# Patient Record
Sex: FEMALE | Race: WHITE | NOT HISPANIC OR LATINO | Employment: PART TIME | URBAN - NONMETROPOLITAN AREA
[De-identification: names, ages, dates, MRNs, and addresses within clinical notes are randomized per-mention and may not be internally consistent; named-entity substitution may affect disease eponyms.]

---

## 2017-03-09 ENCOUNTER — OFFICE VISIT (OUTPATIENT)
Dept: INTERNAL MEDICINE | Facility: CLINIC | Age: 62
End: 2017-03-09

## 2017-03-09 VITALS
DIASTOLIC BLOOD PRESSURE: 66 MMHG | TEMPERATURE: 97.8 F | HEART RATE: 62 BPM | HEIGHT: 61 IN | OXYGEN SATURATION: 98 % | WEIGHT: 142.5 LBS | BODY MASS INDEX: 26.91 KG/M2 | SYSTOLIC BLOOD PRESSURE: 140 MMHG

## 2017-03-09 DIAGNOSIS — E03.8 OTHER SPECIFIED HYPOTHYROIDISM: ICD-10-CM

## 2017-03-09 DIAGNOSIS — K21.9 GASTROESOPHAGEAL REFLUX DISEASE WITHOUT ESOPHAGITIS: Primary | ICD-10-CM

## 2017-03-09 DIAGNOSIS — R03.0 ELEVATED BLOOD-PRESSURE READING WITHOUT DIAGNOSIS OF HYPERTENSION: ICD-10-CM

## 2017-03-09 LAB
ALBUMIN SERPL-MCNC: 4.4 G/DL (ref 3.2–4.8)
ALBUMIN/GLOB SERPL: 1.3 G/DL (ref 1.5–2.5)
ALP SERPL-CCNC: 100 U/L (ref 25–100)
ALT SERPL W P-5'-P-CCNC: 17 U/L (ref 7–40)
ANION GAP SERPL CALCULATED.3IONS-SCNC: 4 MMOL/L (ref 3–11)
AST SERPL-CCNC: 22 U/L (ref 0–33)
BASOPHILS # BLD AUTO: 0.03 10*3/MM3 (ref 0–0.2)
BASOPHILS NFR BLD AUTO: 0.4 % (ref 0–1)
BILIRUB SERPL-MCNC: 0.2 MG/DL (ref 0.3–1.2)
BUN BLD-MCNC: 17 MG/DL (ref 9–23)
BUN/CREAT SERPL: 24.3 (ref 7–25)
CALCIUM SPEC-SCNC: 10.3 MG/DL (ref 8.7–10.4)
CHLORIDE SERPL-SCNC: 104 MMOL/L (ref 99–109)
CO2 SERPL-SCNC: 33 MMOL/L (ref 20–31)
CREAT BLD-MCNC: 0.7 MG/DL (ref 0.6–1.3)
DEPRECATED RDW RBC AUTO: 44.8 FL (ref 37–54)
EOSINOPHIL # BLD AUTO: 0.33 10*3/MM3 (ref 0.1–0.3)
EOSINOPHIL NFR BLD AUTO: 4 % (ref 0–3)
ERYTHROCYTE [DISTWIDTH] IN BLOOD BY AUTOMATED COUNT: 12.9 % (ref 11.3–14.5)
GFR SERPL CREATININE-BSD FRML MDRD: 85 ML/MIN/1.73
GLOBULIN UR ELPH-MCNC: 3.3 GM/DL
GLUCOSE BLD-MCNC: 88 MG/DL (ref 70–100)
HCT VFR BLD AUTO: 39.5 % (ref 34.5–44)
HGB BLD-MCNC: 12.8 G/DL (ref 11.5–15.5)
IMM GRANULOCYTES # BLD: 0.02 10*3/MM3 (ref 0–0.03)
IMM GRANULOCYTES NFR BLD: 0.2 % (ref 0–0.6)
LYMPHOCYTES # BLD AUTO: 2.58 10*3/MM3 (ref 0.6–4.8)
LYMPHOCYTES NFR BLD AUTO: 31.3 % (ref 24–44)
MCH RBC QN AUTO: 30.6 PG (ref 27–31)
MCHC RBC AUTO-ENTMCNC: 32.4 G/DL (ref 32–36)
MCV RBC AUTO: 94.5 FL (ref 80–99)
MONOCYTES # BLD AUTO: 0.64 10*3/MM3 (ref 0–1)
MONOCYTES NFR BLD AUTO: 7.8 % (ref 0–12)
NEUTROPHILS # BLD AUTO: 4.65 10*3/MM3 (ref 1.5–8.3)
NEUTROPHILS NFR BLD AUTO: 56.3 % (ref 41–71)
PLATELET # BLD AUTO: 305 10*3/MM3 (ref 150–450)
PMV BLD AUTO: 10.6 FL (ref 6–12)
POTASSIUM BLD-SCNC: 4.8 MMOL/L (ref 3.5–5.5)
PROT SERPL-MCNC: 7.7 G/DL (ref 5.7–8.2)
RBC # BLD AUTO: 4.18 10*6/MM3 (ref 3.89–5.14)
SODIUM BLD-SCNC: 141 MMOL/L (ref 132–146)
T4 FREE SERPL-MCNC: 1.17 NG/DL (ref 0.89–1.76)
TSH SERPL DL<=0.05 MIU/L-ACNC: 4.08 MIU/ML (ref 0.35–5.35)
WBC NRBC COR # BLD: 8.25 10*3/MM3 (ref 3.5–10.8)

## 2017-03-09 PROCEDURE — 99213 OFFICE O/P EST LOW 20 MIN: CPT | Performed by: INTERNAL MEDICINE

## 2017-03-09 PROCEDURE — 36415 COLL VENOUS BLD VENIPUNCTURE: CPT | Performed by: INTERNAL MEDICINE

## 2017-03-09 PROCEDURE — 85025 COMPLETE CBC W/AUTO DIFF WBC: CPT | Performed by: INTERNAL MEDICINE

## 2017-03-09 PROCEDURE — 80053 COMPREHEN METABOLIC PANEL: CPT | Performed by: INTERNAL MEDICINE

## 2017-03-09 PROCEDURE — 84443 ASSAY THYROID STIM HORMONE: CPT | Performed by: INTERNAL MEDICINE

## 2017-03-09 PROCEDURE — 84439 ASSAY OF FREE THYROXINE: CPT | Performed by: INTERNAL MEDICINE

## 2017-03-09 RX ORDER — OMEPRAZOLE 40 MG/1
40 CAPSULE, DELAYED RELEASE ORAL DAILY
Qty: 30 CAPSULE | Refills: 5 | Status: SHIPPED | OUTPATIENT
Start: 2017-03-09 | End: 2017-10-21 | Stop reason: SDUPTHER

## 2017-03-09 NOTE — PROGRESS NOTES
"Chief Complaint   Patient presents with   • Abstract     Pt states she's been having alot of stomach issues. States she feels like she has a hard time swallowing. Pt would also like to get labs done.      Subjective   Carola Resendiz is a 61 y.o. female.     HPI Comments: PMH of hypothyroid, COPD, GERD, H.pylori.  Here with complaints of difficulty swallowing and severe GERD the past few weeks.   She was seen by Dr Gayle last yr and had an EGD and she states she was told she had GERD.  She is taking ranitidine for GERD but feels it does not help.  She does not eat spicy food and tries not to eat after 6 PM.  Occasionally has difficulty swallowing.  No choking episodes.   Her H pylori was negative on scope last yr.   We never received records from her EGD done last year.  It is also time for her annual labs.       The following portions of the patient's history were reviewed and updated as appropriate: allergies, current medications, past family history, past medical history, past social history, past surgical history and problem list.    Review of Systems   HENT: Positive for trouble swallowing.    Gastrointestinal: Positive for abdominal pain and nausea. Negative for blood in stool, constipation, diarrhea and vomiting.        Severe GERD symptoms   Endocrine: Negative for cold intolerance and heat intolerance.   All other systems reviewed and are negative.      Objective   Visit Vitals   • /66   • Pulse 62   • Temp 97.8 °F (36.6 °C)   • Ht 61\" (154.9 cm)   • Wt 142 lb 8 oz (64.6 kg)   • SpO2 98%   • BMI 26.93 kg/m2     Body mass index is 26.93 kg/(m^2).  Physical Exam   Constitutional: She is oriented to person, place, and time. She appears well-developed and well-nourished.   HENT:   Head: Normocephalic and atraumatic.   Mouth/Throat: Oropharynx is clear and moist.   Eyes: Conjunctivae and EOM are normal. Pupils are equal, round, and reactive to light.   Neck: Normal range of motion. Neck supple. No " thyromegaly present.   Cardiovascular: Normal rate, regular rhythm, normal heart sounds and intact distal pulses.    No murmur heard.  Pulmonary/Chest: Effort normal and breath sounds normal. No respiratory distress.   Abdominal: Soft. Bowel sounds are normal.   Mild midepigastric pain to deep palpation   Musculoskeletal: She exhibits no edema.   Lymphadenopathy:     She has no cervical adenopathy.   Neurological: She is alert and oriented to person, place, and time. No cranial nerve deficit.   Psychiatric: She has a normal mood and affect. Judgment normal.   Nursing note and vitals reviewed.      Assessment/Plan   Carola Resendiz is here today and the following problems have been addressed:      Carola was seen today for abstract.    Diagnoses and all orders for this visit:    Gastroesophageal reflux disease without esophagitis  -     CBC & Differential; Future  -     Comprehensive Metabolic Panel; Future    Other specified hypothyroidism  -     TSH; Future  -     T4, Free; Future    Elevated blood-pressure reading without diagnosis of hypertension  -     CBC & Differential; Future  -     Comprehensive Metabolic Panel; Future    Other orders  -     omeprazole (priLOSEC) 40 MG capsule; Take 1 capsule by mouth Daily.    Labs as noted  Stop zantac and start omeprazole- take on empty stomach 30 minutes before food  No other med changes  Will get records from GI  No spicy foods, limit caffeine intake  No NSAIDS    RTC 4 weeks, we will discuss mammogram at that time  Please note that portions of this note were completed with a voice recognition program.  Efforts were made to edit dictation, but occasionally words are mistranscribed.

## 2017-03-27 RX ORDER — LEVOTHYROXINE SODIUM 0.07 MG/1
TABLET ORAL
Qty: 30 TABLET | Refills: 11 | Status: SHIPPED | OUTPATIENT
Start: 2017-03-27 | End: 2018-04-05 | Stop reason: SDUPTHER

## 2017-04-06 ENCOUNTER — OFFICE VISIT (OUTPATIENT)
Dept: INTERNAL MEDICINE | Facility: CLINIC | Age: 62
End: 2017-04-06

## 2017-04-06 VITALS
TEMPERATURE: 98.3 F | OXYGEN SATURATION: 94 % | HEART RATE: 64 BPM | WEIGHT: 144.13 LBS | BODY MASS INDEX: 27.21 KG/M2 | SYSTOLIC BLOOD PRESSURE: 136 MMHG | HEIGHT: 61 IN | DIASTOLIC BLOOD PRESSURE: 68 MMHG

## 2017-04-06 DIAGNOSIS — K21.9 GASTROESOPHAGEAL REFLUX DISEASE WITHOUT ESOPHAGITIS: ICD-10-CM

## 2017-04-06 DIAGNOSIS — Z12.31 SCREENING MAMMOGRAM, ENCOUNTER FOR: ICD-10-CM

## 2017-04-06 DIAGNOSIS — Z11.59 NEED FOR HEPATITIS C SCREENING TEST: Primary | ICD-10-CM

## 2017-04-06 PROCEDURE — 99213 OFFICE O/P EST LOW 20 MIN: CPT | Performed by: INTERNAL MEDICINE

## 2017-04-06 NOTE — PROGRESS NOTES
"Chief Complaint   Patient presents with   • Follow-up     4 weeks for GERD, DM, Hypothyroidism, and to discuss mammogram. No new complaints.      Subjective   Carola Resendiz is a 61 y.o. female.     HPI Comments: Here for followup of GERD sxs.   EGD in Sept last yr revealed gastritis and duodenitis and pt was placed on dexilant.   Last visit here we stopped zantac and placed her on omeprazole due to worsening GERD sxs.   She is feeling better with the omeprazole.  She does not eat spicy food or late at night.  She does tend to eat a significant amount of processed food though.  It is time to schedule mammogram.         The following portions of the patient's history were reviewed and updated as appropriate: allergies, current medications, past family history, past medical history, past social history, past surgical history and problem list.    Review of Systems   Gastrointestinal: Negative for abdominal pain, blood in stool and nausea.        Occasional GERD symptoms       Objective   /68  Pulse 64  Temp 98.3 °F (36.8 °C)  Ht 61\" (154.9 cm)  Wt 144 lb 2 oz (65.4 kg)  SpO2 94%  BMI 27.23 kg/m2  Body mass index is 27.23 kg/(m^2).  Physical Exam   Constitutional: She is oriented to person, place, and time. She appears well-developed and well-nourished.   HENT:   Head: Normocephalic and atraumatic.   Abdominal: Soft. Bowel sounds are normal.   Neurological: She is alert and oriented to person, place, and time.   Psychiatric: She has a normal mood and affect. Judgment normal.   Nursing note and vitals reviewed.      Assessment/Plan   Carola Resendiz is here today and the following problems have been addressed:      Carola was seen today for follow-up.    Diagnoses and all orders for this visit:    Need for hepatitis C screening test  -     Hepatitis C antibody; Future    Screening mammogram, encounter for  -     Mammo Screening Digital Tomosynthesis Bilateral With CAD; Future    Gastroesophageal reflux " disease without esophagitis    Avoid eating spicy foods  Avoid caffeinated beverages  Avoid carbonated beverages  Do not eat or drink within 2-3 hours of going to bed in the evening  Elevate head of bed on 4-6 inch blocks  Eat smaller portions of food throughout the day  Continue omeprazole on empty stomach 1/2 hour before food in morning  Mammogram ordered  Lab as noted    RTC 6 mo    Please note that portions of this note were completed with a voice recognition program.  Efforts were made to edit dictation, but occasionally words are mistranscribed.

## 2017-04-07 LAB — HCV AB S/CO SERPL IA: 0.1 S/CO RATIO (ref 0–0.9)

## 2017-04-26 ENCOUNTER — HOSPITAL ENCOUNTER (OUTPATIENT)
Dept: MAMMOGRAPHY | Facility: HOSPITAL | Age: 62
Discharge: HOME | End: 2017-04-26
Admitting: INTERNAL MEDICINE

## 2017-04-26 DIAGNOSIS — Z12.31 SCREENING MAMMOGRAM, ENCOUNTER FOR: ICD-10-CM

## 2017-04-26 PROCEDURE — G0202 SCR MAMMO BI INCL CAD: HCPCS

## 2017-04-26 PROCEDURE — 77063 BREAST TOMOSYNTHESIS BI: CPT

## 2017-05-09 ENCOUNTER — OFFICE VISIT (OUTPATIENT)
Dept: INTERNAL MEDICINE | Facility: CLINIC | Age: 62
End: 2017-05-09

## 2017-05-09 VITALS
DIASTOLIC BLOOD PRESSURE: 70 MMHG | HEIGHT: 61 IN | BODY MASS INDEX: 27.26 KG/M2 | OXYGEN SATURATION: 98 % | HEART RATE: 68 BPM | WEIGHT: 144.38 LBS | TEMPERATURE: 98.1 F | SYSTOLIC BLOOD PRESSURE: 132 MMHG

## 2017-05-09 DIAGNOSIS — Z91.09 ENVIRONMENTAL ALLERGIES: ICD-10-CM

## 2017-05-09 DIAGNOSIS — R68.89 FLU-LIKE SYMPTOMS: Primary | ICD-10-CM

## 2017-05-09 LAB
EXPIRATION DATE: NORMAL
FLUAV AG NPH QL: NORMAL
FLUBV AG NPH QL: NORMAL
INTERNAL CONTROL: NORMAL
Lab: NORMAL

## 2017-05-09 PROCEDURE — 87804 INFLUENZA ASSAY W/OPTIC: CPT | Performed by: INTERNAL MEDICINE

## 2017-05-09 PROCEDURE — 99213 OFFICE O/P EST LOW 20 MIN: CPT | Performed by: INTERNAL MEDICINE

## 2017-05-09 RX ORDER — METHYLPREDNISOLONE 4 MG/1
TABLET ORAL
Qty: 21 TABLET | Refills: 0 | Status: SHIPPED | OUTPATIENT
Start: 2017-05-09 | End: 2017-06-01

## 2017-06-01 ENCOUNTER — OFFICE VISIT (OUTPATIENT)
Dept: INTERNAL MEDICINE | Facility: CLINIC | Age: 62
End: 2017-06-01

## 2017-06-01 VITALS
HEIGHT: 61 IN | TEMPERATURE: 98.2 F | WEIGHT: 146 LBS | OXYGEN SATURATION: 98 % | SYSTOLIC BLOOD PRESSURE: 128 MMHG | DIASTOLIC BLOOD PRESSURE: 80 MMHG | BODY MASS INDEX: 27.56 KG/M2 | HEART RATE: 82 BPM

## 2017-06-01 DIAGNOSIS — B37.2 YEAST DERMATITIS: Primary | ICD-10-CM

## 2017-06-01 PROCEDURE — 99213 OFFICE O/P EST LOW 20 MIN: CPT | Performed by: PHYSICIAN ASSISTANT

## 2017-06-01 RX ORDER — NYSTATIN AND TRIAMCINOLONE ACETONIDE 100000; 1 [USP'U]/G; MG/G
OINTMENT TOPICAL 2 TIMES DAILY
Qty: 30 G | Refills: 0 | Status: SHIPPED | OUTPATIENT
Start: 2017-06-01 | End: 2017-06-08

## 2017-06-01 NOTE — PROGRESS NOTES
Carola Resendiz is a 61 y.o. female.     Subjective   History of Present Illness   Here today with concern of sudden onset of rash ib bilateral axilla 4 days ago which has been spreading since then. Reports that the rash burns and itches. Has been using Hydrocortisone cream which helps the itching but rash continues to spread. Has also been coughing and had rhinorrhea with sore throat for the last 2 weeks which was treated with a Medrol dose pack by Dr. Vermeesch.  Symptoms improved but are still bothersome.       The following portions of the patient's history were reviewed and updated as appropriate: allergies, current medications, past family history, past medical history, past social history, past surgical history and problem list.    Review of Systems    Constitutional: Negative for appetite change, chills, fatigue, fever and unexpected weight change.   HENT:  rhinorrhea, sore throat, Negative for congestion, ear pain, hearing loss, nosebleeds, postnasal drip, tinnitus and trouble swallowing.    Eyes: Negative for photophobia, discharge and visual disturbance.   Respiratory: cough. Negative for chest tightness, shortness of breath and wheezing.    Cardiovascular: Negative for chest pain, palpitations and leg swelling.   Gastrointestinal: Negative for abdominal distention, abdominal pain, blood in stool, constipation, diarrhea, nausea and vomiting.   Endocrine: Negative for cold intolerance, heat intolerance, polydipsia, polyphagia and polyuria.   Musculoskeletal: Negative for arthralgias, back pain, joint swelling, myalgias, neck pain and neck stiffness.   Skin: rash, itching, burning. Negative for color change, pallor and wound.   Allergic/Immunologic: Negative for environmental allergies, food allergies and immunocompromised state.   Neurological: Negative for dizziness, tremors, seizures, weakness, numbness and headaches.   Hematological: Negative for adenopathy. Does not bruise/bleed easily.  "  Psychiatric/Behavioral: Negative for sleep disturbances, agitation, behavioral problems, confusion, hallucinations, self-injury and suicidal ideas. The patient is not nervous/anxious.      Objective    Physical Exam  Constitutional: Oriented to person, place, and time. Appears well-developed and well-nourished.   HENT: mild OP erythema  Head: Normocephalic and atraumatic.   Eyes: EOM are normal. Pupils are equal, round, and reactive to light.   Neck: Normal range of motion. Neck supple.   Cardiovascular: Normal rate, regular rhythm and normal heart sounds.    Pulmonary/Chest: Effort normal and breath sounds normal. No respiratory distress.  Has no wheezes or rales. Exhibits no chest wall tenderness.   Abdominal: Soft. Bowel sounds are normal. Exhibits no distension and no mass. There is no tenderness.   Musculoskeletal: Normal range of motion. Exhibits no tenderness.   Neurological: Alert and oriented to person, place, and time.   Skin: erythematous maculopapular rash without vesicular appearance. Few satellite lesions.  Skin is warm and dry.   Psychiatric: Has a normal mood and affect. Behavior is normal. Judgment and thought content normal.       /80  Pulse 82  Temp 98.2 °F (36.8 °C)  Ht 61\" (154.9 cm)  Wt 146 lb (66.2 kg)  SpO2 98%  BMI 27.59 kg/m2    Nursing note and vitals reviewed.        Assessment/Plan   Carola was seen today for rash.    Diagnoses and all orders for this visit:    Yeast dermatitis  -     nystatin-triamcinolone (MYCOLOG) 194047-7.1 UNIT/GM-% ointment; Apply  topically 2 (Two) Times a Day for 7 days.    Call if rash not improving within 3 days.            "

## 2017-10-06 ENCOUNTER — OFFICE VISIT (OUTPATIENT)
Dept: INTERNAL MEDICINE | Facility: CLINIC | Age: 62
End: 2017-10-06

## 2017-10-06 VITALS
SYSTOLIC BLOOD PRESSURE: 130 MMHG | WEIGHT: 144.25 LBS | HEIGHT: 61 IN | TEMPERATURE: 98.4 F | OXYGEN SATURATION: 97 % | BODY MASS INDEX: 27.23 KG/M2 | HEART RATE: 69 BPM | DIASTOLIC BLOOD PRESSURE: 72 MMHG

## 2017-10-06 DIAGNOSIS — J43.9 PULMONARY EMPHYSEMA, UNSPECIFIED EMPHYSEMA TYPE (HCC): ICD-10-CM

## 2017-10-06 DIAGNOSIS — K21.9 GASTROESOPHAGEAL REFLUX DISEASE WITHOUT ESOPHAGITIS: ICD-10-CM

## 2017-10-06 DIAGNOSIS — E03.8 OTHER SPECIFIED HYPOTHYROIDISM: Primary | ICD-10-CM

## 2017-10-06 PROCEDURE — 99213 OFFICE O/P EST LOW 20 MIN: CPT | Performed by: INTERNAL MEDICINE

## 2017-10-06 RX ORDER — AMOXICILLIN AND CLAVULANATE POTASSIUM 875; 125 MG/1; MG/1
TABLET, FILM COATED ORAL
Refills: 0 | COMMUNITY
Start: 2017-09-26 | End: 2018-03-22

## 2017-10-06 NOTE — PROGRESS NOTES
"Chief Complaint   Patient presents with   • Follow-up     3 months for DM, GERD, and Hypothyroidism. No new complaints.      Subjective   Carola Resendiz is a 61 y.o. female.     HPI Comments: Here for follow up of hypothyroid, COPD, GERD.  Her GERD is usually well controlled.  She tries to avoid spicy foods.  Has a fullness at top of stomach.   She takes her thyroid med on empty stomach.  She is cold all the time.  She had a sinus infection last week, went to [facility] and was given augmentin, she is feeling better now.   She saw Dr Bates and he changed her tudorza to spiriva and it does not work as well.  She is not using spiriva.   Her mammogram is UTD.   She declines flu shot.         The following portions of the patient's history were reviewed and updated as appropriate: allergies, current medications, past family history, past medical history, past social history, past surgical history and problem list.    Review of Systems   Respiratory: Positive for shortness of breath. Negative for wheezing.    Cardiovascular: Negative for chest pain, palpitations and leg swelling.   Gastrointestinal: Positive for abdominal distention. Negative for abdominal pain, constipation, diarrhea, nausea and vomiting.   Endocrine: Negative for cold intolerance and heat intolerance.   All other systems reviewed and are negative.      Objective   /72  Pulse 69  Temp 98.4 °F (36.9 °C)  Ht 61\" (154.9 cm)  Wt 144 lb 4 oz (65.4 kg)  SpO2 97%  BMI 27.26 kg/m2  Body mass index is 27.26 kg/(m^2).  Physical Exam   Constitutional: She is oriented to person, place, and time. She appears well-developed and well-nourished.   HENT:   Head: Normocephalic and atraumatic.   Mouth/Throat: Oropharynx is clear and moist.   Eyes: Conjunctivae and EOM are normal. Pupils are equal, round, and reactive to light.   Neck: Normal range of motion. Neck supple. No thyromegaly present.   Cardiovascular: Normal rate, regular rhythm, normal heart " sounds and intact distal pulses.    No murmur heard.  Pulmonary/Chest: Effort normal. No respiratory distress.   Diminished breath sounds throughout all lung fields   Abdominal: Soft. Bowel sounds are normal.   Musculoskeletal: She exhibits no edema.   Lymphadenopathy:     She has no cervical adenopathy.   Neurological: She is alert and oriented to person, place, and time. No cranial nerve deficit.   Psychiatric: She has a normal mood and affect. Judgment normal.   Nursing note and vitals reviewed.      Assessment/Plan   Carola Resendiz is here today and the following problems have been addressed:      Carola was seen today for follow-up.    Diagnoses and all orders for this visit:    Other specified hypothyroidism    Gastroesophageal reflux disease without esophagitis    Pulmonary emphysema, unspecified emphysema type    continue all current meds  She declines flu vaccine  All other HCM is UTD  She will discuss change in inhalers with Dr Paulo SIMPSON 5 mo, labs then    Please note that portions of this note were completed with a voice recognition program.  Efforts were made to edit dictation, but occasionally words are mistranscribed.

## 2017-10-23 RX ORDER — OMEPRAZOLE 40 MG/1
CAPSULE, DELAYED RELEASE ORAL
Qty: 30 CAPSULE | Refills: 5 | Status: SHIPPED | OUTPATIENT
Start: 2017-10-23 | End: 2018-04-30 | Stop reason: SDUPTHER

## 2017-12-23 ENCOUNTER — HOSPITAL ENCOUNTER (EMERGENCY)
Facility: HOSPITAL | Age: 62
Discharge: HOME | End: 2017-12-23
Attending: EMERGENCY MEDICINE | Admitting: EMERGENCY MEDICINE

## 2017-12-23 ENCOUNTER — APPOINTMENT (OUTPATIENT)
Dept: GENERAL RADIOLOGY | Facility: HOSPITAL | Age: 62
End: 2017-12-23

## 2017-12-23 VITALS
BODY MASS INDEX: 26.43 KG/M2 | TEMPERATURE: 98.1 F | HEART RATE: 78 BPM | WEIGHT: 140 LBS | DIASTOLIC BLOOD PRESSURE: 78 MMHG | RESPIRATION RATE: 18 BRPM | OXYGEN SATURATION: 94 % | SYSTOLIC BLOOD PRESSURE: 143 MMHG | HEIGHT: 61 IN

## 2017-12-23 DIAGNOSIS — J10.1 INFLUENZA A: Primary | ICD-10-CM

## 2017-12-23 LAB
FLUAV AG NPH QL: POSITIVE
FLUBV AG NPH QL IA: NEGATIVE

## 2017-12-23 PROCEDURE — 99284 EMERGENCY DEPT VISIT MOD MDM: CPT

## 2017-12-23 PROCEDURE — 71020 HC CHEST PA AND LATERAL: CPT

## 2017-12-23 PROCEDURE — 94640 AIRWAY INHALATION TREATMENT: CPT

## 2017-12-23 PROCEDURE — 94799 UNLISTED PULMONARY SVC/PX: CPT

## 2017-12-23 PROCEDURE — 87804 INFLUENZA ASSAY W/OPTIC: CPT | Performed by: EMERGENCY MEDICINE

## 2017-12-23 PROCEDURE — 63710000001 PREDNISONE PER 5 MG: Performed by: PHYSICIAN ASSISTANT

## 2017-12-23 RX ORDER — PREDNISONE 20 MG/1
20 TABLET ORAL 3 TIMES DAILY
Qty: 15 TABLET | Refills: 0 | Status: SHIPPED | OUTPATIENT
Start: 2017-12-23 | End: 2018-03-22

## 2017-12-23 RX ORDER — IPRATROPIUM BROMIDE AND ALBUTEROL SULFATE 2.5; .5 MG/3ML; MG/3ML
3 SOLUTION RESPIRATORY (INHALATION) ONCE
Status: COMPLETED | OUTPATIENT
Start: 2017-12-23 | End: 2017-12-23

## 2017-12-23 RX ADMIN — PREDNISONE 60 MG: 50 TABLET ORAL at 18:48

## 2017-12-23 RX ADMIN — IPRATROPIUM BROMIDE AND ALBUTEROL SULFATE 3 ML: .5; 3 SOLUTION RESPIRATORY (INHALATION) at 17:31

## 2017-12-26 ENCOUNTER — TELEPHONE (OUTPATIENT)
Dept: INTERNAL MEDICINE | Facility: CLINIC | Age: 62
End: 2017-12-26

## 2017-12-26 NOTE — TELEPHONE ENCOUNTER
I recommend plain Mucinex expectorant.  I also recommend she use her albuterol inhaler every 3-4 hours.

## 2017-12-26 NOTE — TELEPHONE ENCOUNTER
PATIENT WENT TO THE [facility] AND THEY TESTED HER FOR THE FLU WHICH WAS NEGATIVE. THEY GAVE HER SOME AMOXACILLIN BUT AFTER TAKING IT DIDN'T FEEL ANY BETTER. SHE WENT TO THE ER Saturday AND TESTED POSITIVE FOR THE FLU. THEY GAVE HER STEROIDS. SHE SAYS SHE FEELS A LITTLE BETTER BUT SHE CAN'T GET ANYTHING ELSE OUT AND IT'S HANGING IN HER CHEST. SHE SAID SHE IS TAKING THE COUGH SYRUP, STEROIDS, AND INHALER. SHE IS WANTING TO KNOW WHAT SHE SHOULD DO AND REQUESTS CALL BACK.

## 2018-03-22 ENCOUNTER — OFFICE VISIT (OUTPATIENT)
Dept: INTERNAL MEDICINE | Facility: CLINIC | Age: 63
End: 2018-03-22

## 2018-03-22 ENCOUNTER — TELEPHONE (OUTPATIENT)
Dept: INTERNAL MEDICINE | Facility: CLINIC | Age: 63
End: 2018-03-22

## 2018-03-22 VITALS
OXYGEN SATURATION: 98 % | TEMPERATURE: 98 F | SYSTOLIC BLOOD PRESSURE: 140 MMHG | WEIGHT: 146.5 LBS | BODY MASS INDEX: 27.66 KG/M2 | DIASTOLIC BLOOD PRESSURE: 85 MMHG | HEIGHT: 61 IN | HEART RATE: 83 BPM

## 2018-03-22 DIAGNOSIS — J43.9 PULMONARY EMPHYSEMA, UNSPECIFIED EMPHYSEMA TYPE (HCC): Primary | ICD-10-CM

## 2018-03-22 DIAGNOSIS — K21.9 GASTROESOPHAGEAL REFLUX DISEASE WITHOUT ESOPHAGITIS: ICD-10-CM

## 2018-03-22 DIAGNOSIS — E03.8 OTHER SPECIFIED HYPOTHYROIDISM: ICD-10-CM

## 2018-03-22 DIAGNOSIS — R03.0 ELEVATED BLOOD-PRESSURE READING WITHOUT DIAGNOSIS OF HYPERTENSION: ICD-10-CM

## 2018-03-22 DIAGNOSIS — R10.32 LLQ PAIN: ICD-10-CM

## 2018-03-22 LAB
BILIRUB BLD-MCNC: NEGATIVE MG/DL
CLARITY, POC: CLEAR
COLOR UR: YELLOW
GLUCOSE UR STRIP-MCNC: NEGATIVE MG/DL
KETONES UR QL: NEGATIVE
LEUKOCYTE EST, POC: ABNORMAL
NITRITE UR-MCNC: NEGATIVE MG/ML
PH UR: 5 [PH] (ref 5–8)
POC CREATININE URINE: 200
POC MICROALBUMIN URINE: 30
PROT UR STRIP-MCNC: NEGATIVE MG/DL
RBC # UR STRIP: NEGATIVE /UL
SP GR UR: 1.02 (ref 1–1.03)
UROBILINOGEN UR QL: ABNORMAL

## 2018-03-22 PROCEDURE — 82044 UR ALBUMIN SEMIQUANTITATIVE: CPT | Performed by: INTERNAL MEDICINE

## 2018-03-22 PROCEDURE — 81003 URINALYSIS AUTO W/O SCOPE: CPT | Performed by: INTERNAL MEDICINE

## 2018-03-22 PROCEDURE — 99214 OFFICE O/P EST MOD 30 MIN: CPT | Performed by: INTERNAL MEDICINE

## 2018-03-22 RX ORDER — METRONIDAZOLE 500 MG/1
500 TABLET ORAL 3 TIMES DAILY
Qty: 30 TABLET | Refills: 0 | Status: SHIPPED | OUTPATIENT
Start: 2018-03-22 | End: 2018-04-03

## 2018-03-22 RX ORDER — TIOTROPIUM BROMIDE INHALATION SPRAY 3.12 UG/1
SPRAY, METERED RESPIRATORY (INHALATION)
Refills: 1 | COMMUNITY
Start: 2018-01-22 | End: 2020-06-04 | Stop reason: SDUPTHER

## 2018-03-22 RX ORDER — CIPROFLOXACIN 500 MG/1
500 TABLET, FILM COATED ORAL EVERY 12 HOURS SCHEDULED
Qty: 20 TABLET | Refills: 0 | Status: SHIPPED | OUTPATIENT
Start: 2018-03-22 | End: 2018-04-03

## 2018-03-22 NOTE — TELEPHONE ENCOUNTER
PATIENT NEEDS A 2 WF/U WITH Dr. VERMEESCH. THERE WAS NOT A SPOT TO PUT HER IN PLEASE LOOK AT THE SCHEDULE AND TELL HER WHEN TO COME BACK.     THANKS

## 2018-03-22 NOTE — PROGRESS NOTES
"Chief Complaint   Patient presents with   • Follow-up     3 month, COPD, GERD, DM, Hypothyroidism     Subjective   Carola Resendiz is a 62 y.o. female.     Here for follow up of hypothyroid, COPD, GERD.  Her BP is elevated again today.  She has not been checking it at home.    She has been having LLQ pain lately.  The pain radiates up under left ribs to mid stomach.  Stomach gets very bloated.  It becomes a pressure and goes to chest.  She eats TUMS and the pressure resolves.  She had colonoscopy in 2016 per DR Manuel and she had diverticulosis.  She has been having this pain about every other day.  She does take her omeprazole on empty stomach in AM.   She does not get much GERD, but it occurs about twice a month.    She takes her thyroid med in AM on empty stomach.   Her COPD is stable on spirvia and albuterol.  She sees DR Bates for this.  He will do low dose CT next visit.   Her mammogram is due next mo, she will schedule this.            The following portions of the patient's history were reviewed and updated as appropriate: allergies, current medications, past family history, past medical history, past social history, past surgical history and problem list.    Review of Systems   Constitutional: Negative for unexpected weight change.   Respiratory: Negative for shortness of breath and wheezing.    Cardiovascular: Negative for chest pain, palpitations and leg swelling.   Gastrointestinal: Positive for abdominal distention and abdominal pain. Negative for constipation and diarrhea.   Endocrine: Negative for cold intolerance and heat intolerance.   All other systems reviewed and are negative.      Objective   /85   Pulse 83   Temp 98 °F (36.7 °C)   Ht 154.9 cm (60.98\")   Wt 66.5 kg (146 lb 8 oz)   SpO2 98%   BMI 27.70 kg/m²   Body mass index is 27.7 kg/m².  Physical Exam   Constitutional: She is oriented to person, place, and time. She appears well-developed and well-nourished.   HENT:   Head: " Normocephalic and atraumatic.   Mouth/Throat: Oropharynx is clear and moist.   Eyes: Conjunctivae and EOM are normal. Pupils are equal, round, and reactive to light.   Neck: Normal range of motion. Neck supple. No thyromegaly present.   Cardiovascular: Normal rate, regular rhythm, normal heart sounds and intact distal pulses.    No murmur heard.  Pulmonary/Chest: Effort normal. No respiratory distress.   Slightly decreased breath sounds throughout   Abdominal: Soft. Bowel sounds are normal. She exhibits distension. There is tenderness.   Abdomen is distended and tender in midepigastric area, no palpable masses, no rebound or guarding   Musculoskeletal: She exhibits no edema.   Lymphadenopathy:     She has no cervical adenopathy.   Neurological: She is alert and oriented to person, place, and time. No cranial nerve deficit.   Psychiatric: She has a normal mood and affect. Judgment normal.   Nursing note and vitals reviewed.      Assessment/Plan   Carola Resendiz is here today and the following problems have been addressed:      Carola was seen today for follow-up.    Diagnoses and all orders for this visit:    Pulmonary emphysema, unspecified emphysema type    Gastroesophageal reflux disease without esophagitis  -     Comprehensive Metabolic Panel    Other specified hypothyroidism  -     T4, Free  -     TSH    Elevated blood-pressure reading without diagnosis of hypertension  -     Lipid Panel    LLQ pain  -     CBC & Differential  -     Comprehensive Metabolic Panel  -     Celiac Comprehensive Panel  -     POCT urinalysis dipstick, automated  -     POCT microalbumin    Other orders  -     ciprofloxacin (CIPRO) 500 MG tablet; Take 1 tablet by mouth Every 12 (Twelve) Hours.  -     metroNIDAZOLE (FLAGYL) 500 MG tablet; Take 1 tablet by mouth 3 (Three) Times a Day.    labs as noted  Check BP on occasion due to elevated readings  Given cipro and flagyl for possible diverticulitis based on history and exam  UA with 25 LE,  UC sent  Will check for celiac disease also  If abdominal sxs are not improved at follow up visit, will do CT of abd/pelvis  COPD is stable  She will schedule her mammogram    RTC 2 weeks for follow up of abd pain    Please note that portions of this note were completed with a voice recognition program.  Efforts were made to edit dictation, but occasionally words are mistranscribed.

## 2018-03-23 NOTE — TELEPHONE ENCOUNTER
We do not have any openings in 2 weeks for a f/u visit. I will keep an eye on schedule for openings.

## 2018-03-24 LAB
BACTERIA UR CULT: NORMAL
BACTERIA UR CULT: NORMAL

## 2018-03-26 ENCOUNTER — TELEPHONE (OUTPATIENT)
Dept: INTERNAL MEDICINE | Facility: CLINIC | Age: 63
End: 2018-03-26

## 2018-03-26 LAB
ALBUMIN SERPL-MCNC: 4.1 G/DL (ref 3.5–5)
ALBUMIN/GLOB SERPL: 1.2 G/DL (ref 1–2)
ALP SERPL-CCNC: 85 U/L (ref 38–126)
ALT SERPL-CCNC: 28 U/L (ref 13–69)
AST SERPL-CCNC: 21 U/L (ref 15–46)
BASOPHILS # BLD AUTO: 0.03 10*3/MM3 (ref 0–0.2)
BASOPHILS NFR BLD AUTO: 0.4 % (ref 0–2.5)
BILIRUB SERPL-MCNC: 0.5 MG/DL (ref 0.2–1.3)
BUN SERPL-MCNC: 19 MG/DL (ref 7–20)
BUN/CREAT SERPL: 31.7 (ref 7.1–23.5)
CALCIUM SERPL-MCNC: 9.9 MG/DL (ref 8.4–10.2)
CHLORIDE SERPL-SCNC: 103 MMOL/L (ref 98–107)
CHOLEST SERPL-MCNC: 198 MG/DL (ref 0–199)
CO2 SERPL-SCNC: 30 MMOL/L (ref 26–30)
CREAT SERPL-MCNC: 0.6 MG/DL (ref 0.6–1.3)
ENDOMYSIUM IGA SER QL: NEGATIVE
EOSINOPHIL # BLD AUTO: 0.28 10*3/MM3 (ref 0–0.7)
EOSINOPHIL NFR BLD AUTO: 4.2 % (ref 0–7)
ERYTHROCYTE [DISTWIDTH] IN BLOOD BY AUTOMATED COUNT: 12.3 % (ref 11.5–14.5)
GFR SERPLBLD CREATININE-BSD FMLA CKD-EPI: 101 ML/MIN/1.73
GFR SERPLBLD CREATININE-BSD FMLA CKD-EPI: 122 ML/MIN/1.73
GLIADIN PEPTIDE IGA SER-ACNC: 4 UNITS (ref 0–19)
GLIADIN PEPTIDE IGG SER-ACNC: 2 UNITS (ref 0–19)
GLOBULIN SER CALC-MCNC: 3.4 GM/DL
GLUCOSE SERPL-MCNC: 89 MG/DL (ref 74–98)
HCT VFR BLD AUTO: 38.9 % (ref 37–47)
HDLC SERPL-MCNC: 80 MG/DL (ref 40–60)
HGB BLD-MCNC: 12.7 G/DL (ref 12–16)
IGA SERPL-MCNC: 331 MG/DL (ref 87–352)
IMM GRANULOCYTES # BLD: 0 10*3/MM3 (ref 0–0.06)
IMM GRANULOCYTES NFR BLD: 0 % (ref 0–0.6)
LDLC SERPL CALC-MCNC: 102 MG/DL (ref 0–99)
LYMPHOCYTES # BLD AUTO: 1.78 10*3/MM3 (ref 0.6–3.4)
LYMPHOCYTES NFR BLD AUTO: 26.4 % (ref 10–50)
MCH RBC QN AUTO: 31.1 PG (ref 27–31)
MCHC RBC AUTO-ENTMCNC: 32.6 G/DL (ref 30–37)
MCV RBC AUTO: 95.3 FL (ref 81–99)
MONOCYTES # BLD AUTO: 0.47 10*3/MM3 (ref 0–0.9)
MONOCYTES NFR BLD AUTO: 7 % (ref 0–12)
NEUTROPHILS # BLD AUTO: 4.18 10*3/MM3 (ref 2–6.9)
NEUTROPHILS NFR BLD AUTO: 62 % (ref 37–80)
NRBC BLD AUTO-RTO: 0 /100 WBC (ref 0–0)
PLATELET # BLD AUTO: 278 10*3/MM3 (ref 130–400)
POTASSIUM SERPL-SCNC: 4.8 MMOL/L (ref 3.5–5.1)
PROT SERPL-MCNC: 7.5 G/DL (ref 6.3–8.2)
RBC # BLD AUTO: 4.08 10*6/MM3 (ref 4.2–5.4)
SODIUM SERPL-SCNC: 146 MMOL/L (ref 137–145)
T4 FREE SERPL-MCNC: 1.69 NG/DL (ref 0.78–2.19)
TRIGL SERPL-MCNC: 81 MG/DL
TSH SERPL DL<=0.005 MIU/L-ACNC: 1.99 MIU/ML (ref 0.47–4.68)
TTG IGA SER-ACNC: <2 U/ML (ref 0–3)
TTG IGG SER-ACNC: <2 U/ML (ref 0–5)
VLDLC SERPL CALC-MCNC: 16.2 MG/DL
WBC # BLD AUTO: 6.74 10*3/MM3 (ref 4.8–10.8)

## 2018-03-26 NOTE — PROGRESS NOTES
Please tell patient all labs are in normal or acceptable range at this time.  Celiac panel is negative.  Please tell her to continue antibiotics given and we will follow-up with her as scheduled.

## 2018-03-26 NOTE — TELEPHONE ENCOUNTER
----- Message from Marilyn K Vermeesch, MD sent at 3/26/2018  7:27 AM EDT -----  Please tell patient all labs are in normal or acceptable range at this time.  Celiac panel is negative.  Please tell her to continue antibiotics given and we will follow-up with her as scheduled.

## 2018-04-03 ENCOUNTER — OFFICE VISIT (OUTPATIENT)
Dept: INTERNAL MEDICINE | Facility: CLINIC | Age: 63
End: 2018-04-03

## 2018-04-03 VITALS
TEMPERATURE: 98 F | SYSTOLIC BLOOD PRESSURE: 144 MMHG | DIASTOLIC BLOOD PRESSURE: 68 MMHG | OXYGEN SATURATION: 96 % | HEART RATE: 69 BPM

## 2018-04-03 DIAGNOSIS — K21.9 GASTROESOPHAGEAL REFLUX DISEASE WITHOUT ESOPHAGITIS: Primary | ICD-10-CM

## 2018-04-03 DIAGNOSIS — R10.32 LLQ PAIN: ICD-10-CM

## 2018-04-03 PROCEDURE — 99213 OFFICE O/P EST LOW 20 MIN: CPT | Performed by: INTERNAL MEDICINE

## 2018-04-03 NOTE — PROGRESS NOTES
Chief Complaint   Patient presents with   • Follow-up     2 weeks for abdominal pain. Pt states she's about the same as last visit. Pt states she didn't complete the Cipro and Flagyl     Subjective   Carola Resendiz is a 62 y.o. female.     Here for follow up of LLQ pain with bloating.   She was given cipro and flagyl for presumed diverticulitis, as she has known diverticulosis.  She only took 3 doses of each ABX-said it made her feel too sick.    She is having pain in left lower quadrant on and off almost daily.  Her stools are normal most of the time.  No blood or mucous.   She is having pain later in the day after lunch.  She feels good in the morning.  She starts to get bloating then gas begins to push up on diaphram and she feels SOA.    She does not drink carbonated beverages.   EGD in 2016 did not reveal HH, but she did have esophagitis, duodenitis.  Colonoscopy in 2016 per Dr. Manuel revealed hemorrhoids, sessile polyps and diverticulosis.  There is family history of colon cancer.  Her weight is stable.             The following portions of the patient's history were reviewed and updated as appropriate: allergies, current medications, past family history, past medical history, past social history, past surgical history and problem list.    Review of Systems   Constitutional: Negative for activity change, appetite change and unexpected weight change.   Respiratory: Positive for shortness of breath.    Gastrointestinal: Positive for abdominal distention, abdominal pain and nausea.       Objective   /68   Pulse 69   Temp 98 °F (36.7 °C)   SpO2 96%   There is no height or weight on file to calculate BMI.  Physical Exam   Constitutional: She is oriented to person, place, and time. She appears well-developed and well-nourished.   HENT:   Head: Normocephalic and atraumatic.   Eyes: EOM are normal. Pupils are equal, round, and reactive to light.   Pulmonary/Chest: Effort normal.   Abdominal: Soft. Bowel  sounds are normal. She exhibits distension. There is tenderness.   Minimal tenderness in left lower quadrant   Neurological: She is alert and oriented to person, place, and time.   Psychiatric: She has a normal mood and affect. Her behavior is normal. Judgment and thought content normal.   Nursing note and vitals reviewed.      Assessment/Plan   Carola Resendiz is here today and the following problems have been addressed:      Carola was seen today for follow-up.    Diagnoses and all orders for this visit:    Gastroesophageal reflux disease without esophagitis  -     Ambulatory Referral to Gastroenterology    LLQ pain  -     CT Abdomen Pelvis With & Without Contrast    CT abdomen with and without contrast for further evaluation of left lower quadrant pain, recent creatinine was normal  Refer to Dr Manuel for evaluation of left lower quadrant pain and possible repeat colonoscopy  Continue omeprazole    Return to clinic in 3 months or when necessary        Please note that portions of this note were completed with a voice recognition program.  Efforts were made to edit dictation, but occasionally words are mistranscribed.

## 2018-04-05 RX ORDER — LEVOTHYROXINE SODIUM 0.07 MG/1
TABLET ORAL
Qty: 30 TABLET | Refills: 2 | Status: SHIPPED | OUTPATIENT
Start: 2018-04-05 | End: 2018-07-05 | Stop reason: SDUPTHER

## 2018-04-06 ENCOUNTER — HOSPITAL ENCOUNTER (OUTPATIENT)
Dept: CT IMAGING | Facility: HOSPITAL | Age: 63
Discharge: HOME | End: 2018-04-06
Admitting: INTERNAL MEDICINE

## 2018-04-06 PROCEDURE — 25010000002 IOPAMIDOL 61 % SOLUTION: Performed by: INTERNAL MEDICINE

## 2018-04-06 PROCEDURE — 74178 CT ABD&PLV WO CNTR FLWD CNTR: CPT

## 2018-04-06 RX ADMIN — IOPAMIDOL 100 ML: 612 INJECTION, SOLUTION INTRAVENOUS at 10:15

## 2018-04-06 NOTE — PROGRESS NOTES
Please tell patient CT scan of abdomen is normal except for diverticulosis with no evidence of infection at this time.

## 2018-04-09 ENCOUNTER — TELEPHONE (OUTPATIENT)
Dept: INTERNAL MEDICINE | Facility: CLINIC | Age: 63
End: 2018-04-09

## 2018-04-09 NOTE — TELEPHONE ENCOUNTER
----- Message from Marilyn K Vermeesch, MD sent at 4/6/2018 10:36 AM EDT -----  Please tell patient CT scan of abdomen is normal except for diverticulosis with no evidence of infection at this time.

## 2018-04-30 ENCOUNTER — OFFICE VISIT (OUTPATIENT)
Dept: INTERNAL MEDICINE | Facility: CLINIC | Age: 63
End: 2018-04-30

## 2018-04-30 VITALS
DIASTOLIC BLOOD PRESSURE: 80 MMHG | SYSTOLIC BLOOD PRESSURE: 142 MMHG | TEMPERATURE: 97.8 F | HEART RATE: 76 BPM | OXYGEN SATURATION: 97 %

## 2018-04-30 DIAGNOSIS — R68.89 FLU-LIKE SYMPTOMS: ICD-10-CM

## 2018-04-30 DIAGNOSIS — H61.23 BILATERAL IMPACTED CERUMEN: ICD-10-CM

## 2018-04-30 DIAGNOSIS — R03.0 ELEVATED BLOOD-PRESSURE READING WITHOUT DIAGNOSIS OF HYPERTENSION: ICD-10-CM

## 2018-04-30 DIAGNOSIS — J01.40 SUBACUTE PANSINUSITIS: Primary | ICD-10-CM

## 2018-04-30 PROBLEM — R10.32 LLQ PAIN: Status: RESOLVED | Noted: 2018-03-22 | Resolved: 2018-04-30

## 2018-04-30 PROCEDURE — 87804 INFLUENZA ASSAY W/OPTIC: CPT | Performed by: INTERNAL MEDICINE

## 2018-04-30 PROCEDURE — 99213 OFFICE O/P EST LOW 20 MIN: CPT | Performed by: INTERNAL MEDICINE

## 2018-04-30 PROCEDURE — 69209 REMOVE IMPACTED EAR WAX UNI: CPT | Performed by: INTERNAL MEDICINE

## 2018-04-30 RX ORDER — CEFUROXIME AXETIL 500 MG/1
500 TABLET ORAL 2 TIMES DAILY
Qty: 20 TABLET | Refills: 0 | Status: SHIPPED | OUTPATIENT
Start: 2018-04-30 | End: 2018-06-27

## 2018-04-30 RX ORDER — PREDNISONE 20 MG/1
20 TABLET ORAL DAILY
Qty: 5 TABLET | Refills: 0 | Status: SHIPPED | OUTPATIENT
Start: 2018-04-30 | End: 2018-06-27

## 2018-04-30 RX ORDER — OMEPRAZOLE 40 MG/1
40 CAPSULE, DELAYED RELEASE ORAL DAILY
Qty: 30 CAPSULE | Refills: 12 | Status: SHIPPED | OUTPATIENT
Start: 2018-04-30 | End: 2019-05-13 | Stop reason: SDUPTHER

## 2018-04-30 NOTE — PROGRESS NOTES
Chief Complaint   Patient presents with   • Cough     head/chest congestion, body aches, and chills X Friday.     Subjective   Carola Resendiz is a 62 y.o. female.     Her  has the same symptoms.  She also has been having some sneezing.  There are a lot of others at work with similar sxs.     Of note blood pressure is elevated again.  Patient states her home readings run approximately 120/76, she believes she has white coat syndrome as it is always slightly elevated at doctor's office.  Nice and he chest pain, palpitations or edema.      Cough   This is a new problem. The current episode started in the past 7 days. The problem has been gradually worsening. The problem occurs hourly. The cough is productive of sputum. Associated symptoms include chills, ear congestion, headaches, myalgias, nasal congestion, postnasal drip, rhinorrhea, a sore throat, shortness of breath and wheezing. Pertinent negatives include no ear pain, fever, hemoptysis or sweats. The symptoms are aggravated by lying down. She has tried a beta-agonist inhaler and OTC cough suppressant (mucinex and delsym) for the symptoms. Her past medical history is significant for COPD.        The following portions of the patient's history were reviewed and updated as appropriate: allergies, current medications, past family history, past medical history, past social history, past surgical history and problem list.    Review of Systems   Constitutional: Positive for chills. Negative for fever.   HENT: Positive for postnasal drip, rhinorrhea and sore throat. Negative for ear pain.    Respiratory: Positive for cough, shortness of breath and wheezing. Negative for hemoptysis.    Musculoskeletal: Positive for myalgias.   Neurological: Positive for headaches.       Objective   /80   Pulse 76   Temp 97.8 °F (36.6 °C)   SpO2 97%   There is no height or weight on file to calculate BMI.  Physical Exam   Constitutional: She is oriented to person, place,  and time. She appears well-developed and well-nourished.   HENT:   Head: Normocephalic and atraumatic.   Mouth/Throat: Oropharynx is clear and moist. No oropharyngeal exudate.   Tympanic membranes are completely obscured by wax in canals bilaterally, unable to remove wax with curet, wax was flushed with warm water bilaterally and patient tolerated this well.  Tympanic membrane slightly dull bilaterally.  Turbinates inflamed and red with thick white rhinorrhea, white postnasal drip noted.  Frontal sinuses tender to palpation.   Eyes: Conjunctivae and EOM are normal. Pupils are equal, round, and reactive to light.   Neck: Normal range of motion. Neck supple. No thyromegaly present.   Cardiovascular: Normal rate, regular rhythm and normal heart sounds.    No murmur heard.  Pulmonary/Chest: Effort normal. No respiratory distress. She has no wheezes. She has no rales.   Slightly diminished breath sounds throughout, no wheezes or rhonchi noted   Lymphadenopathy:     She has no cervical adenopathy.   Neurological: She is alert and oriented to person, place, and time. No cranial nerve deficit.   Psychiatric: She has a normal mood and affect. Judgment normal.   Nursing note and vitals reviewed.      Assessment/Plan   Carola Resendiz is here today and the following problems have been addressed:      Carola was seen today for cough.    Diagnoses and all orders for this visit:    Subacute pansinusitis    Bilateral impacted cerumen    Elevated blood-pressure reading without diagnosis of hypertension    Other orders  -     predniSONE (DELTASONE) 20 MG tablet; Take 1 tablet by mouth Daily.  -     cefuroxime (CEFTIN) 500 MG tablet; Take 1 tablet by mouth 2 (Two) Times a Day.    Flu A and B are negative  Patient given prednisone 20 mg daily for 5 days due to bronchospasm from upper respiratory infection  Given Ceftin 500 mg twice a day for sinus infection  Cerumen impaction flushed bilaterally today, patient tolerated this  well  Increase fluids and rest  Continue spiriva and use albuterol when necessary for shortness of breath and wheezing  Also recommend over-the-counter Coricidin HBP for flu and cold, take as directed    Return to clinic if symptoms worsen or persist    Please note that portions of this note were completed with a voice recognition program.  Efforts were made to edit dictation, but occasionally words are mistranscribed.

## 2018-06-27 ENCOUNTER — OFFICE VISIT (OUTPATIENT)
Dept: INTERNAL MEDICINE | Facility: CLINIC | Age: 63
End: 2018-06-27

## 2018-06-27 VITALS
SYSTOLIC BLOOD PRESSURE: 138 MMHG | DIASTOLIC BLOOD PRESSURE: 76 MMHG | OXYGEN SATURATION: 95 % | TEMPERATURE: 97.9 F | HEART RATE: 72 BPM

## 2018-06-27 DIAGNOSIS — J44.1 COPD WITH ACUTE EXACERBATION (HCC): Primary | ICD-10-CM

## 2018-06-27 PROBLEM — H61.23 BILATERAL IMPACTED CERUMEN: Status: RESOLVED | Noted: 2018-04-30 | Resolved: 2018-06-27

## 2018-06-27 PROBLEM — J01.40 SUBACUTE PANSINUSITIS: Status: RESOLVED | Noted: 2018-04-30 | Resolved: 2018-06-27

## 2018-06-27 PROCEDURE — 99213 OFFICE O/P EST LOW 20 MIN: CPT | Performed by: INTERNAL MEDICINE

## 2018-06-27 RX ORDER — METHYLPREDNISOLONE 4 MG/1
TABLET ORAL
Qty: 1 EACH | Refills: 0 | Status: SHIPPED | OUTPATIENT
Start: 2018-06-27 | End: 2018-10-17

## 2018-06-27 RX ORDER — AZITHROMYCIN 250 MG/1
TABLET, FILM COATED ORAL
Qty: 6 TABLET | Refills: 0 | Status: SHIPPED | OUTPATIENT
Start: 2018-06-27 | End: 2018-10-17

## 2018-06-27 NOTE — PROGRESS NOTES
Chief Complaint   Patient presents with   • Cough     congestion, chills, headache, sneezing, tightness in chest, and sinus pressure/pain X Sunday.     Subjective   Carola Resendiz is a 62 y.o. female.     Cough   This is a new problem. The current episode started in the past 7 days. The problem has been gradually worsening. The problem occurs hourly. The cough is productive of sputum. Associated symptoms include chills, a fever, headaches, myalgias, nasal congestion, postnasal drip, a sore throat, shortness of breath, sweats and wheezing. Pertinent negatives include no ear congestion, ear pain or rhinorrhea. The symptoms are aggravated by lying down. She has tried body position changes (tylenol, proAir) for the symptoms. The treatment provided mild relief. Her past medical history is significant for COPD.        The following portions of the patient's history were reviewed and updated as appropriate: allergies, current medications, past family history, past medical history, past social history, past surgical history and problem list.    Review of Systems   Constitutional: Positive for chills and fever.   HENT: Positive for postnasal drip and sore throat. Negative for ear pain and rhinorrhea.    Respiratory: Positive for cough, shortness of breath and wheezing.    Musculoskeletal: Positive for myalgias.   Neurological: Positive for headaches.       Objective   /76   Pulse 72   Temp 97.9 °F (36.6 °C)   SpO2 95%   There is no height or weight on file to calculate BMI.  Physical Exam   Constitutional: She is oriented to person, place, and time. She appears well-developed and well-nourished.   Pleasant female, dry cough   HENT:   Head: Normocephalic and atraumatic.   Right Ear: External ear normal.   Left Ear: External ear normal.   Mouth/Throat: Oropharynx is clear and moist.   Tympanic membranes normal, oropharynx clear   Eyes: Conjunctivae and EOM are normal. Pupils are equal, round, and reactive to light.    Neck: Normal range of motion. Neck supple. No thyromegaly present.   Cardiovascular: Normal rate, regular rhythm and normal heart sounds.    No murmur heard.  Pulmonary/Chest: Effort normal. No respiratory distress.   Decreased breath sounds in all lung fields   Lymphadenopathy:     She has no cervical adenopathy.   Neurological: She is alert and oriented to person, place, and time. No cranial nerve deficit.   Psychiatric: She has a normal mood and affect. Judgment normal.   Nursing note and vitals reviewed.      Assessment/Plan   Carola Resendiz is here today and the following problems have been addressed:      Carola was seen today for cough.    Diagnoses and all orders for this visit:    COPD with acute exacerbation    Other orders  -     MethylPREDNISolone (MEDROL, MILE,) 4 MG tablet; Take as directed on package instructions.  -     azithromycin (ZITHROMAX) 250 MG tablet; Take 2 tablets the first day, then 1 tablet daily for 4 days.  -     fluticasone-salmeterol (ADVAIR DISKUS) 250-50 MCG/DOSE DISKUS; Inhale 1 puff 2 (Two) Times a Day.    given medrol dose mile to take as directed  Given Zpak to take as directed  Start advair 250/50 take one puff BID-told pt to rinse mouth afterwards  Patient has pro-air inhaler at home, she was encouraged to use this every 3-4 hours as needed    RTC prn if sxs worsen or persist    Please note that portions of this note were completed with a voice recognition program.  Efforts were made to edit dictation, but occasionally words are mistranscribed.

## 2018-07-05 RX ORDER — LEVOTHYROXINE SODIUM 0.07 MG/1
TABLET ORAL
Qty: 30 TABLET | Refills: 10 | Status: SHIPPED | OUTPATIENT
Start: 2018-07-05 | End: 2019-06-18 | Stop reason: SDUPTHER

## 2018-07-16 ENCOUNTER — TRANSCRIBE ORDERS (OUTPATIENT)
Dept: ADMINISTRATIVE | Facility: HOSPITAL | Age: 63
End: 2018-07-16

## 2018-07-16 DIAGNOSIS — J44.9 CHRONIC OBSTRUCTIVE PULMONARY DISEASE, UNSPECIFIED COPD TYPE (HCC): Primary | ICD-10-CM

## 2018-07-19 ENCOUNTER — APPOINTMENT (OUTPATIENT)
Dept: CT IMAGING | Facility: HOSPITAL | Age: 63
End: 2018-07-19
Attending: INTERNAL MEDICINE

## 2018-07-25 ENCOUNTER — APPOINTMENT (OUTPATIENT)
Dept: CT IMAGING | Facility: HOSPITAL | Age: 63
End: 2018-07-25
Attending: INTERNAL MEDICINE

## 2018-07-30 ENCOUNTER — TRANSCRIBE ORDERS (OUTPATIENT)
Dept: CT IMAGING | Facility: HOSPITAL | Age: 63
End: 2018-07-30

## 2018-07-30 DIAGNOSIS — Z87.891 PERSONAL HISTORY OF TOBACCO USE, PRESENTING HAZARDS TO HEALTH: Primary | ICD-10-CM

## 2018-08-02 ENCOUNTER — APPOINTMENT (OUTPATIENT)
Dept: CT IMAGING | Facility: HOSPITAL | Age: 63
End: 2018-08-02
Attending: INTERNAL MEDICINE

## 2018-10-17 ENCOUNTER — OFFICE VISIT (OUTPATIENT)
Dept: INTERNAL MEDICINE | Facility: CLINIC | Age: 63
End: 2018-10-17

## 2018-10-17 VITALS
HEART RATE: 64 BPM | TEMPERATURE: 97.5 F | BODY MASS INDEX: 27.56 KG/M2 | WEIGHT: 146 LBS | OXYGEN SATURATION: 96 % | DIASTOLIC BLOOD PRESSURE: 80 MMHG | SYSTOLIC BLOOD PRESSURE: 122 MMHG | HEIGHT: 61 IN

## 2018-10-17 DIAGNOSIS — N39.0 ACUTE UTI: Primary | ICD-10-CM

## 2018-10-17 LAB
BILIRUB BLD-MCNC: NEGATIVE MG/DL
CLARITY, POC: CLEAR
COLOR UR: YELLOW
GLUCOSE UR STRIP-MCNC: NEGATIVE MG/DL
KETONES UR QL: NEGATIVE
LEUKOCYTE EST, POC: ABNORMAL
NITRITE UR-MCNC: NEGATIVE MG/ML
PH UR: 5 [PH] (ref 5–8)
PROT UR STRIP-MCNC: NEGATIVE MG/DL
RBC # UR STRIP: NEGATIVE /UL
SP GR UR: 1.02 (ref 1–1.03)
UROBILINOGEN UR QL: NORMAL

## 2018-10-17 PROCEDURE — 81003 URINALYSIS AUTO W/O SCOPE: CPT | Performed by: PHYSICIAN ASSISTANT

## 2018-10-17 PROCEDURE — 99213 OFFICE O/P EST LOW 20 MIN: CPT | Performed by: PHYSICIAN ASSISTANT

## 2018-10-17 RX ORDER — NITROFURANTOIN 25; 75 MG/1; MG/1
100 CAPSULE ORAL EVERY 12 HOURS SCHEDULED
Qty: 14 CAPSULE | Refills: 0 | Status: SHIPPED | OUTPATIENT
Start: 2018-10-17 | End: 2019-04-04

## 2018-10-17 NOTE — PROGRESS NOTES
Subjective     Chief Complaint: dysuria    History of Present Illness     Carola Resendiz is a 62 y.o. female presenting with complaints of dysuria, urgency, frequency ×2 days.  She denies any fevers, chills, flank pain, discharge, hematuria,  nausea, vomiting, diarrhea.  Drinks an adequate amount of water, states she is still eating well.    The following portions of the patient's history were reviewed and updated as appropriate: current medications, allergies, PMH.    Review of Systems   Constitutional: Negative for appetite change, chills, fatigue, fever and unexpected weight change.   HENT: Negative for congestion, ear pain, hearing loss, nosebleeds, sinus pressure, sore throat, tinnitus and trouble swallowing.    Eyes: Negative for pain, discharge, redness, itching and visual disturbance.   Respiratory: Negative for cough, chest tightness, shortness of breath and wheezing.    Cardiovascular: Negative for chest pain, palpitations and leg swelling.   Gastrointestinal: Negative for abdominal pain, blood in stool, constipation, diarrhea, nausea and vomiting.   Endocrine: Negative for cold intolerance, heat intolerance, polydipsia, polyphagia and polyuria.   Genitourinary: Positive for dysuria, frequency and urgency. Negative for decreased urine volume, flank pain and hematuria.   Musculoskeletal: Negative for arthralgias, back pain, gait problem, joint swelling, myalgias, neck pain and neck stiffness.   Skin: Negative for color change and rash.   Allergic/Immunologic: Negative for environmental allergies, food allergies and immunocompromised state.   Neurological: Negative for dizziness, syncope, weakness, light-headedness and headaches.   Hematological: Negative for adenopathy. Does not bruise/bleed easily.   Psychiatric/Behavioral: Negative for dysphoric mood, sleep disturbance and suicidal ideas. The patient is not nervous/anxious.        Objective     Vitals:    10/17/18 1314   BP: 122/80   Pulse: 64   Temp:  "97.5 °F (36.4 °C)   SpO2: 96%   Weight: 66.2 kg (146 lb)   Height: 154.9 cm (60.98\")       Physical Exam   Constitutional: Appears well-developed and well-nourished.   Cardiovascular: Normal rate, regular rhythm.  Pulmonary/Chest: Effort normal and breath sounds normal.   Abdominal: Suprapubic tenderness noted on exam. Soft. Bowel sounds are normal. There is no CVA tenderness.   Musculoskeletal: Normal range of motion.   Neurological: Alert and oriented to person, place, and time.   Skin: Skin is warm and dry.   Psychiatric: Exhibits a normal mood and affect.     Assessment/Plan     Diagnoses and all orders for this visit:    Acute UTI  -     POCT urinalysis dipstick, automated  -     Urine Culture - Urine, Urine, Clean Catch  -     nitrofurantoin, macrocrystal-monohydrate, (MACROBID) 100 MG capsule; Take 1 capsule by mouth Every 12 (Twelve) Hours.    Increase water intake. Culture urine, adjust antibiotic if needed.    Loraine Urrutia PA-C  10/17/2018         Please note that portions of this note were completed with a voice recognition program. Efforts were made to edit dictation, but occasionally words are mistranscribed.  "

## 2018-10-21 LAB
BACTERIA UR CULT: ABNORMAL
BACTERIA UR CULT: ABNORMAL
OTHER ANTIBIOTIC SUSC ISLT: ABNORMAL

## 2019-04-04 ENCOUNTER — OFFICE VISIT (OUTPATIENT)
Dept: INTERNAL MEDICINE | Facility: CLINIC | Age: 64
End: 2019-04-04

## 2019-04-04 VITALS
OXYGEN SATURATION: 98 % | HEIGHT: 60 IN | WEIGHT: 143 LBS | BODY MASS INDEX: 28.07 KG/M2 | TEMPERATURE: 98.2 F | SYSTOLIC BLOOD PRESSURE: 132 MMHG | HEART RATE: 84 BPM | DIASTOLIC BLOOD PRESSURE: 78 MMHG

## 2019-04-04 DIAGNOSIS — J06.9 ACUTE URI: Primary | ICD-10-CM

## 2019-04-04 PROCEDURE — 99213 OFFICE O/P EST LOW 20 MIN: CPT | Performed by: FAMILY MEDICINE

## 2019-04-04 RX ORDER — OSELTAMIVIR PHOSPHATE 75 MG/1
75 CAPSULE ORAL 2 TIMES DAILY
Refills: 0 | COMMUNITY
Start: 2019-04-03 | End: 2019-04-04

## 2019-04-04 RX ORDER — FLUTICASONE PROPIONATE 50 MCG
2 SPRAY, SUSPENSION (ML) NASAL DAILY
Qty: 1 BOTTLE | Refills: 0 | Status: SHIPPED | OUTPATIENT
Start: 2019-04-04 | End: 2019-07-11 | Stop reason: SDUPTHER

## 2019-04-04 RX ORDER — METHYLPREDNISOLONE 4 MG/1
TABLET ORAL
Qty: 21 EACH | Refills: 0 | Status: SHIPPED | OUTPATIENT
Start: 2019-04-04 | End: 2019-07-11

## 2019-04-04 NOTE — PROGRESS NOTES
Carola Resendiz is a 63 y.o. female.    Chief Complaint   Patient presents with   • Fever   • Chills     x 4 days    • URI     x 4 days        HPI   Patient reports they have not been feeling well for 4day(s). She admits to rhinorrhea, nasal congestion, headache, fever, body aches, fatigue, chills.  She denies sore throat.  They have tried mucinex, delsym and tylenol for this issue without good response.  She went ot the [facility] and was treated for the flu despite 2 negative flu tests.  She did not take the tamiflu they prescribed.  She does have positive sick contacts with the flu.     The following portions of the patient's history were reviewed and updated as appropriate: allergies, current medications, past family history, past medical history, past social history, past surgical history and problem list.     No Known Allergies      Current Outpatient Medications:   •  albuterol (PROAIR HFA) 108 (90 BASE) MCG/ACT inhaler, ProAir  (90 Base) MCG/ACT Inhalation Aerosol Solution; Patient Sig: ProAir  (90 Base) MCG/ACT Inhalation Aerosol Solution INHALE 1 TO 2 PUFFS EVERY 4 TO 6 HOURS AS NEEDED.; 1; 1; 24-Mar-2013; Active, Disp: , Rfl:   •  levothyroxine (SYNTHROID, LEVOTHROID) 75 MCG tablet, take 1 tablet by mouth once daily, Disp: 30 tablet, Rfl: 10  •  omeprazole (priLOSEC) 40 MG capsule, Take 1 capsule by mouth Daily., Disp: 30 capsule, Rfl: 12  •  fluticasone (FLONASE) 50 MCG/ACT nasal spray, 2 sprays into the nostril(s) as directed by provider Daily., Disp: 1 bottle, Rfl: 0  •  fluticasone-salmeterol (ADVAIR DISKUS) 250-50 MCG/DOSE DISKUS, Inhale 1 puff 2 (Two) Times a Day., Disp: 60 each, Rfl: 2  •  methylPREDNISolone (MEDROL, JOE,) 4 MG tablet, Take as directed on package instructions., Disp: 21 each, Rfl: 0  •  MUCINEX 600 MG 12 hr tablet, Take 600 mg by mouth Every 12 (Twelve) Hours., Disp: , Rfl: 0  •  SPIRIVA RESPIMAT 2.5 MCG/ACT aerosol solution, , Disp: , Rfl: 1    ROS    Review of  "Systems   Constitutional: Positive for chills, fatigue and fever.   HENT: Positive for congestion, postnasal drip and rhinorrhea. Negative for sore throat.    Eyes: Negative for blurred vision and visual disturbance.   Respiratory: Positive for cough. Negative for shortness of breath and wheezing.    Cardiovascular: Negative for chest pain and leg swelling.   Gastrointestinal: Negative for abdominal pain, constipation, diarrhea, nausea and vomiting.   Musculoskeletal: Positive for arthralgias and myalgias.   Skin: Negative for color change and rash.   Neurological: Positive for headache. Negative for weakness.       Vitals:    04/04/19 1407   BP: 132/78   BP Location: Left arm   Patient Position: Sitting   Cuff Size: Adult   Pulse: 84   Temp: 98.2 °F (36.8 °C)   TempSrc: Oral   SpO2: 98%   Weight: 64.9 kg (143 lb)   Height: 152.4 cm (60\")     Body mass index is 27.93 kg/m².    Physical Exam     Physical Exam   Constitutional: She is oriented to person, place, and time. She appears well-developed and well-nourished. No distress.   HENT:   Head: Normocephalic and atraumatic.   Right Ear: Tympanic membrane and external ear normal.   Left Ear: Tympanic membrane and external ear normal.   Nose: Right sinus exhibits maxillary sinus tenderness and frontal sinus tenderness. Left sinus exhibits maxillary sinus tenderness and frontal sinus tenderness.   Mouth/Throat: Posterior oropharyngeal erythema (PND) present.   Eyes: Conjunctivae and EOM are normal.   Neck: Normal range of motion. Neck supple.   Cardiovascular: Normal rate and regular rhythm.   No murmur heard.  Pulmonary/Chest: Effort normal and breath sounds normal. No respiratory distress. She has no wheezes.   Abdominal: Soft. Bowel sounds are normal. She exhibits no distension. There is no tenderness.   Lymphadenopathy:     She has no cervical adenopathy.   Neurological: She is alert and oriented to person, place, and time. No cranial nerve deficit.   Skin: Skin is " warm and dry.   Psychiatric: She has a normal mood and affect. Her behavior is normal.       Assessment/Plan    Problem List Items Addressed This Visit        Respiratory    Acute URI - Primary     Likely viral in nature. Patient feeling some better today.  Advised to continue delsym for cough.  Will treat with flonase for congestion and drainage.  Will place on medrol dosepack to prevent COPD flare.                New Medications Ordered This Visit   Medications   • fluticasone (FLONASE) 50 MCG/ACT nasal spray     Si sprays into the nostril(s) as directed by provider Daily.     Dispense:  1 bottle     Refill:  0   • methylPREDNISolone (MEDROL, JOE,) 4 MG tablet     Sig: Take as directed on package instructions.     Dispense:  21 each     Refill:  0       No orders of the defined types were placed in this encounter.      Return if symptoms worsen or fail to improve.    Ignacia Sun, DO

## 2019-04-04 NOTE — ASSESSMENT & PLAN NOTE
Likely viral in nature. Patient feeling some better today.  Advised to continue delsym for cough.  Will treat with flonase for congestion and drainage.  Will place on medrol dosepack to prevent COPD flare.

## 2019-05-13 RX ORDER — OMEPRAZOLE 40 MG/1
CAPSULE, DELAYED RELEASE ORAL
Qty: 90 CAPSULE | Refills: 0 | Status: SHIPPED | OUTPATIENT
Start: 2019-05-13 | End: 2019-07-11 | Stop reason: SDUPTHER

## 2019-06-18 RX ORDER — LEVOTHYROXINE SODIUM 0.07 MG/1
75 TABLET ORAL DAILY
Qty: 30 TABLET | Refills: 0 | Status: SHIPPED | OUTPATIENT
Start: 2019-06-18 | End: 2019-07-11 | Stop reason: SDUPTHER

## 2019-07-11 ENCOUNTER — OFFICE VISIT (OUTPATIENT)
Dept: INTERNAL MEDICINE | Facility: CLINIC | Age: 64
End: 2019-07-11

## 2019-07-11 VITALS
DIASTOLIC BLOOD PRESSURE: 68 MMHG | HEIGHT: 60 IN | SYSTOLIC BLOOD PRESSURE: 120 MMHG | OXYGEN SATURATION: 94 % | BODY MASS INDEX: 28.47 KG/M2 | WEIGHT: 145 LBS | TEMPERATURE: 98 F | HEART RATE: 67 BPM

## 2019-07-11 DIAGNOSIS — E03.8 OTHER SPECIFIED HYPOTHYROIDISM: ICD-10-CM

## 2019-07-11 DIAGNOSIS — Z12.31 SCREENING MAMMOGRAM, ENCOUNTER FOR: ICD-10-CM

## 2019-07-11 DIAGNOSIS — J43.9 PULMONARY EMPHYSEMA, UNSPECIFIED EMPHYSEMA TYPE (HCC): Primary | ICD-10-CM

## 2019-07-11 DIAGNOSIS — K21.9 GASTROESOPHAGEAL REFLUX DISEASE WITHOUT ESOPHAGITIS: ICD-10-CM

## 2019-07-11 PROBLEM — J06.9 ACUTE URI: Status: RESOLVED | Noted: 2019-04-04 | Resolved: 2019-07-11

## 2019-07-11 PROBLEM — J44.1 COPD WITH ACUTE EXACERBATION: Status: RESOLVED | Noted: 2018-06-27 | Resolved: 2019-07-11

## 2019-07-11 PROCEDURE — 99214 OFFICE O/P EST MOD 30 MIN: CPT | Performed by: INTERNAL MEDICINE

## 2019-07-11 PROCEDURE — 90471 IMMUNIZATION ADMIN: CPT | Performed by: INTERNAL MEDICINE

## 2019-07-11 PROCEDURE — 90632 HEPA VACCINE ADULT IM: CPT | Performed by: INTERNAL MEDICINE

## 2019-07-11 RX ORDER — LEVOTHYROXINE SODIUM 0.07 MG/1
75 TABLET ORAL DAILY
Qty: 90 TABLET | Refills: 0 | Status: SHIPPED | OUTPATIENT
Start: 2019-07-11 | End: 2019-10-17 | Stop reason: SDUPTHER

## 2019-07-11 RX ORDER — FLUTICASONE PROPIONATE 50 MCG
2 SPRAY, SUSPENSION (ML) NASAL DAILY
Qty: 1 BOTTLE | Refills: 0 | Status: SHIPPED | OUTPATIENT
Start: 2019-07-11 | End: 2020-06-04

## 2019-07-11 RX ORDER — OMEPRAZOLE 40 MG/1
40 CAPSULE, DELAYED RELEASE ORAL DAILY
Qty: 90 CAPSULE | Refills: 3 | Status: SHIPPED | OUTPATIENT
Start: 2019-07-11 | End: 2020-08-13 | Stop reason: SDUPTHER

## 2019-07-11 NOTE — PROGRESS NOTES
"Chief Complaint   Patient presents with   • Follow-up     for COPD, GERD, and Hypothyroidism.     Subjective   Carola Resendiz is a 63 y.o. female.     Here today for follow up of COPD, GERD, hypothyroidism and allergies.  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    COPD- she is not using advair.  She uses spiriva on and off.  Has not needed her albuterol lately.  She has had one pneumonia shot.   GERD- she takes omeprazole as needed.  She continues to have GERD symptoms usually late at night after she lays down to sleep, this occurs usually 1 to 2 days a week  hypothyroid- takes her med daily on empty stomach.  No difficulty swallowing, changes in voice  Allergies- she has not needed any medications recently  HCM-last mammogram was 2017.  Last colonoscopy 2016 with Dr Manuel and was normal.  Has not had Hep A or shingles vaccine.          The following portions of the patient's history were reviewed and updated as appropriate: allergies, current medications, past family history, past medical history, past social history, past surgical history and problem list.    Review of Systems   Constitutional: Negative for activity change, appetite change and unexpected weight change.   HENT: Negative for trouble swallowing and voice change.    Respiratory: Positive for shortness of breath. Negative for cough and wheezing.    Gastrointestinal:        Occasional GERD symptoms   Endocrine: Negative for cold intolerance and heat intolerance.   Allergic/Immunologic:        Occasional allergy symptoms, not currently active   Psychiatric/Behavioral: Negative for dysphoric mood and sleep disturbance.       Objective   /68   Pulse 67   Temp 98 °F (36.7 °C)   Ht 152.4 cm (60\")   Wt 65.8 kg (145 lb)   SpO2 94%   BMI 28.32 kg/m²   Body mass index is 28.32 kg/m².  Physical Exam   Constitutional: She is oriented to person, place, and time. She appears well-developed and " well-nourished. No distress.   Very pleasant female in no distress today   HENT:   Head: Normocephalic and atraumatic.   Right Ear: External ear normal.   Left Ear: External ear normal.   Mouth/Throat: Oropharynx is clear and moist.   Scant amount of clear postnasal drip noted   Eyes: Conjunctivae and EOM are normal. Pupils are equal, round, and reactive to light.   Neck: Normal range of motion. Neck supple. No thyromegaly present.   Cardiovascular: Normal rate, regular rhythm, normal heart sounds and intact distal pulses.   No murmur heard.  No carotid bruits   Pulmonary/Chest: Effort normal and breath sounds normal. No respiratory distress. She has no wheezes.   Abdominal: Soft. Bowel sounds are normal. She exhibits no distension. There is no tenderness.   Musculoskeletal: Normal range of motion. She exhibits no edema.   Lymphadenopathy:     She has no cervical adenopathy.   Neurological: She is alert and oriented to person, place, and time. No cranial nerve deficit. Coordination normal.   Psychiatric: She has a normal mood and affect. Her behavior is normal. Judgment and thought content normal.   Nursing note and vitals reviewed.      Assessment/Plan   Carola Resendiz is here today and the following problems have been addressed:      Carola was seen today for follow-up.    Diagnoses and all orders for this visit:    Pulmonary emphysema, unspecified emphysema type (CMS/HCC)    Gastroesophageal reflux disease without esophagitis  -     CBC & Differential  -     Comprehensive Metabolic Panel    Other specified hypothyroidism  -     Lipid Panel With / Chol / HDL Ratio  -     T4, Free  -     TSH    Screening mammogram, encounter for  -     Mammo Screening Digital Tomosynthesis Bilateral With CAD; Future    Other orders  -     fluticasone (FLONASE) 50 MCG/ACT nasal spray; 2 sprays into the nostril(s) as directed by provider Daily.  -     levothyroxine (SYNTHROID, LEVOTHROID) 75 MCG tablet; Take 1 tablet by mouth  Daily.  -     fluticasone-salmeterol (ADVAIR DISKUS) 250-50 MCG/DOSE DISKUS; Inhale 1 puff 2 (Two) Times a Day.  -     omeprazole (priLOSEC) 40 MG capsule; Take 1 capsule by mouth Daily.    continue inhalers as needed  Recommend omeprazole as needed  Recommend wt loss to help her GERD  Labs as noted  Continue levothyroxine, will adjust dose if needed  Mammogram ordered  Hep A vaccine given today, due in 6 mo  Recommend shingles vaccine at pharmacy    RTC 6 mo    Please note that portions of this note were completed with a voice recognition program.  Efforts were made to edit dictation, but occasionally words are mistranscribed.

## 2019-07-19 LAB
ALBUMIN SERPL-MCNC: 4.1 G/DL (ref 3.5–5)
ALBUMIN/GLOB SERPL: 1.3 G/DL (ref 1–2)
ALP SERPL-CCNC: 81 U/L (ref 38–126)
ALT SERPL-CCNC: 17 U/L (ref 13–69)
AST SERPL-CCNC: 25 U/L (ref 15–46)
BASOPHILS # BLD AUTO: 0.04 10*3/MM3 (ref 0–0.2)
BASOPHILS NFR BLD AUTO: 0.6 % (ref 0–1.5)
BILIRUB SERPL-MCNC: 0.4 MG/DL (ref 0.2–1.3)
BUN SERPL-MCNC: 17 MG/DL (ref 7–20)
BUN/CREAT SERPL: 28.3 (ref 7.1–23.5)
CALCIUM SERPL-MCNC: 9.4 MG/DL (ref 8.4–10.2)
CHLORIDE SERPL-SCNC: 102 MMOL/L (ref 98–107)
CHOLEST SERPL-MCNC: 213 MG/DL (ref 0–199)
CHOLEST/HDLC SERPL: 2.73 {RATIO}
CO2 SERPL-SCNC: 31 MMOL/L (ref 26–30)
CREAT SERPL-MCNC: 0.6 MG/DL (ref 0.6–1.3)
EOSINOPHIL # BLD AUTO: 0.4 10*3/MM3 (ref 0–0.4)
EOSINOPHIL NFR BLD AUTO: 5.9 % (ref 0.3–6.2)
ERYTHROCYTE [DISTWIDTH] IN BLOOD BY AUTOMATED COUNT: 12.4 % (ref 12.3–15.4)
GLOBULIN SER CALC-MCNC: 3.1 GM/DL
GLUCOSE SERPL-MCNC: 86 MG/DL (ref 74–98)
HCT VFR BLD AUTO: 37.7 % (ref 34–46.6)
HDLC SERPL-MCNC: 78 MG/DL (ref 40–60)
HGB BLD-MCNC: 12.4 G/DL (ref 12–15.9)
IMM GRANULOCYTES # BLD AUTO: 0.01 10*3/MM3 (ref 0–0.05)
IMM GRANULOCYTES NFR BLD AUTO: 0.1 % (ref 0–0.5)
LDLC SERPL CALC-MCNC: 120 MG/DL (ref 0–99)
LYMPHOCYTES # BLD AUTO: 2.09 10*3/MM3 (ref 0.7–3.1)
LYMPHOCYTES NFR BLD AUTO: 30.7 % (ref 19.6–45.3)
MCH RBC QN AUTO: 30.6 PG (ref 26.6–33)
MCHC RBC AUTO-ENTMCNC: 32.9 G/DL (ref 31.5–35.7)
MCV RBC AUTO: 93.1 FL (ref 79–97)
MONOCYTES # BLD AUTO: 0.52 10*3/MM3 (ref 0.1–0.9)
MONOCYTES NFR BLD AUTO: 7.6 % (ref 5–12)
NEUTROPHILS # BLD AUTO: 3.74 10*3/MM3 (ref 1.7–7)
NEUTROPHILS NFR BLD AUTO: 55.1 % (ref 42.7–76)
NRBC BLD AUTO-RTO: 0 /100 WBC (ref 0–0.2)
PLATELET # BLD AUTO: 300 10*3/MM3 (ref 140–450)
POTASSIUM SERPL-SCNC: 5.1 MMOL/L (ref 3.5–5.1)
PROT SERPL-MCNC: 7.2 G/DL (ref 6.3–8.2)
RBC # BLD AUTO: 4.05 10*6/MM3 (ref 3.77–5.28)
SODIUM SERPL-SCNC: 140 MMOL/L (ref 137–145)
T4 FREE SERPL-MCNC: 1.2 NG/DL (ref 0.78–2.19)
TRIGL SERPL-MCNC: 73 MG/DL
TSH SERPL DL<=0.005 MIU/L-ACNC: 3.63 MIU/ML (ref 0.47–4.68)
VLDLC SERPL CALC-MCNC: 14.6 MG/DL
WBC # BLD AUTO: 6.8 10*3/MM3 (ref 3.4–10.8)

## 2019-08-15 ENCOUNTER — HOSPITAL ENCOUNTER (OUTPATIENT)
Dept: MAMMOGRAPHY | Facility: HOSPITAL | Age: 64
Discharge: HOME | End: 2019-08-15
Admitting: INTERNAL MEDICINE

## 2019-08-15 ENCOUNTER — TELEPHONE (OUTPATIENT)
Dept: INTERNAL MEDICINE | Facility: CLINIC | Age: 64
End: 2019-08-15

## 2019-08-15 DIAGNOSIS — Z12.31 SCREENING MAMMOGRAM, ENCOUNTER FOR: ICD-10-CM

## 2019-08-15 PROCEDURE — 77067 SCR MAMMO BI INCL CAD: CPT

## 2019-08-15 PROCEDURE — 77063 BREAST TOMOSYNTHESIS BI: CPT

## 2019-08-15 PROCEDURE — 77063 BREAST TOMOSYNTHESIS BI: CPT | Performed by: RADIOLOGY

## 2019-08-15 PROCEDURE — 77067 SCR MAMMO BI INCL CAD: CPT | Performed by: RADIOLOGY

## 2019-08-15 NOTE — PROGRESS NOTES
Please tell patient that there is an abnormality on right breast and radiologist would like special compression views for further visualization.  She will be contacted with appointment time for this study.

## 2019-08-15 NOTE — TELEPHONE ENCOUNTER
Patient called stating she was returning a missed call from our office. Most recent encounter regarding [facility] MUSE was 07/19/19.    Please advise.

## 2019-08-29 ENCOUNTER — OFFICE VISIT (OUTPATIENT)
Dept: INTERNAL MEDICINE | Facility: CLINIC | Age: 64
End: 2019-08-29

## 2019-08-29 VITALS
TEMPERATURE: 97.7 F | HEART RATE: 63 BPM | WEIGHT: 146 LBS | SYSTOLIC BLOOD PRESSURE: 154 MMHG | DIASTOLIC BLOOD PRESSURE: 64 MMHG | RESPIRATION RATE: 15 BRPM | BODY MASS INDEX: 28.66 KG/M2 | HEIGHT: 60 IN | OXYGEN SATURATION: 98 %

## 2019-08-29 DIAGNOSIS — J01.40 ACUTE PANSINUSITIS, RECURRENCE NOT SPECIFIED: Primary | ICD-10-CM

## 2019-08-29 DIAGNOSIS — J30.2 SEASONAL ALLERGIC RHINITIS, UNSPECIFIED TRIGGER: ICD-10-CM

## 2019-08-29 DIAGNOSIS — H61.23 BILATERAL IMPACTED CERUMEN: ICD-10-CM

## 2019-08-29 DIAGNOSIS — R03.0 ELEVATED BLOOD PRESSURE READING: ICD-10-CM

## 2019-08-29 PROCEDURE — 99214 OFFICE O/P EST MOD 30 MIN: CPT | Performed by: NURSE PRACTITIONER

## 2019-08-29 RX ORDER — DOXYCYCLINE 100 MG/1
100 TABLET ORAL 2 TIMES DAILY
Qty: 20 TABLET | Refills: 0 | Status: SHIPPED | OUTPATIENT
Start: 2019-08-29 | End: 2019-09-08

## 2019-08-29 RX ORDER — LORATADINE 10 MG/1
10 TABLET ORAL DAILY
Qty: 30 TABLET | Refills: 0 | Status: SHIPPED | OUTPATIENT
Start: 2019-08-29 | End: 2020-06-11 | Stop reason: HOSPADM

## 2019-08-29 NOTE — PROGRESS NOTES
Chief Complaint / Reason:      Chief Complaint   Patient presents with   • Sinusitis   • Cough       Subjective     HPI  Patient presents today with complaints of sinusitis and cough.  She states that symptoms have been going on for 3 days and have progressively worsened.  She denies chest pain, shortness of breath or heart palpitations.  Vital signs are stable with exception of elevated blood pressure.  She states that she did try taking Tylenol Sinus with minimal relief.  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  Denies fever but does state that she has been chilling some.  The cough is productive and keeping her awake at night.  Patient does have seasonal allergies and reflux.  History taken from: patient    PMH/FH/Social History were reviewed and updated appropriately in the electronic medical record.   Past Medical History:   Diagnosis Date   • Abdominal pain, RUQ    • Acute bronchitis    • Atypical mole    • Cough    • Diarrhea    • Edema    • Elevated blood pressure reading without diagnosis of hypertension    • Esophageal reflux    • Fever    • Hematochezia    • Labyrinthitis, acute    • Low back pain    • Peptic ulcer    • Shortness of breath    • Sinusitis    • Urinary frequency    • UTI (urinary tract infection)      Past Surgical History:   Procedure Laterality Date   • BREAST CYST ASPIRATION Right     Years ago   • BREAST SURGERY Right     Fibroid removed from breast    • HYSTERECTOMY      Partial - unsure of when     Social History     Socioeconomic History   • Marital status:      Spouse name: Not on file   • Number of children: Not on file   • Years of education: Not on file   • Highest education level: Not on file   Tobacco Use   • Smoking status: Former Smoker   • Smokeless tobacco: Never Used   Substance and Sexual Activity   • Alcohol use: No     Family History   Problem Relation Age of  Onset   • Emphysema Mother    • Heart attack Father    • Prostate cancer Father    • Breast cancer Neg Hx    • Ovarian cancer Neg Hx        Review of Systems:   Review of Systems   Constitutional: Positive for chills and fatigue. Negative for diaphoresis, fever, unexpected weight gain and unexpected weight loss.   HENT: Positive for congestion and sinus pressure.    Respiratory: Positive for cough. Negative for choking, chest tightness and shortness of breath.    Cardiovascular: Negative.  Negative for chest pain and leg swelling.   Gastrointestinal: Negative.  Negative for nausea.   Musculoskeletal: Negative for back pain.   Allergic/Immunologic: Positive for environmental allergies. Negative for food allergies and immunocompromised state.   Neurological: Negative.    Psychiatric/Behavioral: Positive for sleep disturbance. Negative for stress.         All other systems were reviewed and are negative.  Exceptions are noted in the subjective or above.      Objective     Vital Signs  Vitals:    08/29/19 1008   BP: 154/64   Pulse: 63   Resp: 15   Temp: 97.7 °F (36.5 °C)   SpO2: 98%       Body mass index is 28.51 kg/m².    Physical Exam   Constitutional: She is oriented to person, place, and time. She appears well-developed and well-nourished. No distress.   HENT:   Head: Normocephalic and atraumatic.   Right Ear: External ear normal. cerumen impaction is present.  Left Ear: External ear normal. An impacted cerumen is present.  Nose: Mucosal edema, rhinorrhea, sinus tenderness and congestion present. Right sinus exhibits maxillary sinus tenderness. Right sinus exhibits no frontal sinus tenderness. Left sinus exhibits maxillary sinus tenderness. Left sinus exhibits no frontal sinus tenderness.   Mouth/Throat: Oropharynx is clear and moist. No oropharyngeal exudate.   Purulent nasal secretions and unable to visualize TMs due to cerumen impaction   Eyes: Conjunctivae are normal. Pupils are equal, round, and reactive to  light. Right conjunctiva is not injected. Left conjunctiva is not injected.   Red watery eyes bilateral   Cardiovascular: Normal rate, regular rhythm, normal heart sounds and intact distal pulses.   Pulmonary/Chest: Effort normal. No respiratory distress. She has wheezes.   Lymphadenopathy:        Head (right side): No submental, no submandibular and no tonsillar adenopathy present.        Head (left side): No submental, no submandibular and no tonsillar adenopathy present.     She has no cervical adenopathy.   Neurological: She is alert and oriented to person, place, and time.   Skin: Skin is warm and dry. Capillary refill takes less than 2 seconds.   Psychiatric: She has a normal mood and affect. Her behavior is normal. Judgment and thought content normal.   Nursing note and vitals reviewed.           Results Review:    I reviewed the patient's previous clinical results.       Medication Review:     Current Outpatient Medications:   •  albuterol (PROAIR HFA) 108 (90 BASE) MCG/ACT inhaler, ProAir  (90 Base) MCG/ACT Inhalation Aerosol Solution; Patient Sig: ProAir  (90 Base) MCG/ACT Inhalation Aerosol Solution INHALE 1 TO 2 PUFFS EVERY 4 TO 6 HOURS AS NEEDED.; 1; 1; 24-Mar-2013; Active, Disp: , Rfl:   •  fluticasone-salmeterol (ADVAIR DISKUS) 250-50 MCG/DOSE DISKUS, Inhale 1 puff 2 (Two) Times a Day., Disp: 60 each, Rfl: 2  •  levothyroxine (SYNTHROID, LEVOTHROID) 75 MCG tablet, Take 1 tablet by mouth Daily., Disp: 90 tablet, Rfl: 0  •  omeprazole (priLOSEC) 40 MG capsule, Take 1 capsule by mouth Daily., Disp: 90 capsule, Rfl: 3  •  doxycycline (ADOXA) 100 MG tablet, Take 1 tablet by mouth 2 (Two) Times a Day for 10 days., Disp: 20 tablet, Rfl: 0  •  fluticasone (FLONASE) 50 MCG/ACT nasal spray, 2 sprays into the nostril(s) as directed by provider Daily., Disp: 1 bottle, Rfl: 0  •  loratadine (CLARITIN) 10 MG tablet, Take 1 tablet by mouth Daily., Disp: 30 tablet, Rfl: 0  •  SPIRIVA RESPIMAT 2.5  MCG/ACT aerosol solution, , Disp: , Rfl: 1    Assessment/Plan   Carola was seen today for sinusitis and cough.    Diagnoses and all orders for this visit:    Acute pansinusitis, recurrence not specified  -     doxycycline (ADOXA) 100 MG tablet; Take 1 tablet by mouth 2 (Two) Times a Day for 10 days.  Advised patient to increase water intake and recommend she take allergy medicine for a few days before taking antibiotic.  Discussed worsening signs and symptoms with patient she stated understanding.  Bilateral impacted cerumen  Refused to have ears cleaned out today as she needed to get back to work  Elevated blood pressure reading  Advised patient to closely monitor blood pressure at home and contact office if blood pressure remains elevated and avoid medications that increase blood pressure.  Seasonal allergic rhinitis, unspecified trigger  -     loratadine (CLARITIN) 10 MG tablet; Take 1 tablet by mouth Daily.        Return if symptoms worsen or fail to improve.    Kari Sterling, APRN  08/29/2019

## 2019-09-12 ENCOUNTER — HOSPITAL ENCOUNTER (OUTPATIENT)
Dept: ULTRASOUND IMAGING | Facility: HOSPITAL | Age: 64
Discharge: HOME | End: 2019-09-12

## 2019-09-12 ENCOUNTER — HOSPITAL ENCOUNTER (OUTPATIENT)
Dept: MAMMOGRAPHY | Facility: HOSPITAL | Age: 64
Discharge: HOME | End: 2019-09-12
Admitting: RADIOLOGY

## 2019-09-12 DIAGNOSIS — R92.8 ABNORMAL MAMMOGRAM: ICD-10-CM

## 2019-09-12 PROCEDURE — G0279 TOMOSYNTHESIS, MAMMO: HCPCS

## 2019-09-12 PROCEDURE — 77065 DX MAMMO INCL CAD UNI: CPT

## 2019-09-12 PROCEDURE — 77065 DX MAMMO INCL CAD UNI: CPT | Performed by: RADIOLOGY

## 2019-09-12 PROCEDURE — 76642 ULTRASOUND BREAST LIMITED: CPT | Performed by: RADIOLOGY

## 2019-09-12 PROCEDURE — 77061 BREAST TOMOSYNTHESIS UNI: CPT | Performed by: RADIOLOGY

## 2019-09-12 PROCEDURE — 76642 ULTRASOUND BREAST LIMITED: CPT

## 2019-10-17 RX ORDER — LEVOTHYROXINE SODIUM 0.07 MG/1
75 TABLET ORAL DAILY
Qty: 90 TABLET | Refills: 3 | Status: SHIPPED | OUTPATIENT
Start: 2019-10-17 | End: 2020-08-13 | Stop reason: SDUPTHER

## 2019-12-19 ENCOUNTER — OFFICE VISIT (OUTPATIENT)
Dept: INTERNAL MEDICINE | Facility: CLINIC | Age: 64
End: 2019-12-19

## 2019-12-19 ENCOUNTER — TELEPHONE (OUTPATIENT)
Dept: INTERNAL MEDICINE | Facility: CLINIC | Age: 64
End: 2019-12-19

## 2019-12-19 VITALS
SYSTOLIC BLOOD PRESSURE: 130 MMHG | TEMPERATURE: 97.5 F | OXYGEN SATURATION: 96 % | HEART RATE: 70 BPM | DIASTOLIC BLOOD PRESSURE: 72 MMHG

## 2019-12-19 DIAGNOSIS — N34.2 URETHRITIS: ICD-10-CM

## 2019-12-19 DIAGNOSIS — R10.30 LOWER ABDOMINAL PAIN: Primary | ICD-10-CM

## 2019-12-19 LAB
BILIRUB BLD-MCNC: NEGATIVE MG/DL
CLARITY, POC: CLEAR
COLOR UR: YELLOW
GLUCOSE UR STRIP-MCNC: NEGATIVE MG/DL
KETONES UR QL: NEGATIVE
LEUKOCYTE EST, POC: NEGATIVE
NITRITE UR-MCNC: NEGATIVE MG/ML
PH UR: 6 [PH] (ref 5–8)
PROT UR STRIP-MCNC: NEGATIVE MG/DL
RBC # UR STRIP: NEGATIVE /UL
SP GR UR: 1.03 (ref 1–1.03)
UROBILINOGEN UR QL: NORMAL

## 2019-12-19 PROCEDURE — 99213 OFFICE O/P EST LOW 20 MIN: CPT | Performed by: INTERNAL MEDICINE

## 2019-12-19 PROCEDURE — 81003 URINALYSIS AUTO W/O SCOPE: CPT | Performed by: INTERNAL MEDICINE

## 2019-12-19 RX ORDER — NITROFURANTOIN 25; 75 MG/1; MG/1
100 CAPSULE ORAL 2 TIMES DAILY
Qty: 10 CAPSULE | Refills: 0 | Status: SHIPPED | OUTPATIENT
Start: 2019-12-19 | End: 2020-05-19

## 2019-12-19 NOTE — PROGRESS NOTES
Chief Complaint   Patient presents with   • Abstract     Pt states she's been having lower abdominal pain since yesterday. States she has some pain after urination.      Subjective   Carola Resendiz is a 64 y.o. female.     Patient here today with complaints of lower abdominal pain since yesterday.  She also states that she has had painful urination since yesterday.  Urinalysis today is negative though.  She had pain every time she urinated yesterday, then was not as bad last night.  She is urinating every half hour, small amount.  She is still having burning today, just not as badly.   She takes baths, not showers.  She does use body wash in her tub of hot water.  She takes a bath almost every day.        The following portions of the patient's history were reviewed and updated as appropriate: allergies, current medications, past family history, past medical history, past social history, past surgical history and problem list.    Review of Systems   Constitutional: Negative for chills and fever.   Gastrointestinal: Positive for abdominal pain. Negative for constipation, diarrhea and nausea.   Genitourinary: Positive for dysuria and frequency. Negative for hematuria and urgency.       Objective   /72   Pulse 70   Temp 97.5 °F (36.4 °C)   SpO2 96%   There is no height or weight on file to calculate BMI.  Physical Exam   Constitutional: She is oriented to person, place, and time. She appears well-developed and well-nourished. No distress.   Pulmonary/Chest: Effort normal.   Abdominal: Soft. Bowel sounds are normal. She exhibits no distension and no mass. There is tenderness. There is no rebound and no guarding.   No CVA tenderness, mild tenderness over suprapubic area   Neurological: She is alert and oriented to person, place, and time. No cranial nerve deficit.   Psychiatric: She has a normal mood and affect. Her behavior is normal. Judgment and thought content normal.   Nursing note and vitals  reviewed.      Assessment/Plan   Carola Resendiz is here today and the following problems have been addressed:      Carola was seen today for abstract.    Diagnoses and all orders for this visit:    Lower abdominal pain  -     POCT urinalysis dipstick, automated    Urethritis    Other orders  -     nitrofurantoin, macrocrystal-monohydrate, (MACROBID) 100 MG capsule; Take 1 capsule by mouth 2 (Two) Times a Day.    UA is negative  Recommend she avoid soap in her tub, may use epsom salt, recommend showers  Increase fluids and rest  Given 5 days of macrobid for presumed urethritis, may be a chemical irritation though    RTC prn if sxs worsen or persist    Please note that portions of this note were completed with a voice recognition program.  Efforts were made to edit dictation, but occasionally words are mistranscribed.

## 2019-12-19 NOTE — TELEPHONE ENCOUNTER
Pt called cory to schedule appt. I attempted to call the pt but no answer, left msg for pt to call the office and denise appt.

## 2020-05-19 ENCOUNTER — OFFICE VISIT (OUTPATIENT)
Dept: INTERNAL MEDICINE | Facility: CLINIC | Age: 65
End: 2020-05-19

## 2020-05-19 VITALS
WEIGHT: 149 LBS | TEMPERATURE: 97.8 F | HEIGHT: 60 IN | HEART RATE: 79 BPM | SYSTOLIC BLOOD PRESSURE: 124 MMHG | DIASTOLIC BLOOD PRESSURE: 70 MMHG | OXYGEN SATURATION: 94 % | BODY MASS INDEX: 29.25 KG/M2

## 2020-05-19 DIAGNOSIS — M25.552 BILATERAL HIP PAIN: ICD-10-CM

## 2020-05-19 DIAGNOSIS — R10.84 GENERALIZED ABDOMINAL PAIN: Primary | ICD-10-CM

## 2020-05-19 DIAGNOSIS — M25.551 BILATERAL HIP PAIN: ICD-10-CM

## 2020-05-19 PROBLEM — N34.2 URETHRITIS: Status: RESOLVED | Noted: 2019-12-19 | Resolved: 2020-05-19

## 2020-05-19 PROCEDURE — 99214 OFFICE O/P EST MOD 30 MIN: CPT | Performed by: INTERNAL MEDICINE

## 2020-05-19 NOTE — PROGRESS NOTES
"Chief Complaint   Patient presents with   • Abstract     Pt states when she eats something, it feels like it get lodge in middle of stomach. States this has happened twice, once last Thursday and other was about 3 months ago.  Pt states her hips are really sore.      Subjective   Carola Resendiz is a 64 y.o. female.     Patient is here today with complaints of a fullness in her stomach within 10-15 minutes of eating.  It does not matter what she eats or drinks.  Shortly after she eats, she feels something some up from left side of colon and then up into stomach and mid chest.  She feels fullness in her chest. She gets a heat sensation in her back at times and she feels a twisting sensation in left lower abdomen that again radiates up into mid upper abdomen.  No fevers or chills.  No weight loss.  No NV.  No diarrhea or constipation.  No black stool or blood in stool.   She takes her omeprazole with thyroid med together in AM with water before all other meds.  She does not feel as though this is GERD.  She denies any burning or taste of acid in her throat or mouth.  Last colonoscopy March 2016 with Dr Manuel showed diverticulosis and 2 small sessile polyps.      She is also complaining of bilateral hip pain for 3-4 months.  The pain is bothersome all day long.  She feels as though she walks off balance and \"waves \"when she walks.  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  She does have a rubber mat to stand on but does not feel it is very helpful.  She has not tried any OTC meds for this.         The following portions of the patient's history were reviewed and updated as appropriate: allergies, current medications, past family history, past medical history, past social history, past surgical history and problem list.    Review of Systems   Constitutional: Positive for fatigue. Negative for appetite change, chills, fever and unexpected weight change.   HENT: Negative for trouble " "swallowing.    Gastrointestinal: Positive for abdominal distention and abdominal pain. Negative for anal bleeding, blood in stool, constipation, diarrhea, nausea, rectal pain and vomiting.   Musculoskeletal: Positive for arthralgias. Negative for back pain.       Objective   /70   Pulse 79   Temp 97.8 °F (36.6 °C)   Ht 152.4 cm (60\")   Wt 67.6 kg (149 lb)   SpO2 94%   BMI 29.10 kg/m²   Body mass index is 29.1 kg/m².  Physical Exam   Constitutional: She is oriented to person, place, and time. She appears well-developed and well-nourished. No distress.   Pleasant female, appears stated age, in no distress today   HENT:   Head: Normocephalic and atraumatic.   Right Ear: External ear normal.   Left Ear: External ear normal.   Eyes: Pupils are equal, round, and reactive to light. EOM are normal. Right eye exhibits no discharge. Left eye exhibits no discharge.   Neck: Normal range of motion. Neck supple.   Pulmonary/Chest: Effort normal. No respiratory distress.   Abdominal: Soft. She exhibits distension. She exhibits no mass. There is tenderness. There is no rebound and no guarding.   There is mild diffuse tenderness and distention throughout abdomen, no palpable mass and no hepatosplenomegaly   Musculoskeletal: Normal range of motion.   Hips with no significant pain to palpation, no pain with flexion or extension, no pain with rotation   Neurological: She is alert and oriented to person, place, and time. No cranial nerve deficit. Coordination normal.   Skin: No rash noted.   Psychiatric: She has a normal mood and affect. Her behavior is normal. Judgment and thought content normal.   Nursing note and vitals reviewed.      Assessment/Plan   Carola Resendiz is here today and the following problems have been addressed:      Carola was seen today for abstract.    Diagnoses and all orders for this visit:    Generalized abdominal pain  -     CBC & Differential  -     Comprehensive Metabolic Panel  -     C-reactive " Protein  -     CT Abdomen Pelvis With Contrast; Future    Bilateral hip pain  -     C-reactive Protein  -     Cyclic Citrul Peptide Antibody, IgG / IgA  -     Sedimentation Rate    Labs as above  Recommend Tylenol extra strength or Tylenol arthritis 2 tablets a.m. and p.m. for bilateral hip pain.  Suspect DJD, however due to symptoms of fatigue and slowly progressing hip pain over past 4 months will rule out rheumatoid arthritis  CT of abdomen ordered due to worsening left-sided pain.  Will call patient with plan of care and possible referral back to her GI doctor for follow-up  Encouraged patient to take her levothyroxine separate from her omeprazole for better results  Known history of H. pylori, treated in the past and cleared by testing per EGD    Return to clinic as needed if symptoms worsen or persist    Please note that portions of this note were completed with a voice recognition program.  Efforts were made to edit dictation, but occasionally words are mistranscribed.

## 2020-05-21 DIAGNOSIS — M25.551 BILATERAL HIP PAIN: Primary | ICD-10-CM

## 2020-05-21 DIAGNOSIS — M25.552 BILATERAL HIP PAIN: Primary | ICD-10-CM

## 2020-05-21 DIAGNOSIS — R79.82 ELEVATED C-REACTIVE PROTEIN (CRP): ICD-10-CM

## 2020-05-21 LAB
ALBUMIN SERPL-MCNC: 4.6 G/DL (ref 3.5–5.2)
ALBUMIN/GLOB SERPL: 1.4 G/DL
ALP SERPL-CCNC: 94 U/L (ref 39–117)
ALT SERPL-CCNC: 12 U/L (ref 1–33)
AST SERPL-CCNC: 13 U/L (ref 1–32)
BASOPHILS # BLD AUTO: 0.05 10*3/MM3 (ref 0–0.2)
BASOPHILS NFR BLD AUTO: 0.6 % (ref 0–1.5)
BILIRUB SERPL-MCNC: 0.3 MG/DL (ref 0.2–1.2)
BUN SERPL-MCNC: 16 MG/DL (ref 8–23)
BUN/CREAT SERPL: 20.3 (ref 7–25)
CALCIUM SERPL-MCNC: 9.6 MG/DL (ref 8.6–10.5)
CCP IGA+IGG SERPL IA-ACNC: 16 UNITS (ref 0–19)
CHLORIDE SERPL-SCNC: 98 MMOL/L (ref 98–107)
CO2 SERPL-SCNC: 28 MMOL/L (ref 22–29)
CREAT SERPL-MCNC: 0.79 MG/DL (ref 0.57–1)
CRP SERPL-MCNC: 1.21 MG/DL (ref 0–0.5)
EOSINOPHIL # BLD AUTO: 0.37 10*3/MM3 (ref 0–0.4)
EOSINOPHIL NFR BLD AUTO: 4.4 % (ref 0.3–6.2)
ERYTHROCYTE [DISTWIDTH] IN BLOOD BY AUTOMATED COUNT: 11.8 % (ref 12.3–15.4)
ERYTHROCYTE [SEDIMENTATION RATE] IN BLOOD BY WESTERGREN METHOD: 46 MM/HR (ref 0–30)
GLOBULIN SER CALC-MCNC: 3.2 GM/DL
GLUCOSE SERPL-MCNC: 134 MG/DL (ref 65–99)
HCT VFR BLD AUTO: 38.1 % (ref 34–46.6)
HGB BLD-MCNC: 12.9 G/DL (ref 12–15.9)
IMM GRANULOCYTES # BLD AUTO: 0.02 10*3/MM3 (ref 0–0.05)
IMM GRANULOCYTES NFR BLD AUTO: 0.2 % (ref 0–0.5)
LYMPHOCYTES # BLD AUTO: 2.17 10*3/MM3 (ref 0.7–3.1)
LYMPHOCYTES NFR BLD AUTO: 26.1 % (ref 19.6–45.3)
MCH RBC QN AUTO: 30.6 PG (ref 26.6–33)
MCHC RBC AUTO-ENTMCNC: 33.9 G/DL (ref 31.5–35.7)
MCV RBC AUTO: 90.3 FL (ref 79–97)
MONOCYTES # BLD AUTO: 0.58 10*3/MM3 (ref 0.1–0.9)
MONOCYTES NFR BLD AUTO: 7 % (ref 5–12)
NEUTROPHILS # BLD AUTO: 5.14 10*3/MM3 (ref 1.7–7)
NEUTROPHILS NFR BLD AUTO: 61.7 % (ref 42.7–76)
NRBC BLD AUTO-RTO: 0 /100 WBC (ref 0–0.2)
PLATELET # BLD AUTO: 313 10*3/MM3 (ref 140–450)
POTASSIUM SERPL-SCNC: 3.8 MMOL/L (ref 3.5–5.2)
PROT SERPL-MCNC: 7.8 G/DL (ref 6–8.5)
RBC # BLD AUTO: 4.22 10*6/MM3 (ref 3.77–5.28)
SODIUM SERPL-SCNC: 138 MMOL/L (ref 136–145)
WBC # BLD AUTO: 8.33 10*3/MM3 (ref 3.4–10.8)

## 2020-05-21 NOTE — PROGRESS NOTES
Please tell pt that her inflammatory markers are high but rheumatoid arthritis test is negative.  All other labs look good.  I have added an EDMUNDO panel and would like you to please call lab and ask if they could add it to blood drawn yesterday if possible.  If not, ask her to come back in and have it drawn,  I feel it is important to have it done soon while she is having this flare up.

## 2020-05-22 ENCOUNTER — HOSPITAL ENCOUNTER (OUTPATIENT)
Dept: CT IMAGING | Facility: HOSPITAL | Age: 65
Discharge: HOME | End: 2020-05-22
Admitting: INTERNAL MEDICINE

## 2020-05-22 DIAGNOSIS — R10.84 GENERALIZED ABDOMINAL PAIN: ICD-10-CM

## 2020-05-22 DIAGNOSIS — R10.84 GENERALIZED ABDOMINAL PAIN: Primary | ICD-10-CM

## 2020-05-22 PROCEDURE — 25010000002 IOPAMIDOL 61 % SOLUTION: Performed by: INTERNAL MEDICINE

## 2020-05-22 PROCEDURE — 0 DIATRIZOATE MEGLUMINE & SODIUM PER 1 ML: Performed by: INTERNAL MEDICINE

## 2020-05-22 PROCEDURE — 74177 CT ABD & PELVIS W/CONTRAST: CPT

## 2020-05-22 RX ADMIN — IOPAMIDOL 100 ML: 612 INJECTION, SOLUTION INTRAVENOUS at 10:26

## 2020-05-22 RX ADMIN — DIATRIZOATE MEGLUMINE AND DIATRIZOATE SODIUM 30 ML: 660; 100 LIQUID ORAL; RECTAL at 10:26

## 2020-05-22 NOTE — PROGRESS NOTES
Please tell patient that CT scan of abdomen is completely normal.  I would like to make referral to GI, she has seen Dr. Manuel in the past.  I will make referral to Dr. Manuel, however if she is not in the area anymore I will make referral to 1 of her prior partners.

## 2020-05-26 ENCOUNTER — RESULTS ENCOUNTER (OUTPATIENT)
Dept: INTERNAL MEDICINE | Facility: CLINIC | Age: 65
End: 2020-05-26

## 2020-05-26 DIAGNOSIS — R79.82 ELEVATED C-REACTIVE PROTEIN (CRP): ICD-10-CM

## 2020-05-26 DIAGNOSIS — M25.551 BILATERAL HIP PAIN: ICD-10-CM

## 2020-05-26 DIAGNOSIS — M25.552 BILATERAL HIP PAIN: ICD-10-CM

## 2020-05-26 LAB
ANA SER QL: NEGATIVE
Lab: NORMAL
WRITTEN AUTHORIZATION: NORMAL

## 2020-05-27 ENCOUNTER — TELEPHONE (OUTPATIENT)
Dept: INTERNAL MEDICINE | Facility: CLINIC | Age: 65
End: 2020-05-27

## 2020-05-27 NOTE — TELEPHONE ENCOUNTER
Spoke with Ambar, states Pt is still having episodes of being sick with severe abdominal pain, dizziness, and weakness. Daughter states she is really concerned about Pt. I advised her I would discuss this with Dr. Vermeesch tomorrow but it Pt has another episode or starts feeling worse that it would be best for her to go to ER for evaluation.

## 2020-05-27 NOTE — TELEPHONE ENCOUNTER
Daughter Ambar is requesting a call back to discuss pt's condition.      Please advise @ [phone]

## 2020-05-27 NOTE — PROGRESS NOTES
Please tell patient EDMUNDO panel to check for autoimmune diseases is negative.  Her inflammatory markers were elevated which could suggest a flare of degenerative arthritis.  Please ask her if Tylenol has been helpful.  Which she like me to make a referral to a rheumatologist for further evaluation?  If so please let me know and I will place referral.

## 2020-05-28 DIAGNOSIS — R10.84 GENERALIZED ABDOMINAL PAIN: Primary | ICD-10-CM

## 2020-05-28 NOTE — TELEPHONE ENCOUNTER
Please try to get this patient into Dr. Annemarie Manuel as soon as possible.  I placed an order on May 22 for GI consult.  If you are unable to get her into Dr. Manuel, please try to get her into Dr. Noyola here in town.  TY   you may upgrade to urgent or stat on consult if needed.

## 2020-05-28 NOTE — TELEPHONE ENCOUNTER
I faxed the referral this morning, marked as urgent, to Dr. Manuel's office. Provided patient and daughter's phone to contact to schedule.

## 2020-06-02 ENCOUNTER — OFFICE VISIT (OUTPATIENT)
Dept: SURGERY | Facility: CLINIC | Age: 65
End: 2020-06-02

## 2020-06-02 VITALS
HEIGHT: 60 IN | BODY MASS INDEX: 28.82 KG/M2 | DIASTOLIC BLOOD PRESSURE: 70 MMHG | HEART RATE: 70 BPM | TEMPERATURE: 99.3 F | SYSTOLIC BLOOD PRESSURE: 120 MMHG | WEIGHT: 146.8 LBS | OXYGEN SATURATION: 95 %

## 2020-06-02 DIAGNOSIS — R10.10 PAIN OF UPPER ABDOMEN: Primary | ICD-10-CM

## 2020-06-02 DIAGNOSIS — Z01.818 PRE-OP TESTING: ICD-10-CM

## 2020-06-02 PROCEDURE — 99244 OFF/OP CNSLTJ NEW/EST MOD 40: CPT | Performed by: SURGERY

## 2020-06-02 NOTE — PROGRESS NOTES
Patient: Carola Resendiz    YOB: 1955    Date: 06/02/2020    Primary Care Provider: Vermeesch, Marilyn K, MD    Chief Complaint   Patient presents with   • Abdominal Pain       SUBJECTIVE:    History of present illness:  Patient is here for an evaluation and treatment of abdominal pain. She had a recent CT scan that was negative. She complains of sharp LUQ pain, radiates into the RUQ for the past year but has worsened over the past 2 weeks. She complains of associated constipation. She denies diarrhea. She had a colonoscopy in 2016.    Apparently this pain is sharp in nature, located in the right upper quadrant, present over the past year, worsening over the past several weeks, associated with constipation, associated with nausea, fatty food makes it worse, nothing seems to make it better.    The following portions of the patient's history were reviewed and updated as appropriate: allergies, current medications, past family history, past medical history, past social history, past surgical history and problem list.      Review of Systems   Constitutional: Negative for chills, fever and unexpected weight change.   HENT: Negative for hearing loss, trouble swallowing and voice change.    Eyes: Negative for visual disturbance.   Respiratory: Negative for apnea, cough, chest tightness, shortness of breath and wheezing.    Cardiovascular: Negative for chest pain, palpitations and leg swelling.   Gastrointestinal: Positive for abdominal pain and constipation. Negative for abdominal distention, anal bleeding, blood in stool, diarrhea, nausea, rectal pain and vomiting.   Endocrine: Negative for cold intolerance and heat intolerance.   Genitourinary: Negative for difficulty urinating, dysuria and flank pain.   Musculoskeletal: Negative for back pain and gait problem.   Skin: Negative for color change, rash and wound.   Neurological: Negative for dizziness, syncope, speech difficulty, weakness,  light-headedness, numbness and headaches.   Hematological: Negative for adenopathy. Does not bruise/bleed easily.   Psychiatric/Behavioral: Negative for confusion. The patient is not nervous/anxious.        Allergies:  No Known Allergies    Medications:    Current Outpatient Medications:   •  albuterol (PROAIR HFA) 108 (90 BASE) MCG/ACT inhaler, ProAir  (90 Base) MCG/ACT Inhalation Aerosol Solution; Patient Sig: ProAir  (90 Base) MCG/ACT Inhalation Aerosol Solution INHALE 1 TO 2 PUFFS EVERY 4 TO 6 HOURS AS NEEDED.; 1; 1; 24-Mar-2013; Active, Disp: , Rfl:   •  fluticasone (FLONASE) 50 MCG/ACT nasal spray, 2 sprays into the nostril(s) as directed by provider Daily., Disp: 1 bottle, Rfl: 0  •  fluticasone-salmeterol (ADVAIR DISKUS) 250-50 MCG/DOSE DISKUS, Inhale 1 puff 2 (Two) Times a Day., Disp: 60 each, Rfl: 2  •  levothyroxine (SYNTHROID, LEVOTHROID) 75 MCG tablet, TAKE 1 TABLET BY MOUTH DAILY, Disp: 90 tablet, Rfl: 3  •  loratadine (CLARITIN) 10 MG tablet, Take 1 tablet by mouth Daily., Disp: 30 tablet, Rfl: 0  •  omeprazole (priLOSEC) 40 MG capsule, Take 1 capsule by mouth Daily., Disp: 90 capsule, Rfl: 3  •  SPIRIVA RESPIMAT 2.5 MCG/ACT aerosol solution, , Disp: , Rfl: 1    History:  Past Medical History:   Diagnosis Date   • Abdominal pain, RUQ    • Acute bronchitis    • Atypical mole    • Cough    • Diarrhea    • Edema    • Elevated blood pressure reading without diagnosis of hypertension    • Esophageal reflux    • Fever    • Hematochezia    • Labyrinthitis, acute    • Low back pain    • Peptic ulcer    • Shortness of breath    • Sinusitis    • Urinary frequency    • UTI (urinary tract infection)        Past Surgical History:   Procedure Laterality Date   • BREAST CYST ASPIRATION Right     Years ago   • BREAST SURGERY Right     Fibroid removed from breast    • HYSTERECTOMY      Partial - unsure of when       Family History   Problem Relation Age of Onset   • Emphysema Mother    • Heart attack  "Father    • Prostate cancer Father    • Breast cancer Neg Hx    • Ovarian cancer Neg Hx        Social History     Tobacco Use   • Smoking status: Former Smoker   • Smokeless tobacco: Never Used   Substance Use Topics   • Alcohol use: No   • Drug use: Not on file        OBJECTIVE:    Vital Signs:   Vitals:    06/02/20 1444   BP: 120/70   Pulse: 70   Temp: 99.3 °F (37.4 °C)   SpO2: 95%   Weight: 66.6 kg (146 lb 12.8 oz)   Height: 152.4 cm (60\")       Physical Exam:   General Appearance:    Alert, cooperative, in no acute distress   Head:    Normocephalic, without obvious abnormality, atraumatic   Eyes:            Lids and lashes normal, conjunctivae and sclerae normal, no   icterus, no pallor, corneas clear, PERRLA   Ears:    Ears appear intact with no abnormalities noted   Throat:   No oral lesions, no thrush, oral mucosa moist   Neck:   No adenopathy, supple, trachea midline, no thyromegaly, no   carotid bruit, no JVD   Lungs:     Clear to auscultation,respirations regular, even and                  unlabored    Heart:    Regular rhythm and normal rate, normal S1 and S2, no            murmur, no gallop, no rub, no click   Chest Wall:    No abnormalities observed   Abdomen:     Normal bowel sounds, no masses, no organomegaly, soft        non-tender, non-distended, no guarding, no rebound                tenderness, no evidence of peritoneal signs   Extremities:   Moves all extremities well, no edema, no cyanosis, no             redness   Pulses:   Pulses palpable and equal bilaterally   Skin:   No bleeding, bruising or rash   Lymph nodes:   No palpable adenopathy   Neurologic:   Cranial nerves 2 - 12 grossly intact, sensation intact, DTR       present and equal bilaterally     Results Review:   I reviewed the patient's new clinical results.  I reviewed the patient's new imaging results and agree with the interpretation.  I reviewed the patient's other test results and agree with the interpretation    Review of Systems " was reviewed and confirmed as accurate as documented by the MA.    ASSESSMENT/PLAN:    1. Pain of upper abdomen        I did have a detailed discussion with the patient in the office today and her gallbladder ultrasound was negative, work on a get a HIDA scan on the patient and then I will see her back in the office in follow-up.    I discussed the patients findings and my recommendations with patient        Electronically signed by Catalino Cuevas MD  06/03/20      Portions of this note has been scribed for Catalino Cuevas MD by Sharmaine Caruso. 6/3/2020  13:00

## 2020-06-04 ENCOUNTER — OFFICE VISIT (OUTPATIENT)
Dept: PULMONOLOGY | Facility: CLINIC | Age: 65
End: 2020-06-04

## 2020-06-04 VITALS
SYSTOLIC BLOOD PRESSURE: 118 MMHG | RESPIRATION RATE: 18 BRPM | TEMPERATURE: 97.8 F | HEIGHT: 60 IN | DIASTOLIC BLOOD PRESSURE: 70 MMHG | HEART RATE: 66 BPM | WEIGHT: 147 LBS | OXYGEN SATURATION: 97 % | BODY MASS INDEX: 28.86 KG/M2

## 2020-06-04 DIAGNOSIS — R06.02 SHORTNESS OF BREATH: Primary | ICD-10-CM

## 2020-06-04 DIAGNOSIS — Z87.891 PERSONAL HISTORY OF TOBACCO USE, PRESENTING HAZARDS TO HEALTH: ICD-10-CM

## 2020-06-04 DIAGNOSIS — J44.9 CHRONIC OBSTRUCTIVE PULMONARY DISEASE, UNSPECIFIED COPD TYPE (HCC): ICD-10-CM

## 2020-06-04 DIAGNOSIS — J30.89 OTHER ALLERGIC RHINITIS: ICD-10-CM

## 2020-06-04 PROCEDURE — 99245 OFF/OP CONSLTJ NEW/EST HI 55: CPT | Performed by: INTERNAL MEDICINE

## 2020-06-04 RX ORDER — AZELASTINE 1 MG/ML
1 SPRAY, METERED NASAL 2 TIMES DAILY PRN
Qty: 1 EACH | Refills: 5 | Status: SHIPPED | OUTPATIENT
Start: 2020-06-04 | End: 2020-07-31

## 2020-06-04 RX ORDER — ALBUTEROL SULFATE 90 UG/1
2 AEROSOL, METERED RESPIRATORY (INHALATION) EVERY 6 HOURS PRN
Qty: 1 INHALER | Refills: 5 | Status: SHIPPED | OUTPATIENT
Start: 2020-06-04

## 2020-06-04 RX ORDER — TIOTROPIUM BROMIDE INHALATION SPRAY 3.12 UG/1
2 SPRAY, METERED RESPIRATORY (INHALATION)
Qty: 1 INHALER | Refills: 6 | Status: SHIPPED | OUTPATIENT
Start: 2020-06-04 | End: 2021-02-08

## 2020-06-04 RX ORDER — FLUNISOLIDE 0.25 MG/ML
1 SOLUTION NASAL EVERY 12 HOURS
Qty: 1 BOTTLE | Refills: 5 | Status: SHIPPED | OUTPATIENT
Start: 2020-06-04 | End: 2020-06-08 | Stop reason: ALTCHOICE

## 2020-06-04 NOTE — PROGRESS NOTES
CONSULT NOTE    Requested by:   Vermeesch, Marilyn K, MD      Chief Complaint   Patient presents with   • Consult   • Breathing Problem       Subjective:  Carola Resendiz is a 64 y.o. female.     History of Present Illness   Patient comes in today for evaluation of shortness of breath. Patient says that for the past few years, she has been noticing that her exercise tolerance has been declining. The patient has had days when walking any significant distance is difficult to do without stopping in the middle, to catch her breath.     Patient also complains of some cough and phlegm production that occurs on most mornings out of the week, for most weeks out of the month, for the past few years. Patient used to smoke 1.5 pack per day for the past 25-26 years and quit smoking 6 years ago.     she mentions a few recent prescriptions for antibiotics for sinus infections.     she does not have a family history of chronic obstructive disease. she works in a metal (aluminium/steel) factory and uses coolant that occasionally makes it hard for her to breath.    Patient also complains of runny nose and dribbling in the back of the throat for the past few weeks. This has been sometimes associated with seasonal variation.      The following portions of the patient's history were reviewed and updated as appropriate: allergies, current medications, past family history, past medical history, past social history and past surgical history.    Review of Systems   Constitutional: Positive for chills. Negative for fatigue and fever.   HENT: Positive for sinus pressure. Negative for sneezing and sore throat.    Respiratory: Positive for chest tightness and wheezing. Negative for cough and shortness of breath.    Cardiovascular: Positive for leg swelling. Negative for palpitations.   Psychiatric/Behavioral: Positive for sleep disturbance.   All other systems reviewed and are negative.      Past Medical History:   Diagnosis Date   •  "Abdominal pain, RUQ    • Acute bronchitis    • Atypical mole    • Cough    • Diarrhea    • Edema    • Elevated blood pressure reading without diagnosis of hypertension    • Esophageal reflux    • Fever    • Hematochezia    • Labyrinthitis, acute    • Low back pain    • Peptic ulcer    • Shortness of breath    • Sinusitis    • Urinary frequency    • UTI (urinary tract infection)        Social History     Tobacco Use   • Smoking status: Former Smoker   • Smokeless tobacco: Never Used   Substance Use Topics   • Alcohol use: No         Objective:  Visit Vitals  /70   Pulse 66   Temp 97.8 °F (36.6 °C)   Resp 18   Ht 152.4 cm (60\")   Wt 66.7 kg (147 lb)   SpO2 97%   BMI 28.71 kg/m²       Physical Exam   Constitutional: She is oriented to person, place, and time. She appears well-developed.   HENT:   Sinuses were non tender on palpation.  Nostril showed moderate erythema.  Oropharynx was cobblestoned.  Dentures noted.    Eyes: EOM are normal.   Neck: Neck supple. No JVD present.   Cardiovascular: Normal rate and regular rhythm.   Pulmonary/Chest: Effort normal. She has no wheezes. She has no rales.   Somewhat hyperresonant to percussion.  Decreased Air Entry Bilaterally.   Musculoskeletal:   Gait was normal.   Neurological: She is alert and oriented to person, place, and time.   Skin: Skin is warm and dry.   Psychiatric: She has a normal mood and affect. Her behavior is normal.   Vitals reviewed.      Assessment/Plan:  Carola was seen today for consult and breathing problem.    Diagnoses and all orders for this visit:    Shortness of breath  -     Pulmonary Function Test; Future    Chronic obstructive pulmonary disease, unspecified COPD type (CMS/HCC)  -     Pulmonary Function Test; Future    Personal history of tobacco use, presenting hazards to health  -     CT Chest Low Dose Wo; Future    Other allergic rhinitis    Other orders  -     flunisolide (NASALIDE) 25 MCG/ACT (0.025%) solution nasal spray; Inhale 1 spray " Every 12 (Twelve) Hours.  -     azelastine (ASTELIN) 0.1 % nasal spray; 1 spray into the nostril(s) as directed by provider 2 (Two) Times a Day As Needed for Rhinitis or Allergies. Use in each nostril as directed  -     albuterol sulfate HFA (ProAir HFA) 108 (90 Base) MCG/ACT inhaler; Inhale 2 puffs Every 6 (Six) Hours As Needed for Wheezing.  -     SPIRIVA RESPIMAT 2.5 MCG/ACT aerosol solution inhaler; Inhale 2 puffs Daily.        Return in about 4 months (around 10/4/2020) for Recheck, PFT F/U, Imaging, For Tasha, ....Also 8-9 mths w/ Dr. Lin.    DISCUSSION(if any):  Last chest x-ray was reviewed personally and the results were shared with the patient.  Images reviewed personally.   Results for orders placed during the hospital encounter of 12/23/17   XR Chest 2 View    Narrative PROCEDURE: XR CHEST 2 VW-     HISTORY: Cough, fever, influenza positive today     COMPARISON: April 2, 2015.      FINDINGS: The heart is normal in size. The mediastinum is unremarkable.  The lungs are somewhat hyperinflated which may represent COPD. There is  mild fibrosis/scarring. There is no pneumothorax. The bony thorax is  intact.           Impression No acute cardiopulmonary process.     Stable appearance of the chest.        This report was finalized on 12/24/2017 8:31 AM by Tyshawn Mccormick MD.     I have also reviewed her pulmonary provider's office note that mentions COPD/chronic bronchitis and emphysema.  It also listed episode of acute bronchitis and sinus congestion and recent diagnosis of influenza?.  It appears that the patient was given Augmentin for bacterial infection from the ER, according to that note.  According to the note she had stage III COPD with chronic bronchitis and emphysema.  Upon reviewing multiple notes dating back to 2018, the patient has been on Spiriva and apparently was having issues getting low-dose CT scan approved by her insurance?    ===========================   ===========================    PFTs were reviewed. Last PFTs, from 2018, showed moderate COPD with Post BD FEV1 of 54%.    PFTs were ordered for follow up visit.     Laboratory workup was also reviewed which showed   Lab Results   Component Value Date    HGB 12.9 05/19/2020    HGB 12.4 07/19/2019    HGB 12.7 03/23/2018   ,    Lab Results   Component Value Date    EOSABS 0.37 05/19/2020    EOSABS 0.40 07/19/2019    EOSABS 0.28 03/23/2018    & Laboratory workup also showed   Lab Results   Component Value Date    CO2 28.0 05/19/2020     ===========================  ===========================    Orders as above    Based on history and physical exam, it seems that her shortness of breath is most likely from obstructive lung disease.     Patient was educated on compliance with, and the correct method of using the pulmonary medicines.     Patient will be started on nasal spray for symptoms which are definitely consistent with allergic rhinitis.     If her symptoms do not improve, then we will consider ordering IgE/RAST panel.    Side effects, of prescribed medicines, discussed.    I have told her that we will made further recommendations, based on clinical response.     The patient belongs to the risk group for which lung cancer screening has been recommended. We will try to make arrangements for the same and this will be indicated soon. This has been ordered.    Patient was given reading material, as appropriate.     I have encouraged the patient to call or schedule a visit earlier, if there are any concerns.    The patient says that she is up-to-date with pneumonia vaccinations.     The patient was reminded to stay up-to-date with influenza vaccinations.       Dictated utilizing Dragon dictation.    This document was electronically signed by Lenny Lin MD on 06/04/20 at 11:31

## 2020-06-08 ENCOUNTER — HOSPITAL ENCOUNTER (OUTPATIENT)
Dept: NUCLEAR MEDICINE | Facility: HOSPITAL | Age: 65
Discharge: HOME | End: 2020-06-08

## 2020-06-08 ENCOUNTER — LAB (OUTPATIENT)
Dept: LAB | Facility: HOSPITAL | Age: 65
End: 2020-06-08

## 2020-06-08 DIAGNOSIS — Z01.818 PRE-OP TESTING: ICD-10-CM

## 2020-06-08 DIAGNOSIS — R10.10 PAIN OF UPPER ABDOMEN: ICD-10-CM

## 2020-06-08 LAB
REF LAB TEST METHOD: NORMAL
SARS-COV-2 RNA RESP QL NAA+PROBE: NOT DETECTED

## 2020-06-08 PROCEDURE — U0004 COV-19 TEST NON-CDC HGH THRU: HCPCS

## 2020-06-08 PROCEDURE — C9803 HOPD COVID-19 SPEC COLLECT: HCPCS

## 2020-06-08 PROCEDURE — U0002 COVID-19 LAB TEST NON-CDC: HCPCS

## 2020-06-08 RX ORDER — MOMETASONE FUROATE 50 UG/1
2 SPRAY, METERED NASAL DAILY
Qty: 17 G | Refills: 5 | Status: SHIPPED | OUTPATIENT
Start: 2020-06-08 | End: 2020-07-31

## 2020-06-10 ENCOUNTER — HOSPITAL ENCOUNTER (OUTPATIENT)
Dept: NUCLEAR MEDICINE | Facility: HOSPITAL | Age: 65
Discharge: HOME | End: 2020-06-10

## 2020-06-10 PROCEDURE — 78227 HEPATOBIL SYST IMAGE W/DRUG: CPT

## 2020-06-10 PROCEDURE — 0 TECHNETIUM TC 99M MEBROFENIN KIT: Performed by: SURGERY

## 2020-06-10 PROCEDURE — A9537 TC99M MEBROFENIN: HCPCS | Performed by: SURGERY

## 2020-06-10 PROCEDURE — 25010000002 SINCALIDE PER 5 MCG: Performed by: SURGERY

## 2020-06-10 RX ORDER — KIT FOR THE PREPARATION OF TECHNETIUM TC 99M MEBROFENIN 45 MG/10ML
1 INJECTION, POWDER, LYOPHILIZED, FOR SOLUTION INTRAVENOUS
Status: COMPLETED | OUTPATIENT
Start: 2020-06-10 | End: 2020-06-10

## 2020-06-10 RX ADMIN — SINCALIDE 1.3 MCG: 5 INJECTION, POWDER, LYOPHILIZED, FOR SOLUTION INTRAVENOUS at 07:39

## 2020-06-10 RX ADMIN — MEBROFENIN 1 DOSE: 45 INJECTION, POWDER, LYOPHILIZED, FOR SOLUTION INTRAVENOUS at 06:52

## 2020-06-11 ENCOUNTER — ANESTHESIA EVENT (OUTPATIENT)
Dept: GASTROENTEROLOGY | Facility: HOSPITAL | Age: 65
End: 2020-06-11

## 2020-06-11 ENCOUNTER — ANESTHESIA (OUTPATIENT)
Dept: GASTROENTEROLOGY | Facility: HOSPITAL | Age: 65
End: 2020-06-11

## 2020-06-11 ENCOUNTER — HOSPITAL ENCOUNTER (OUTPATIENT)
Facility: HOSPITAL | Age: 65
Setting detail: HOSPITAL OUTPATIENT SURGERY
Discharge: HOME | End: 2020-06-11
Attending: SURGERY | Admitting: SURGERY

## 2020-06-11 VITALS
WEIGHT: 146 LBS | BODY MASS INDEX: 27.56 KG/M2 | DIASTOLIC BLOOD PRESSURE: 55 MMHG | SYSTOLIC BLOOD PRESSURE: 125 MMHG | HEIGHT: 61 IN | TEMPERATURE: 98.3 F | OXYGEN SATURATION: 97 % | RESPIRATION RATE: 16 BRPM | HEART RATE: 61 BPM

## 2020-06-11 DIAGNOSIS — R10.10 PAIN OF UPPER ABDOMEN: ICD-10-CM

## 2020-06-11 PROCEDURE — 25010000002 PROPOFOL 10 MG/ML EMULSION: Performed by: NURSE ANESTHETIST, CERTIFIED REGISTERED

## 2020-06-11 RX ORDER — LIDOCAINE HYDROCHLORIDE 20 MG/ML
INJECTION, SOLUTION INTRAVENOUS AS NEEDED
Status: DISCONTINUED | OUTPATIENT
Start: 2020-06-11 | End: 2020-06-11 | Stop reason: SURG

## 2020-06-11 RX ORDER — PROPOFOL 10 MG/ML
VIAL (ML) INTRAVENOUS AS NEEDED
Status: DISCONTINUED | OUTPATIENT
Start: 2020-06-11 | End: 2020-06-11 | Stop reason: SURG

## 2020-06-11 RX ORDER — SODIUM CHLORIDE 0.9 % (FLUSH) 0.9 %
10 SYRINGE (ML) INJECTION AS NEEDED
Status: DISCONTINUED | OUTPATIENT
Start: 2020-06-11 | End: 2020-06-11 | Stop reason: HOSPADM

## 2020-06-11 RX ORDER — ONDANSETRON 2 MG/ML
4 INJECTION INTRAMUSCULAR; INTRAVENOUS ONCE AS NEEDED
Status: CANCELLED | OUTPATIENT
Start: 2020-06-11 | End: 2020-06-11

## 2020-06-11 RX ORDER — SODIUM CHLORIDE, SODIUM LACTATE, POTASSIUM CHLORIDE, CALCIUM CHLORIDE 600; 310; 30; 20 MG/100ML; MG/100ML; MG/100ML; MG/100ML
1000 INJECTION, SOLUTION INTRAVENOUS CONTINUOUS
Status: DISCONTINUED | OUTPATIENT
Start: 2020-06-11 | End: 2020-06-11 | Stop reason: HOSPADM

## 2020-06-11 RX ADMIN — SODIUM CHLORIDE, POTASSIUM CHLORIDE, SODIUM LACTATE AND CALCIUM CHLORIDE: 600; 310; 30; 20 INJECTION, SOLUTION INTRAVENOUS at 07:18

## 2020-06-11 RX ADMIN — LIDOCAINE HYDROCHLORIDE 100 MG: 20 INJECTION, SOLUTION INTRAVENOUS at 07:23

## 2020-06-11 RX ADMIN — PROPOFOL 100 MG: 10 INJECTION, EMULSION INTRAVENOUS at 07:37

## 2020-06-11 NOTE — DISCHARGE INSTRUCTIONS
No pushing, pulling, tugging,  heavy lifting, or strenuous activity.  No major decision making, driving, or drinking alcoholic beverages for 24 hours. ( due to the medications you have  received)  Always use good hand hygiene/washing techniques.  NO driving while taking pain medications.

## 2020-06-11 NOTE — ANESTHESIA PREPROCEDURE EVALUATION
Anesthesia Evaluation     Patient summary reviewed and Nursing notes reviewed   no history of anesthetic complications:  NPO Solid Status: > 8 hours  NPO Liquid Status: > 8 hours           Airway   Mallampati: I  TM distance: >3 FB  Neck ROM: full  No difficulty expected  Dental - normal exam     Pulmonary - normal exam   (+) COPD moderate, shortness of breath,   Cardiovascular - negative cardio ROS and normal exam  Exercise tolerance: good (4-7 METS)        Neuro/Psych- negative ROS  GI/Hepatic/Renal/Endo    (+)  GERD, PUD,  thyroid problem hypothyroidism    Musculoskeletal     (+) back pain,   Abdominal  - normal exam    Bowel sounds: normal.   Substance History - negative use     OB/GYN negative ob/gyn ROS   (-)  Pregnant    Comment: Post menopause      Other        ROS/Med Hx Other: NKDA    Problem list:   Chronic obstructive pulmonary disease (CMS/HCC)  Gastroesophageal reflux disease without esophagitis  Hypothyroidism  Peptic ulcer  Screening mammogram, encounter for  Environmental allergies  Generalized abdominal pain  Bilateral hip pain  Pain of upper abdomen  Labyrinthitis, acute  Acute bronchitis                  Anesthesia Plan    ASA 2     MAC   (Patient advised that intravenous sedation would be utilized as primary anesthetic technique. Every effort will be made to make sure patient is comfortable. Patient advised that they may experience recall of events of the procedure. Patient verbalized understanding and agreed to plan. )  intravenous induction     Anesthetic plan, all risks, benefits, and alternatives have been provided, discussed and informed consent has been obtained with: patient.    Plan discussed with CRNA.

## 2020-06-11 NOTE — ANESTHESIA POSTPROCEDURE EVALUATION
Patient: Carola Resendiz    Procedure Summary     Date:  06/11/20 Room / Location:  [facility] ENDOSCOPY 3 / [facility] ENDOSCOPY    Anesthesia Start:  0718 Anesthesia Stop:  0737    Procedure:  ESOPHAGOGASTRODUODENOSCOPY WITH BIOPSY (N/A Esophagus) Diagnosis:       Pain of upper abdomen      (Pain of upper abdomen [R10.10])    Surgeon:  Catalino Cuevas MD Provider:  Paxton Pendleton CRNA    Anesthesia Type:  MAC ASA Status:  2          Anesthesia Type: MAC    Vitals  Vitals Value Taken Time   /55 6/11/2020  7:43 AM   Temp 98.3 °F (36.8 °C) 6/11/2020  7:43 AM   Pulse 61 6/11/2020  7:43 AM   Resp 16 6/11/2020  7:43 AM   SpO2 97 % 6/11/2020  7:43 AM           Post Anesthesia Care and Evaluation    Patient location during evaluation: PHASE II  Patient participation: complete - patient participated  Level of consciousness: awake  Pain score: 1  Pain management: adequate  Airway patency: patent  Anesthetic complications: No anesthetic complications  PONV Status: controlled  Cardiovascular status: acceptable and stable  Respiratory status: acceptable  Hydration status: acceptable

## 2020-06-16 LAB
LAB AP CASE REPORT: NORMAL
PATH REPORT.FINAL DX SPEC: NORMAL

## 2020-06-26 NOTE — PROGRESS NOTES
Patient: Carola Resendiz  YOB: 1955    Date: 06/29/2020    Primary Care Provider: Vermeesch, Marilyn K, MD    Chief Complaint   Patient presents with   • Follow-up       History: Patient is here for an evaluation and follow up from her recent EGD with biopsy. Pathology report did show chronic inactive gastritis and gastric cardia mucosa with chronic inflammation. Patient also had a HIDA scan that did show an EF of 81%. Patient complains of sharp LUQ pain that radiates into her RUQ for the past year but has worsened in the past months. She complains of associated constipation. She states symptoms worsen with fatty, greasy meals. She states since her last visit she continues to be bloated.     She has had a previous CT scan performed in May of this year that was normal.  She also has had a previous normal gallbladder ultrasound, previous HIDA scan did show evidence of an 81% ejection fraction as mentioned above.  The patient did have an a recent upper endoscopy that showed a small linear ulceration and she continues on her omeprazole.  She did have a colonoscopy performed over 3 years ago and a tubular adenoma was removed at that time.    The following portions of the patient's history were reviewed and updated as appropriate: allergies, current medications, past family history, past medical history, past social history, past surgical history and problem list.    Review of Systems   Constitutional: Negative for chills, fever and unexpected weight change.   HENT: Negative for hearing loss, trouble swallowing and voice change.    Eyes: Negative for visual disturbance.   Respiratory: Negative for apnea, cough, chest tightness, shortness of breath and wheezing.    Cardiovascular: Negative for chest pain, palpitations and leg swelling.   Gastrointestinal: Positive for abdominal pain and constipation. Negative for abdominal distention, anal bleeding, blood in stool, diarrhea, nausea, rectal pain and  "vomiting.   Endocrine: Negative for cold intolerance and heat intolerance.   Genitourinary: Negative for difficulty urinating, dysuria and flank pain.   Musculoskeletal: Negative for back pain and gait problem.   Skin: Negative for color change, rash and wound.   Neurological: Negative for dizziness, syncope, speech difficulty, weakness, light-headedness, numbness and headaches.   Hematological: Negative for adenopathy. Does not bruise/bleed easily.   Psychiatric/Behavioral: Negative for confusion. The patient is not nervous/anxious.        Vital Signs  Vitals:    06/29/20 0958   BP: 134/80   Pulse: 69   Temp: 98.2 °F (36.8 °C)   SpO2: 98%   Weight: 66.3 kg (146 lb 3.2 oz)   Height: 154.9 cm (61\")       Allergies:  No Known Allergies    Medications:    Current Outpatient Medications:   •  albuterol sulfate HFA (ProAir HFA) 108 (90 Base) MCG/ACT inhaler, Inhale 2 puffs Every 6 (Six) Hours As Needed for Wheezing., Disp: 1 inhaler, Rfl: 5  •  azelastine (ASTELIN) 0.1 % nasal spray, 1 spray into the nostril(s) as directed by provider 2 (Two) Times a Day As Needed for Rhinitis or Allergies. Use in each nostril as directed, Disp: 1 each, Rfl: 5  •  bisacodyl (Dulcolax) 5 MG EC tablet, Take 2 @ 3pm, 2 @ 7 pm day prior to colonoscopy, Disp: 4 tablet, Rfl: 0  •  levothyroxine (SYNTHROID, LEVOTHROID) 75 MCG tablet, TAKE 1 TABLET BY MOUTH DAILY, Disp: 90 tablet, Rfl: 3  •  mometasone (NASONEX) 50 MCG/ACT nasal spray, 2 sprays into the nostril(s) as directed by provider Daily., Disp: 17 g, Rfl: 5  •  omeprazole (priLOSEC) 40 MG capsule, Take 1 capsule by mouth Daily., Disp: 90 capsule, Rfl: 3  •  polyethylene glycol (GlycoLax) 17 GM/SCOOP powder, Take 238 g by mouth 1 (One) Time for 1 dose. Mix with 64 ounces of clear liquid for bowel prep., Disp: 1 each, Rfl: 1  •  SPIRIVA RESPIMAT 2.5 MCG/ACT aerosol solution inhaler, Inhale 2 puffs Daily., Disp: 1 inhaler, Rfl: 6    Physical Exam:   General Appearance:    Alert, cooperative, " in no acute distress   Head:    Normocephalic, without obvious abnormality, atraumatic   Lungs:     Clear to auscultation,respirations regular, even and                  unlabored    Heart:    Regular rhythm and normal rate, normal S1 and S2, no            murmur, no gallop, no rub, no click   Abdomen:     Normal bowel sounds, no masses, no organomegaly, soft        non-tender, non-distended, no guarding, no rebound                tenderness, no peritoneal signs   Extremities:   Moves all extremities well, no edema, no cyanosis, no             redness   Pulses:   Pulses palpable and equal bilaterally   Skin:   No bleeding, bruising or rash     Results Review:   I reviewed the patient's new clinical results.  I reviewed the patient's new imaging results and agree with the interpretation.  I reviewed the patient's other test results and agree with the interpretation     Review of Systems was reviewed and confirmed as accurate as documented by the MA.    ASSESSMENT/PLAN:    1. History of colon polyps    2. Pain of upper abdomen       In short, I did have a detailed discussion with the patient in the office today.  I reviewed all of her records with her and previous pathology report from previous colonoscopy over 3 years ago.  I think the patient would be best served by having a colonoscopy at this time and she wants this performed at the surgery center.  Risk and benefits of operative versus nonoperative intervention have been discussed with the patient, she understands and agrees, and wishes to proceed with the above-mentioned colonoscopy.    Electronically signed by Catalino Cuevas MD  06/29/20

## 2020-06-29 ENCOUNTER — OFFICE VISIT (OUTPATIENT)
Dept: SURGERY | Facility: CLINIC | Age: 65
End: 2020-06-29

## 2020-06-29 VITALS
TEMPERATURE: 98.2 F | BODY MASS INDEX: 27.6 KG/M2 | HEIGHT: 61 IN | WEIGHT: 146.2 LBS | DIASTOLIC BLOOD PRESSURE: 80 MMHG | SYSTOLIC BLOOD PRESSURE: 134 MMHG | HEART RATE: 69 BPM | OXYGEN SATURATION: 98 %

## 2020-06-29 DIAGNOSIS — Z86.010 HISTORY OF COLON POLYPS: Primary | ICD-10-CM

## 2020-06-29 DIAGNOSIS — R10.10 PAIN OF UPPER ABDOMEN: ICD-10-CM

## 2020-06-29 DIAGNOSIS — Z01.818 PRE-OP TESTING: Primary | ICD-10-CM

## 2020-06-29 PROCEDURE — 99213 OFFICE O/P EST LOW 20 MIN: CPT | Performed by: SURGERY

## 2020-06-29 RX ORDER — POLYETHYLENE GLYCOL 3350 17 G/17G
238 POWDER, FOR SOLUTION ORAL ONCE
Qty: 1 EACH | Refills: 1 | Status: SHIPPED | OUTPATIENT
Start: 2020-06-29 | End: 2020-06-29

## 2020-06-29 RX ORDER — BISACODYL 5 MG/1
TABLET, DELAYED RELEASE ORAL
Qty: 4 TABLET | Refills: 0 | OUTPATIENT
Start: 2020-06-29 | End: 2020-07-31

## 2020-07-06 ENCOUNTER — TELEPHONE (OUTPATIENT)
Dept: SURGERY | Facility: CLINIC | Age: 65
End: 2020-07-06

## 2020-07-06 ENCOUNTER — HOSPITAL ENCOUNTER (OUTPATIENT)
Dept: CT IMAGING | Facility: HOSPITAL | Age: 65
Discharge: HOME | End: 2020-07-06
Admitting: INTERNAL MEDICINE

## 2020-07-06 DIAGNOSIS — Z87.891 PERSONAL HISTORY OF TOBACCO USE, PRESENTING HAZARDS TO HEALTH: ICD-10-CM

## 2020-07-06 PROCEDURE — G0297 LDCT FOR LUNG CA SCREEN: HCPCS

## 2020-07-09 ENCOUNTER — HOSPITAL ENCOUNTER (EMERGENCY)
Facility: HOSPITAL | Age: 65
Discharge: HOME | End: 2020-07-09
Attending: EMERGENCY MEDICINE | Admitting: EMERGENCY MEDICINE

## 2020-07-09 ENCOUNTER — APPOINTMENT (OUTPATIENT)
Dept: CT IMAGING | Facility: HOSPITAL | Age: 65
End: 2020-07-09

## 2020-07-09 VITALS
RESPIRATION RATE: 18 BRPM | TEMPERATURE: 97.5 F | HEIGHT: 61 IN | BODY MASS INDEX: 27.94 KG/M2 | SYSTOLIC BLOOD PRESSURE: 167 MMHG | WEIGHT: 148 LBS | OXYGEN SATURATION: 98 % | DIASTOLIC BLOOD PRESSURE: 77 MMHG | HEART RATE: 61 BPM

## 2020-07-09 DIAGNOSIS — R10.9 ABDOMINAL PAIN, UNSPECIFIED ABDOMINAL LOCATION: Primary | ICD-10-CM

## 2020-07-09 LAB
ALBUMIN SERPL-MCNC: 4.7 G/DL (ref 3.5–5.2)
ALBUMIN/GLOB SERPL: 1.5 G/DL
ALP SERPL-CCNC: 98 U/L (ref 39–117)
ALT SERPL W P-5'-P-CCNC: 13 U/L (ref 1–33)
ANION GAP SERPL CALCULATED.3IONS-SCNC: 9.9 MMOL/L (ref 5–15)
AST SERPL-CCNC: 18 U/L (ref 1–32)
BASOPHILS # BLD AUTO: 0.05 10*3/MM3 (ref 0–0.2)
BASOPHILS NFR BLD AUTO: 0.5 % (ref 0–1.5)
BILIRUB SERPL-MCNC: 0.3 MG/DL (ref 0–1.2)
BILIRUB UR QL STRIP: NEGATIVE
BUN SERPL-MCNC: 15 MG/DL (ref 8–23)
BUN/CREAT SERPL: 20.5 (ref 7–25)
CALCIUM SPEC-SCNC: 10.2 MG/DL (ref 8.6–10.5)
CHLORIDE SERPL-SCNC: 102 MMOL/L (ref 98–107)
CLARITY UR: CLEAR
CO2 SERPL-SCNC: 29.1 MMOL/L (ref 22–29)
COLOR UR: YELLOW
CREAT SERPL-MCNC: 0.73 MG/DL (ref 0.57–1)
DEPRECATED RDW RBC AUTO: 43.3 FL (ref 37–54)
EOSINOPHIL # BLD AUTO: 0.22 10*3/MM3 (ref 0–0.4)
EOSINOPHIL NFR BLD AUTO: 2.1 % (ref 0.3–6.2)
ERYTHROCYTE [DISTWIDTH] IN BLOOD BY AUTOMATED COUNT: 12.5 % (ref 12.3–15.4)
GFR SERPL CREATININE-BSD FRML MDRD: 80 ML/MIN/1.73
GLOBULIN UR ELPH-MCNC: 3.1 GM/DL
GLUCOSE SERPL-MCNC: 122 MG/DL (ref 65–99)
GLUCOSE UR STRIP-MCNC: NEGATIVE MG/DL
HCT VFR BLD AUTO: 41.1 % (ref 34–46.6)
HGB BLD-MCNC: 13.7 G/DL (ref 12–15.9)
HGB UR QL STRIP.AUTO: NEGATIVE
IMM GRANULOCYTES # BLD AUTO: 0.03 10*3/MM3 (ref 0–0.05)
IMM GRANULOCYTES NFR BLD AUTO: 0.3 % (ref 0–0.5)
KETONES UR QL STRIP: NEGATIVE
LEUKOCYTE ESTERASE UR QL STRIP.AUTO: NEGATIVE
LIPASE SERPL-CCNC: 21 U/L (ref 13–60)
LYMPHOCYTES # BLD AUTO: 1.72 10*3/MM3 (ref 0.7–3.1)
LYMPHOCYTES NFR BLD AUTO: 16.4 % (ref 19.6–45.3)
MCH RBC QN AUTO: 31.2 PG (ref 26.6–33)
MCHC RBC AUTO-ENTMCNC: 33.3 G/DL (ref 31.5–35.7)
MCV RBC AUTO: 93.6 FL (ref 79–97)
MONOCYTES # BLD AUTO: 0.52 10*3/MM3 (ref 0.1–0.9)
MONOCYTES NFR BLD AUTO: 5 % (ref 5–12)
NEUTROPHILS NFR BLD AUTO: 7.95 10*3/MM3 (ref 1.7–7)
NEUTROPHILS NFR BLD AUTO: 75.7 % (ref 42.7–76)
NITRITE UR QL STRIP: NEGATIVE
NRBC BLD AUTO-RTO: 0 /100 WBC (ref 0–0.2)
PH UR STRIP.AUTO: 7 [PH] (ref 5–8)
PLATELET # BLD AUTO: 321 10*3/MM3 (ref 140–450)
PMV BLD AUTO: 9.8 FL (ref 6–12)
POTASSIUM SERPL-SCNC: 3.9 MMOL/L (ref 3.5–5.2)
PROT SERPL-MCNC: 7.8 G/DL (ref 6–8.5)
PROT UR QL STRIP: NEGATIVE
RBC # BLD AUTO: 4.39 10*6/MM3 (ref 3.77–5.28)
SODIUM SERPL-SCNC: 141 MMOL/L (ref 136–145)
SP GR UR STRIP: 1.01 (ref 1–1.03)
TROPONIN T SERPL-MCNC: <0.01 NG/ML (ref 0–0.03)
UROBILINOGEN UR QL STRIP: NORMAL
WBC # BLD AUTO: 10.49 10*3/MM3 (ref 3.4–10.8)

## 2020-07-09 PROCEDURE — 96374 THER/PROPH/DIAG INJ IV PUSH: CPT

## 2020-07-09 PROCEDURE — 99283 EMERGENCY DEPT VISIT LOW MDM: CPT

## 2020-07-09 PROCEDURE — 93005 ELECTROCARDIOGRAM TRACING: CPT | Performed by: EMERGENCY MEDICINE

## 2020-07-09 PROCEDURE — 84484 ASSAY OF TROPONIN QUANT: CPT | Performed by: EMERGENCY MEDICINE

## 2020-07-09 PROCEDURE — 83690 ASSAY OF LIPASE: CPT | Performed by: EMERGENCY MEDICINE

## 2020-07-09 PROCEDURE — 25010000002 IOPAMIDOL 61 % SOLUTION: Performed by: EMERGENCY MEDICINE

## 2020-07-09 PROCEDURE — 74177 CT ABD & PELVIS W/CONTRAST: CPT

## 2020-07-09 PROCEDURE — 25010000002 MORPHINE PER 10 MG: Performed by: EMERGENCY MEDICINE

## 2020-07-09 PROCEDURE — 85025 COMPLETE CBC W/AUTO DIFF WBC: CPT | Performed by: EMERGENCY MEDICINE

## 2020-07-09 PROCEDURE — 81003 URINALYSIS AUTO W/O SCOPE: CPT | Performed by: EMERGENCY MEDICINE

## 2020-07-09 PROCEDURE — 80053 COMPREHEN METABOLIC PANEL: CPT | Performed by: EMERGENCY MEDICINE

## 2020-07-09 RX ORDER — SUCRALFATE 1 G/1
1 TABLET ORAL ONCE
Status: COMPLETED | OUTPATIENT
Start: 2020-07-09 | End: 2020-07-09

## 2020-07-09 RX ORDER — SUCRALFATE 1 G/1
1 TABLET ORAL 4 TIMES DAILY
Qty: 40 TABLET | Refills: 0 | Status: SHIPPED | OUTPATIENT
Start: 2020-07-09 | End: 2020-07-24

## 2020-07-09 RX ORDER — DICYCLOMINE HYDROCHLORIDE 10 MG/ML
20 INJECTION INTRAMUSCULAR ONCE
Status: DISCONTINUED | OUTPATIENT
Start: 2020-07-09 | End: 2020-07-09 | Stop reason: HOSPADM

## 2020-07-09 RX ORDER — DICYCLOMINE HYDROCHLORIDE 10 MG/1
10 CAPSULE ORAL 3 TIMES DAILY PRN
Qty: 15 CAPSULE | Refills: 0 | OUTPATIENT
Start: 2020-07-09 | End: 2020-07-31

## 2020-07-09 RX ORDER — LIDOCAINE HYDROCHLORIDE 20 MG/ML
15 SOLUTION OROPHARYNGEAL ONCE
Status: COMPLETED | OUTPATIENT
Start: 2020-07-09 | End: 2020-07-09

## 2020-07-09 RX ORDER — ALUMINA, MAGNESIA, AND SIMETHICONE 2400; 2400; 240 MG/30ML; MG/30ML; MG/30ML
15 SUSPENSION ORAL ONCE
Status: COMPLETED | OUTPATIENT
Start: 2020-07-09 | End: 2020-07-09

## 2020-07-09 RX ORDER — FAMOTIDINE 10 MG/ML
20 INJECTION, SOLUTION INTRAVENOUS ONCE
Status: COMPLETED | OUTPATIENT
Start: 2020-07-09 | End: 2020-07-09

## 2020-07-09 RX ORDER — MORPHINE SULFATE 4 MG/ML
4 INJECTION, SOLUTION INTRAMUSCULAR; INTRAVENOUS ONCE
Status: DISCONTINUED | OUTPATIENT
Start: 2020-07-09 | End: 2020-07-09 | Stop reason: HOSPADM

## 2020-07-09 RX ADMIN — IOPAMIDOL 100 ML: 612 INJECTION, SOLUTION INTRAVENOUS at 02:32

## 2020-07-09 RX ADMIN — ALUMINUM HYDROXIDE, MAGNESIUM HYDROXIDE, AND DIMETHICONE 15 ML: 400; 400; 40 SUSPENSION ORAL at 01:26

## 2020-07-09 RX ADMIN — FAMOTIDINE 20 MG: 10 INJECTION INTRAVENOUS at 01:25

## 2020-07-09 RX ADMIN — LIDOCAINE HYDROCHLORIDE 15 ML: 20 SOLUTION ORAL; TOPICAL at 01:26

## 2020-07-09 RX ADMIN — SUCRALFATE 1 G: 1 TABLET ORAL at 05:01

## 2020-07-09 NOTE — ED PROVIDER NOTES
TRIAGE CHIEF COMPLAINT:     Nursing and triage notes reviewed    Chief Complaint   Patient presents with   • Abdominal Pain      HPI: Carola Resendiz is a 64 y.o. female who presents to the emergency department complaining of abdominal pain.  Patient states she has been having intermittent abdominal discomfort over the past several weeks.  She describes typically a burning and sharp pain in her left upper quadrant with occasional radiation to the right upper quadrant and lower abdomen.  Patient sometimes has nausea.  She had a scope by Dr. Cuevas last week and states she was diagnosed with gastritis and an ulcer.  She states she was not placed on any new medications.  She does take omeprazole at home.  She states she had food this evening and her pain worsened.  She denies fevers or chills.  No pain in her chest.  She is not short of breath.    REVIEW OF SYSTEMS: All other systems reviewed and are negative     PAST MEDICAL HISTORY:   Past Medical History:   Diagnosis Date   • Abdominal pain, RUQ    • Acute bronchitis    • Atypical mole    • COPD (chronic obstructive pulmonary disease) (CMS/[facility])    • Cough    • Diarrhea    • Disease of thyroid gland    • Edema    • Elevated blood pressure reading without diagnosis of hypertension    • Esophageal reflux    • Fever    • Hematochezia    • Labyrinthitis, acute    • Low back pain    • Peptic ulcer    • Shortness of breath    • Sinusitis    • Urinary frequency    • UTI (urinary tract infection)         FAMILY HISTORY:   Family History   Problem Relation Age of Onset   • Emphysema Mother    • Heart attack Father    • Prostate cancer Father    • Breast cancer Neg Hx    • Ovarian cancer Neg Hx         SOCIAL HISTORY:   Social History     Socioeconomic History   • Marital status:      Spouse name: Not on file   • Number of children: Not on file   • Years of education: Not on file   • Highest education level: Not on file   Tobacco Use   • Smoking status: Former Smoker    • Smokeless tobacco: Never Used   Substance and Sexual Activity   • Alcohol use: No   • Drug use: Never   • Sexual activity: Defer        SURGICAL HISTORY:   Past Surgical History:   Procedure Laterality Date   • APPENDECTOMY     • BREAST CYST ASPIRATION Right     Years ago   • BREAST SURGERY Right     Fibroid removed from breast    • ENDOSCOPY N/A 6/11/2020    Procedure: ESOPHAGOGASTRODUODENOSCOPY WITH BIOPSY;  Surgeon: Catalino Cuevas MD;  Location: [facility] ENDOSCOPY;  Service: Gastroenterology;  Laterality: N/A;   • HYSTERECTOMY      Partial - unsure of when        CURRENT MEDICATIONS:      Medication List      ASK your doctor about these medications    albuterol sulfate  (90 Base) MCG/ACT inhaler  Commonly known as:  ProAir HFA  Inhale 2 puffs Every 6 (Six) Hours As Needed for Wheezing.     azelastine 0.1 % nasal spray  Commonly known as:  ASTELIN  1 spray into the nostril(s) as directed by provider 2 (Two) Times a Day As   Needed for Rhinitis or Allergies. Use in each nostril as directed     bisacodyl 5 MG EC tablet  Commonly known as:  Dulcolax  Take 2 @ 3pm, 2 @ 7 pm day prior to colonoscopy     levothyroxine 75 MCG tablet  Commonly known as:  SYNTHROID, LEVOTHROID  TAKE 1 TABLET BY MOUTH DAILY     mometasone 50 MCG/ACT nasal spray  Commonly known as:  NASONEX  2 sprays into the nostril(s) as directed by provider Daily.     omeprazole 40 MG capsule  Commonly known as:  priLOSEC  Take 1 capsule by mouth Daily.     Spiriva Respimat 2.5 MCG/ACT aerosol solution inhaler  Generic drug:  tiotropium bromide monohydrate  Inhale 2 puffs Daily.           ALLERGIES: Patient has no known allergies.     PHYSICAL EXAM:   VITAL SIGNS:   Vitals:    07/09/20 0039   BP: (!) 188/81   Pulse: 79   Resp: 18   Temp: 97.5 °F (36.4 °C)   SpO2: 97%      CONSTITUTIONAL: Awake, oriented, appears non-toxic   HENT: Atraumatic, normocephalic, oral mucosa pink and moist, airway patent. Nares patent without drainage. External ears  normal.   EYES: Conjunctiva clear   NECK: Trachea midline   CARDIOVASCULAR: Normal heart rate, Normal rhythm, No murmurs, rubs, gallops   PULMONARY/CHEST: Clear to auscultation, no rhonchi, wheezes, or rales. Symmetrical breath sounds.  ABDOMINAL: Non-distended, soft, mild left upper quadrant tenderness- no rebound or guarding.  NEUROLOGIC: Non-focal, moving all four extremities, no gross sensory or motor deficits.   EXTREMITIES: No clubbing, cyanosis, or edema   SKIN: Warm, Dry, No erythema, No rash     ED COURSE / MEDICAL DECISION MAKING:   Carola Resendiz is a 64 y.o. female who presents to the emergency department for evaluation of abdominal pain.  Patient is nondistressed on arrival in the emergency department.  His exam does reveal some mild tenderness in the left upper quadrant.  Exam otherwise largely unremarkable.  Vital signs are stable.  Did review previous images and studies performed patient had gastritis on upper endoscopy.  Will obtain laboratory tests and treat patient symptomatically with further imaging to ensure no worsening new condition.    EKG interpreted by me reveals sinus rhythm with a rate of 74 bpm.  There are no acute ST segment or T wave changes.  This is a normal-appearing EKG.    Laboratory testing is largely unremarkable including a lipase, white blood cell count, cardiac enzymes, urinalysis, and comprehensive metabolic panel.  CT scan imaging of the abdomen and pelvis per radiology interpretation did not reveal any acute abnormalities.  Patient was given some symptomatic therapies in the emergency department with some improvement in her symptoms.  I suspect her symptoms could be related to the gastritis recently diagnosed on her endoscopy.  Patient has follow-up with Dr. Cuevas in the near future who is currently working her up for these presenting issues.  I gave patient return precautions and further symptomatic management.    DECISION TO DISCHARGE/ADMIT: see ED care timeline      FINAL IMPRESSION:   1 --abdominal pain  2 --   3 --     Electronically signed by: Dorinda Gu MD, 7/9/2020 01:03       Dorinda Gu MD  07/09/20 0433

## 2020-07-14 ENCOUNTER — TELEPHONE (OUTPATIENT)
Dept: SURGERY | Facility: CLINIC | Age: 65
End: 2020-07-14

## 2020-07-24 ENCOUNTER — OFFICE VISIT (OUTPATIENT)
Dept: INTERNAL MEDICINE | Facility: CLINIC | Age: 65
End: 2020-07-24

## 2020-07-24 VITALS
HEIGHT: 61 IN | WEIGHT: 146 LBS | SYSTOLIC BLOOD PRESSURE: 153 MMHG | BODY MASS INDEX: 27.56 KG/M2 | TEMPERATURE: 98 F | DIASTOLIC BLOOD PRESSURE: 57 MMHG | OXYGEN SATURATION: 100 % | RESPIRATION RATE: 15 BRPM | HEART RATE: 63 BPM

## 2020-07-24 DIAGNOSIS — R03.0 ELEVATED BLOOD PRESSURE READING: ICD-10-CM

## 2020-07-24 DIAGNOSIS — J01.40 ACUTE PANSINUSITIS, RECURRENCE NOT SPECIFIED: Primary | ICD-10-CM

## 2020-07-24 PROCEDURE — 99214 OFFICE O/P EST MOD 30 MIN: CPT | Performed by: NURSE PRACTITIONER

## 2020-07-24 RX ORDER — DOXYCYCLINE 100 MG/1
100 TABLET ORAL 2 TIMES DAILY
Qty: 20 TABLET | Refills: 0 | Status: SHIPPED | OUTPATIENT
Start: 2020-07-24 | End: 2020-08-03

## 2020-07-24 NOTE — PROGRESS NOTES
Chief Complaint / Reason:      Chief Complaint   Patient presents with   • Sinusitis   • Wheezing       Subjective     HPI  Patient presents today with complaints of sinus congestion and wheezing she states symptoms started on Tuesday and have progressively worsened she denies fever or chills.  Patient does see pulmonary and does use inhalers.  She states that she had tried taking over-the-counter Delsym and allergy medicine with minimal relief.  She denies chest pain, shortness of breath or heart palpitations.  Vital signs are stable with exception of elevated blood pressure.  Patient states that she is blowing thick secretions from nose that is yellowish and does have occasional cough but denies the cough being productive.  History taken from: patient    PMH/FH/Social History were reviewed and updated appropriately in the electronic medical record.   Past Medical History:   Diagnosis Date   • Abdominal pain, RUQ    • Acute bronchitis    • Atypical mole    • COPD (chronic obstructive pulmonary disease) (CMS/[facility])    • Cough    • Diarrhea    • Disease of thyroid gland    • Edema    • Elevated blood pressure reading without diagnosis of hypertension    • Esophageal reflux    • Fever    • Hematochezia    • Labyrinthitis, acute    • Low back pain    • Peptic ulcer    • Shortness of breath    • Sinusitis    • Urinary frequency    • UTI (urinary tract infection)      Past Surgical History:   Procedure Laterality Date   • APPENDECTOMY     • BREAST CYST ASPIRATION Right     Years ago   • BREAST SURGERY Right     Fibroid removed from breast    • ENDOSCOPY N/A 6/11/2020    Procedure: ESOPHAGOGASTRODUODENOSCOPY WITH BIOPSY;  Surgeon: Catalino Cuevas MD;  Location: [facility] ENDOSCOPY;  Service: Gastroenterology;  Laterality: N/A;   • HYSTERECTOMY      Partial - unsure of when     Social History     Socioeconomic History   • Marital status:      Spouse name: Not on file   • Number of children: Not on file   • Years of  education: Not on file   • Highest education level: Not on file   Tobacco Use   • Smoking status: Former Smoker   • Smokeless tobacco: Never Used   Substance and Sexual Activity   • Alcohol use: No   • Drug use: Never   • Sexual activity: Defer     Family History   Problem Relation Age of Onset   • Emphysema Mother    • Heart attack Father    • Prostate cancer Father    • Breast cancer Neg Hx    • Ovarian cancer Neg Hx        Review of Systems:   Review of Systems   Constitutional: Positive for chills and fatigue. Negative for diaphoresis, fever, unexpected weight gain and unexpected weight loss.   HENT: Positive for congestion and sinus pressure.    Respiratory: Positive for cough. Negative for choking, chest tightness and shortness of breath.    Cardiovascular: Negative.  Negative for chest pain and leg swelling.   Gastrointestinal: Negative.  Negative for nausea.   Musculoskeletal: Negative for back pain.   Allergic/Immunologic: Positive for environmental allergies. Negative for food allergies and immunocompromised state.   Neurological: Negative.    Psychiatric/Behavioral: Positive for sleep disturbance. Negative for stress.         All other systems were reviewed and are negative.  Exceptions are noted in the subjective or above.      Objective     Vital Signs  Vitals:    07/24/20 1344   BP: 153/57   Pulse: 63   Resp: 15   Temp: 98 °F (36.7 °C)   SpO2: 100%       Body mass index is 27.59 kg/m².    Physical Exam   Constitutional: She is oriented to person, place, and time. She appears well-developed and well-nourished. No distress.   HENT:   Head: Normocephalic and atraumatic.   Right Ear: External ear normal. cerumen impaction is present.  Left Ear: External ear normal. Tympanic membrane is erythematous and bulging. An impacted cerumen is not present.  Nose: Mucosal edema, rhinorrhea, sinus tenderness and congestion present. Right sinus exhibits maxillary sinus tenderness and frontal sinus tenderness. Left  sinus exhibits maxillary sinus tenderness and frontal sinus tenderness.   Mouth/Throat: Mucous membranes are dry. Posterior oropharyngeal erythema present. No oropharyngeal exudate.   PNDand unable to visualize Right TM due to cerumen impaction   Eyes: Pupils are equal, round, and reactive to light. Conjunctivae are normal. Right conjunctiva is not injected. Left conjunctiva is not injected.   Red watery eyes bilateral   Cardiovascular: Normal rate, regular rhythm, normal heart sounds and intact distal pulses.   Pulmonary/Chest: Effort normal. No respiratory distress. She has wheezes.   Lymphadenopathy:        Head (right side): No submental, no submandibular and no tonsillar adenopathy present.        Head (left side): No submental, no submandibular and no tonsillar adenopathy present.     She has no cervical adenopathy.   Neurological: She is alert and oriented to person, place, and time.   Skin: Skin is warm and dry. Capillary refill takes less than 2 seconds.   Psychiatric: She has a normal mood and affect. Her behavior is normal. Judgment and thought content normal.   Nursing note and vitals reviewed.           Results Review:    I reviewed the patient's previous clinical results.       Medication Review:     Current Outpatient Medications:   •  albuterol sulfate HFA (ProAir HFA) 108 (90 Base) MCG/ACT inhaler, Inhale 2 puffs Every 6 (Six) Hours As Needed for Wheezing., Disp: 1 inhaler, Rfl: 5  •  azelastine (ASTELIN) 0.1 % nasal spray, 1 spray into the nostril(s) as directed by provider 2 (Two) Times a Day As Needed for Rhinitis or Allergies. Use in each nostril as directed, Disp: 1 each, Rfl: 5  •  bisacodyl (Dulcolax) 5 MG EC tablet, Take 2 @ 3pm, 2 @ 7 pm day prior to colonoscopy, Disp: 4 tablet, Rfl: 0  •  dicyclomine (BENTYL) 10 MG capsule, Take 1 capsule by mouth 3 (Three) Times a Day As Needed (abdominal pain)., Disp: 15 capsule, Rfl: 0  •  levothyroxine (SYNTHROID, LEVOTHROID) 75 MCG tablet, TAKE 1  TABLET BY MOUTH DAILY, Disp: 90 tablet, Rfl: 3  •  mometasone (NASONEX) 50 MCG/ACT nasal spray, 2 sprays into the nostril(s) as directed by provider Daily., Disp: 17 g, Rfl: 5  •  omeprazole (priLOSEC) 40 MG capsule, Take 1 capsule by mouth Daily., Disp: 90 capsule, Rfl: 3  •  SPIRIVA RESPIMAT 2.5 MCG/ACT aerosol solution inhaler, Inhale 2 puffs Daily., Disp: 1 inhaler, Rfl: 6  •  doxycycline (ADOXA) 100 MG tablet, Take 1 tablet by mouth 2 (Two) Times a Day for 10 days., Disp: 20 tablet, Rfl: 0    Assessment/Plan   Carola was seen today for sinusitis and wheezing.    Diagnoses and all orders for this visit:    Acute pansinusitis, recurrence not specified  -     doxycycline (ADOXA) 100 MG tablet; Take 1 tablet by mouth 2 (Two) Times a Day for 10 days.    Elevated blood pressure reading    Initiate lifestyle modifications.   DASH Diet and exercise.    discussed ways to prevent of long-term complications from high blood pressure.  Discussed when to seek medical attention.  Encouraged patient to take blood pressure daily and keep a log.  Advised patient to contact office if blood pressure remains elevated.  Discussed medications that can increase blood pressure.        Return if symptoms worsen or fail to improve.    Kari Sterling, APRN  07/24/2020

## 2020-07-29 ENCOUNTER — LAB (OUTPATIENT)
Dept: LAB | Facility: HOSPITAL | Age: 65
End: 2020-07-29

## 2020-07-29 DIAGNOSIS — Z01.818 PRE-OP TESTING: ICD-10-CM

## 2020-07-29 LAB
REF LAB TEST METHOD: NORMAL
SARS-COV-2 RNA RESP QL NAA+PROBE: NOT DETECTED

## 2020-07-29 PROCEDURE — U0002 COVID-19 LAB TEST NON-CDC: HCPCS

## 2020-07-29 PROCEDURE — U0004 COV-19 TEST NON-CDC HGH THRU: HCPCS

## 2020-07-29 PROCEDURE — C9803 HOPD COVID-19 SPEC COLLECT: HCPCS

## 2020-07-30 ENCOUNTER — TELEPHONE (OUTPATIENT)
Dept: SURGERY | Facility: CLINIC | Age: 65
End: 2020-07-30

## 2020-07-30 DIAGNOSIS — Z01.818 PRE-OP TESTING: Primary | ICD-10-CM

## 2020-07-31 ENCOUNTER — TELEPHONE (OUTPATIENT)
Dept: PULMONOLOGY | Facility: CLINIC | Age: 65
End: 2020-07-31

## 2020-07-31 ENCOUNTER — TELEPHONE (OUTPATIENT)
Dept: INTERNAL MEDICINE | Facility: CLINIC | Age: 65
End: 2020-07-31

## 2020-07-31 NOTE — TELEPHONE ENCOUNTER
Pt called and states that she went to Urgent Care the other day and she is still not feeling any better. She was requesting an appointment with Dr. Lin but he is out of the office today. I advised her that he was not in the office today and would return on Monday. I recommended she go to Urgent Care again or to the ER if she is feeling bad and not getting any better. She verbalized understanding.

## 2020-07-31 NOTE — TELEPHONE ENCOUNTER
Patient's daughter Ambar called on behalf of the patient, requesting a call back from the provider/nurse ASAP. She stated that the patient is not feeling better and that she may be getting pneumonia. She stated her lungs are crackling and wheezing. She has been taking the antibiotic. She stated the colonoscopy that was supposed to be today canceled and rescheduled to August 7th because the doctor wants her feeling better before doing the procedure.     She would like to know what she should do, if something could be called into the pharmacy.    Preferred pharmacy:   [id number] [place] [id number] AT [facility] [address] - [phone] PH - [phone] FX     Please call Ambar and aram @[phone].

## 2020-08-05 ENCOUNTER — TELEPHONE (OUTPATIENT)
Dept: SURGERY | Facility: CLINIC | Age: 65
End: 2020-08-05

## 2020-08-13 ENCOUNTER — OFFICE VISIT (OUTPATIENT)
Dept: INTERNAL MEDICINE | Facility: CLINIC | Age: 65
End: 2020-08-13

## 2020-08-13 VITALS
HEART RATE: 77 BPM | SYSTOLIC BLOOD PRESSURE: 122 MMHG | OXYGEN SATURATION: 98 % | WEIGHT: 148 LBS | HEIGHT: 61 IN | TEMPERATURE: 98 F | BODY MASS INDEX: 27.94 KG/M2 | DIASTOLIC BLOOD PRESSURE: 62 MMHG

## 2020-08-13 DIAGNOSIS — K21.9 GASTROESOPHAGEAL REFLUX DISEASE WITHOUT ESOPHAGITIS: ICD-10-CM

## 2020-08-13 DIAGNOSIS — J43.9 PULMONARY EMPHYSEMA, UNSPECIFIED EMPHYSEMA TYPE (HCC): Primary | ICD-10-CM

## 2020-08-13 DIAGNOSIS — Z91.09 ENVIRONMENTAL ALLERGIES: ICD-10-CM

## 2020-08-13 DIAGNOSIS — E03.8 OTHER SPECIFIED HYPOTHYROIDISM: ICD-10-CM

## 2020-08-13 PROBLEM — R10.84 GENERALIZED ABDOMINAL PAIN: Status: RESOLVED | Noted: 2020-05-19 | Resolved: 2020-08-13

## 2020-08-13 PROBLEM — R10.10 PAIN OF UPPER ABDOMEN: Status: RESOLVED | Noted: 2020-06-08 | Resolved: 2020-08-13

## 2020-08-13 PROCEDURE — 99214 OFFICE O/P EST MOD 30 MIN: CPT | Performed by: INTERNAL MEDICINE

## 2020-08-13 RX ORDER — LEVOTHYROXINE SODIUM 0.07 MG/1
75 TABLET ORAL DAILY
Qty: 90 TABLET | Refills: 3 | Status: SHIPPED | OUTPATIENT
Start: 2020-08-13 | End: 2021-08-19

## 2020-08-13 RX ORDER — OMEPRAZOLE 40 MG/1
40 CAPSULE, DELAYED RELEASE ORAL DAILY
Qty: 90 CAPSULE | Refills: 3 | Status: SHIPPED | OUTPATIENT
Start: 2020-08-13 | End: 2021-08-19

## 2020-08-13 NOTE — PROGRESS NOTES
"Chief Complaint   Patient presents with   • Follow-up     med refills, hypothyroid, gerd     Subjective   Carola Resendiz is a 64 y.o. female.     Here today for follow-up of COPD, GERD, hypothyroid and allergies.  COPD/allergies- she is using her spiriva and albuterol as needed.  She has had recent bronchitis and still has a cough.  She is using her albuterol as needed  GERD- she takes her omeprazole daily on empty stomach.  She had some bad GERD a few nights ago after eating sloppy joes.  Hypothyroid- she is compliant with her thyroid med daily.  Denies any difficulty with swallow, change in voice, depression or constipation  HCM-colonoscopy 2016.  Mammogram up-to-date.  Patient had only 1 hepatitis A vaccine and it was over 12 months ago.       The following portions of the patient's history were reviewed and updated as appropriate: allergies, current medications, past family history, past medical history, past social history, past surgical history and problem list.    Review of Systems   Constitutional: Positive for fatigue. Negative for activity change, appetite change and unexpected weight change.   HENT: Negative for trouble swallowing and voice change.    Eyes: Negative for visual disturbance.   Respiratory: Positive for cough and wheezing. Negative for shortness of breath.    Cardiovascular: Negative for chest pain, palpitations and leg swelling.   Gastrointestinal: Negative for abdominal pain and constipation.        Occasional GERD symptoms   Endocrine: Negative for cold intolerance and heat intolerance.   Genitourinary: Negative for decreased urine volume and difficulty urinating.   Musculoskeletal: Positive for arthralgias.   Neurological: Negative for headaches.   Psychiatric/Behavioral: Negative for dysphoric mood and sleep disturbance. The patient is not nervous/anxious.        Objective   /62   Pulse 77   Temp 98 °F (36.7 °C)   Ht 154.9 cm (60.98\")   Wt 67.1 kg (148 lb)   SpO2 98%   BMI " 27.98 kg/m²   Body mass index is 27.98 kg/m².  Physical Exam   Constitutional: She is oriented to person, place, and time. She appears well-developed and well-nourished. No distress.   Very kind and pleasant female, she appears her stated age, wearing a mask and in no distress   HENT:   Head: Normocephalic and atraumatic.   Right Ear: External ear normal.   Left Ear: External ear normal.   Eyes: Pupils are equal, round, and reactive to light. Conjunctivae and EOM are normal.   Neck: Normal range of motion. Neck supple. No thyromegaly present.   Cardiovascular: Normal rate, regular rhythm, normal heart sounds and intact distal pulses.   No murmur heard.  No carotid bruits   Pulmonary/Chest: Effort normal. No respiratory distress. She has no wheezes.   Occasional rhonchi noted   Abdominal: Soft. Bowel sounds are normal. She exhibits no distension. There is no tenderness.   Musculoskeletal: Normal range of motion. She exhibits no edema.   Lymphadenopathy:     She has no cervical adenopathy.   Neurological: She is alert and oriented to person, place, and time. No cranial nerve deficit. Coordination normal.   Psychiatric: She has a normal mood and affect. Her behavior is normal. Judgment and thought content normal.   Nursing note and vitals reviewed.      Assessment/Plan   Carola Resendiz is here today and the following problems have been addressed:      Carola was seen today for follow-up.    Diagnoses and all orders for this visit:    Pulmonary emphysema, unspecified emphysema type (CMS/HCC)    Gastroesophageal reflux disease without esophagitis    Other specified hypothyroidism  -     TSH  -     T4, Free    Environmental allergies    Other orders  -     omeprazole (priLOSEC) 40 MG capsule; Take 1 capsule by mouth Daily.  -     levothyroxine (SYNTHROID, LEVOTHROID) 75 MCG tablet; Take 1 tablet by mouth Daily.    Continue Spiriva with as needed albuterol for shortness of breath or wheezing  Continue omeprazole, take on  empty stomach 1/2-hour before food and other medications  Continue current dose of levothyroxine, will check thyroid function tests and change dose of medication if needed  Encouraged patient to take her allergy medication regularly to help control underlying COPD    Return to clinic in 6 months for annual physical with labs    Please note that portions of this note were completed with a voice recognition program.  Efforts were made to edit dictation, but occasionally words are mistranscribed.

## 2020-08-14 LAB
T4 FREE SERPL-MCNC: 1.54 NG/DL (ref 0.93–1.7)
TSH SERPL DL<=0.005 MIU/L-ACNC: 1.27 UIU/ML (ref 0.27–4.2)

## 2020-08-14 NOTE — PROGRESS NOTES
Please tell patient her thyroid function tests are normal.  If she needs a refill on medication you may give her a 1 year refill.

## 2020-08-26 ENCOUNTER — TELEPHONE (OUTPATIENT)
Dept: PULMONOLOGY | Facility: CLINIC | Age: 65
End: 2020-08-26

## 2020-08-26 NOTE — TELEPHONE ENCOUNTER
"Pt called office stating two weeks ago she was seen at Urgent Care and was told she had bronchitis. She was prescribed an antibiotic and has since finished. Pt states she feels some better but has \"a terrible cough\" that is \"really deep\". States nothing comes up when she coughs. Pt wants to come in for an appointment to be seen soon. Please advise    Thank you!  "

## 2020-10-05 ENCOUNTER — TELEPHONE (OUTPATIENT)
Dept: PULMONOLOGY | Facility: CLINIC | Age: 65
End: 2020-10-05

## 2020-10-05 ENCOUNTER — OFFICE VISIT (OUTPATIENT)
Dept: PULMONOLOGY | Facility: CLINIC | Age: 65
End: 2020-10-05

## 2020-10-05 VITALS
SYSTOLIC BLOOD PRESSURE: 142 MMHG | BODY MASS INDEX: 28.13 KG/M2 | DIASTOLIC BLOOD PRESSURE: 92 MMHG | HEIGHT: 61 IN | TEMPERATURE: 97.1 F | OXYGEN SATURATION: 95 % | WEIGHT: 149 LBS | RESPIRATION RATE: 16 BRPM | HEART RATE: 67 BPM

## 2020-10-05 DIAGNOSIS — R06.02 SHORTNESS OF BREATH: ICD-10-CM

## 2020-10-05 DIAGNOSIS — R06.02 SHORTNESS OF BREATH: Primary | ICD-10-CM

## 2020-10-05 DIAGNOSIS — Z23 NEED FOR VACCINATION: ICD-10-CM

## 2020-10-05 DIAGNOSIS — Z87.891 PERSONAL HISTORY OF TOBACCO USE, PRESENTING HAZARDS TO HEALTH: ICD-10-CM

## 2020-10-05 DIAGNOSIS — J44.9 CHRONIC OBSTRUCTIVE PULMONARY DISEASE, UNSPECIFIED COPD TYPE (HCC): ICD-10-CM

## 2020-10-05 DIAGNOSIS — R91.1 LUNG NODULE SEEN ON IMAGING STUDY: ICD-10-CM

## 2020-10-05 DIAGNOSIS — J30.89 OTHER ALLERGIC RHINITIS: ICD-10-CM

## 2020-10-05 PROCEDURE — 90694 VACC AIIV4 NO PRSRV 0.5ML IM: CPT | Performed by: NURSE PRACTITIONER

## 2020-10-05 PROCEDURE — 94060 EVALUATION OF WHEEZING: CPT | Performed by: INTERNAL MEDICINE

## 2020-10-05 PROCEDURE — 99214 OFFICE O/P EST MOD 30 MIN: CPT | Performed by: NURSE PRACTITIONER

## 2020-10-05 PROCEDURE — 90471 IMMUNIZATION ADMIN: CPT | Performed by: NURSE PRACTITIONER

## 2020-10-05 PROCEDURE — 94726 PLETHYSMOGRAPHY LUNG VOLUMES: CPT | Performed by: INTERNAL MEDICINE

## 2020-10-05 PROCEDURE — 94729 DIFFUSING CAPACITY: CPT | Performed by: INTERNAL MEDICINE

## 2020-10-05 NOTE — PROGRESS NOTES
"Chief Complaint   Patient presents with   • Follow-up   • Shortness of Breath         Subjective   Carola Resendiz is a 64 y.o. female.     History of Present Illness   Patient comes today for follow up of shortness of breath and COPD.     She was diagnosed with bronchitis in August when she went to urgent treatment and was treated with antibiotics and steroids.  She states that it took many weeks for the cough to improve.    Patient is using medications, as prescribed. She is using Spiriva but states she coughs after using it. She was on Tudorza in the past and did not cough when she used this inhaler, however her insurance stopped covering the medications.     She uses the nebulizer every 2-3 days due to coughing which she says is due to drainage. She uses the rescue inhaler once every 1-2 days.     She uses Flonase daily.     Exercise tolerance has also remained stable.     Quit smoking 8 years ago.    The following portions of the patient's history were reviewed and updated as appropriate: allergies, current medications, past family history, past medical history, past social history and past surgical history.      Review of Systems   Constitutional: Negative for chills and fever.   HENT: Positive for rhinorrhea, sinus pressure and sneezing. Negative for sore throat.    Respiratory: Positive for cough, chest tightness and wheezing. Negative for shortness of breath.    Psychiatric/Behavioral: Positive for sleep disturbance.       Objective   Visit Vitals  /92   Pulse 67   Temp 97.1 °F (36.2 °C)   Resp 16   Ht 154.9 cm (61\")   Wt 67.6 kg (149 lb)   SpO2 95%   BMI 28.15 kg/m²       Physical Exam  Vitals signs reviewed.   HENT:      Head: Atraumatic.      Mouth/Throat:      Mouth: Mucous membranes are moist.      Pharynx: Oropharynx is clear.      Comments: Crowded oropharynx.   Neck:      Musculoskeletal: Neck supple.   Cardiovascular:      Rate and Rhythm: Normal rate and regular rhythm.   Pulmonary:      " Effort: Pulmonary effort is normal. No respiratory distress.      Comments: Somewhat decreased A/E without wheezing.  Neurological:      Mental Status: She is alert and oriented to person, place, and time.             Assessment/Plan   Carola was seen today for follow-up and shortness of breath.    Diagnoses and all orders for this visit:    Shortness of breath    Chronic obstructive pulmonary disease, unspecified COPD type (CMS/HCC)    Other allergic rhinitis    Personal history of tobacco use, presenting hazards to health    Lung nodule seen on imaging study  -     CT Chest Without Contrast; Future    Need for vaccination    Other orders  -     mometasone-formoterol (DULERA 200) 200-5 MCG/ACT inhaler; Inhale 2 puffs 2 (Two) Times a Day. Rinse mouth with water after use.  -     Fluad Quad 65+ yrs (4823-7000)           Return for keep appt in february..    DISCUSSION (if any):  I reviewed her PFT results and discussed the results with her.  The PFT reveals severe obstruction.  Mild air trapping suggested.  She has a decreased diffusion capacity.    I have asked her to continue spiriva at this time. I have added Dulera and provided a sample. The pharmacy called and stated the insurance company does not cover Dulera so she has been switched to Breo.   She should continue using the rescue medication as directed.    I have asked her to continue using Flonase daily    Compliance with medications stressed.     Side effects of prescribed medications discussed with the patient.    I reviewed her last CT scan and discussed the results with her.  The CT scan shows a 10 x 8 mm suspected area of scarring in the right lung apex.  Short-term CT follow-up is recommended.  A CT scan has been ordered during this visit.    She will be given a flu vaccine today.    She seems to be up-to-date on pneumonia vaccination but she will follow-up with her PCP to see if she has had both pneumococcal 23 and Prevnar 13.    Dictated utilizing  Ana Maria dictation.    This document was electronically signed by MITCHEL Heller October 5, 2020  10:33 EDT

## 2020-10-05 NOTE — TELEPHONE ENCOUNTER
Patient's pharmacy sent over a PA request stating that the patient's Dulera 200 is not covered by the insurance. They are suggesting this be switched to Breo, Wixela, or generic for Advair. So if you would like to switch this medication to one of these

## 2020-10-16 ENCOUNTER — HOSPITAL ENCOUNTER (OUTPATIENT)
Dept: CT IMAGING | Facility: HOSPITAL | Age: 65
Discharge: HOME | End: 2020-10-16
Admitting: NURSE PRACTITIONER

## 2020-10-16 DIAGNOSIS — R91.1 LUNG NODULE SEEN ON IMAGING STUDY: ICD-10-CM

## 2020-10-16 PROCEDURE — 71250 CT THORAX DX C-: CPT

## 2020-10-19 DIAGNOSIS — R91.1 LUNG NODULE SEEN ON IMAGING STUDY: Primary | ICD-10-CM

## 2020-10-19 NOTE — PROGRESS NOTES
Please call the patient regarding her Ct scan. The Ct has not changed from previous Ct. I will order another CT in 6-7 months to ensure stability.

## 2020-11-12 RX ORDER — LEVOTHYROXINE SODIUM 0.07 MG/1
75 TABLET ORAL DAILY
Qty: 90 TABLET | Refills: 3 | OUTPATIENT
Start: 2020-11-12

## 2021-01-20 ENCOUNTER — OFFICE VISIT (OUTPATIENT)
Dept: SURGERY | Facility: CLINIC | Age: 66
End: 2021-01-20

## 2021-01-20 VITALS
TEMPERATURE: 98.2 F | OXYGEN SATURATION: 95 % | BODY MASS INDEX: 28.13 KG/M2 | SYSTOLIC BLOOD PRESSURE: 140 MMHG | WEIGHT: 149 LBS | DIASTOLIC BLOOD PRESSURE: 82 MMHG | HEIGHT: 61 IN | HEART RATE: 84 BPM

## 2021-01-20 DIAGNOSIS — R10.30 LOWER ABDOMINAL PAIN: Primary | ICD-10-CM

## 2021-01-20 DIAGNOSIS — Z01.818 PREOP TESTING: Primary | ICD-10-CM

## 2021-01-20 PROCEDURE — 99214 OFFICE O/P EST MOD 30 MIN: CPT | Performed by: SURGERY

## 2021-01-20 RX ORDER — POLYETHYLENE GLYCOL 3350 17 G/17G
POWDER, FOR SOLUTION ORAL
Qty: 238 G | Refills: 0 | Status: SHIPPED | OUTPATIENT
Start: 2021-01-20 | End: 2021-09-17

## 2021-01-20 RX ORDER — BISACODYL 5 MG
TABLET, DELAYED RELEASE (ENTERIC COATED) ORAL
Qty: 4 TABLET | Refills: 0 | Status: SHIPPED | OUTPATIENT
Start: 2021-01-20 | End: 2021-09-17

## 2021-01-20 NOTE — PROGRESS NOTES
Patient: Carola Resendiz    YOB: 1955    Date: 01/20/2021    Primary Care Provider: Vermeesch, Marilyn K, MD    Chief Complaint   Patient presents with   • Abdominal Pain   • Nausea       SUBJECTIVE:    History of present illness:  I saw the patient in the office  today as a consultation for evaluation and treatment of abdominal pain and nausea.  Her last EGD was in 6/2020 and showed chronic inactive gastritis with intestinal metaplasia.  She complains of left side pain that has a pulling sensation.  She has trembling and dizziness especially after bowel movements.  She had a colonoscopy approximately 5 years ago.  She denies nausea, vomiting, diarrhea, or constipation.  Her ct scan on 7/9/20 was normal.    This discomfort is located in the lower quadrants, sharp in nature, after meals, associated with chronic diarrhea, nonradiating in nature.    The following portions of the patient's history were reviewed and updated as appropriate: allergies, current medications, past family history, past medical history, past social history, past surgical history and problem list.    Review of Systems   Constitutional: Negative for chills, fever and unexpected weight change.   HENT: Negative for hearing loss, trouble swallowing and voice change.    Eyes: Negative for visual disturbance.   Respiratory: Negative for apnea, cough, chest tightness, shortness of breath and wheezing.    Cardiovascular: Negative for chest pain, palpitations and leg swelling.   Gastrointestinal: Positive for abdominal pain and nausea. Negative for abdominal distention, anal bleeding, blood in stool, constipation, diarrhea, rectal pain and vomiting.   Endocrine: Negative for cold intolerance and heat intolerance.   Genitourinary: Negative for difficulty urinating, dysuria and flank pain.   Musculoskeletal: Negative for back pain and gait problem.   Skin: Negative for color change, rash and wound.   Neurological: Positive for tremors,  weakness and light-headedness. Negative for dizziness, syncope, speech difficulty, numbness and headaches.   Hematological: Negative for adenopathy. Does not bruise/bleed easily.   Psychiatric/Behavioral: Negative for confusion. The patient is not nervous/anxious.        History:  Past Medical History:   Diagnosis Date   • Abdominal pain, RUQ    • Acute bronchitis    • Atypical mole    • COPD (chronic obstructive pulmonary disease) (CMS/HCC)    • Cough    • Diarrhea    • Disease of thyroid gland    • Edema    • Elevated blood pressure reading without diagnosis of hypertension    • Esophageal reflux    • Fever    • Hematochezia    • Labyrinthitis, acute    • Low back pain    • Peptic ulcer    • Shortness of breath    • Sinusitis    • Urinary frequency    • UTI (urinary tract infection)        Past Surgical History:   Procedure Laterality Date   • APPENDECTOMY     • BREAST CYST ASPIRATION Right     Years ago   • BREAST SURGERY Right     Fibroid removed from breast    • ENDOSCOPY N/A 6/11/2020    Procedure: ESOPHAGOGASTRODUODENOSCOPY WITH BIOPSY;  Surgeon: Catalino Cuevas MD;  [facility];  Service: Gastroenterology;  Laterality: N/A;   • HYSTERECTOMY      Partial - unsure of when       Family History   Problem Relation Age of Onset   • Emphysema Mother    • Heart attack Father    • Prostate cancer Father    • Breast cancer Neg Hx    • Ovarian cancer Neg Hx        Social History     Tobacco Use   • Smoking status: Former Smoker   • Smokeless tobacco: Never Used   Substance Use Topics   • Alcohol use: No   • Drug use: Never       Allergies:  No Known Allergies    Medications:    Current Outpatient Medications:   •  albuterol sulfate HFA (ProAir HFA) 108 (90 Base) MCG/ACT inhaler, Inhale 2 puffs Every 6 (Six) Hours As Needed for Wheezing., Disp: 1 inhaler, Rfl: 5  •  bisacodyl (Dulcolax) 5 MG EC tablet, Take 4 tablets at 1:00PM with 8oz of clear liquids the day before your colonoscopy., Disp: 4 tablet,  "Rfl: 0  •  Fluticasone Furoate-Vilanterol (Breo Ellipta) 200-25 MCG/INH inhaler, Inhale 1 puff Daily. Rinse mouth with water after use., Disp: 1 each, Rfl: 5  •  ipratropium-albuterol (DUO-NEB) 0.5-2.5 mg/3 ml nebulizer, Nebulize q 4 h prn wheezing / shortness of breath, Disp: 360 mL, Rfl: 0  •  levothyroxine (SYNTHROID, LEVOTHROID) 75 MCG tablet, Take 1 tablet by mouth Daily., Disp: 90 tablet, Rfl: 3  •  omeprazole (priLOSEC) 40 MG capsule, Take 1 capsule by mouth Daily., Disp: 90 capsule, Rfl: 3  •  polyethylene glycol (MIRALAX) 17 GM/SCOOP powder, Mix 238g powder with 64oz of clear liquid at 4:00PM. Drink 8oz every 10-15 minutes until consumed., Disp: 238 g, Rfl: 0  •  SPIRIVA RESPIMAT 2.5 MCG/ACT aerosol solution inhaler, Inhale 2 puffs Daily., Disp: 1 inhaler, Rfl: 6    OBJECTIVE:    Vital Signs:   Vitals:    01/20/21 1356   BP: 140/82   Pulse: 84   Temp: 98.2 °F (36.8 °C)   TempSrc: Temporal   SpO2: 95%   Weight: 67.6 kg (149 lb)   Height: 154.9 cm (61\")       Physical Exam:   General Appearance:    Alert, cooperative, in no acute distress   Head:    Normocephalic, without obvious abnormality, atraumatic   Eyes:            Lids and lashes normal, conjunctivae and sclerae normal, no   icterus, no pallor, corneas clear, PERRLA   Ears:    Ears appear intact with no abnormalities noted   Throat:   No oral lesions, no thrush, oral mucosa moist   Neck:   No adenopathy, supple, trachea midline, no thyromegaly, no   carotid bruit, no JVD   Lungs:     Clear to auscultation,respirations regular, even and                  unlabored    Heart:    Regular rhythm and normal rate, normal S1 and S2, no            murmur, no gallop, no rub, no click   Chest Wall:    No abnormalities observed   Abdomen:     Normal bowel sounds, no masses, no organomegaly, soft        non-tender, non-distended, no guarding, there is evidence of epigastric  tenderness, no peritoneal signs   Extremities:   Moves all extremities well, no edema, no " cyanosis, no             redness   Pulses:   Pulses palpable and equal bilaterally   Skin:   No bleeding, bruising or rash   Lymph nodes:   No palpable adenopathy   Neurologic:   Cranial nerves 2 - 12 grossly intact, sensation intact     Results Review:   I reviewed the patient's new clinical results.  I reviewed the patient's new imaging results and agree with the interpretation.  I reviewed the patient's other test results and agree with the interpretation    Review of Systems was reviewed and confirmed as accurate as documented by the MA.    ASSESSMENT/PLAN:    1. Lower abdominal pain        I did have a detailed and extensive discussion with the patient in the office and they understand that they need to undergo upper endoscopy. Full risks and benefits of operative versus nonoperative intervention were discussed with the patient and these include bleeding and esophageal injury. The patient understands, agrees, and wishes to proceed with the surgical treatment plan as mentioned above. The patient had no questions for me at the end of the discussion.       I discussed the patients findings and my recommendations with patient.     Electronically signed by Catalino Cuevas MD  01/25/21 13:49 EST    Portions of this note have been scribed for Catalino Cuevas MD by Michelle Milligan. 1/25/2021  15:08 EST

## 2021-01-25 ENCOUNTER — LAB (OUTPATIENT)
Dept: LAB | Facility: HOSPITAL | Age: 66
End: 2021-01-25

## 2021-01-25 DIAGNOSIS — Z01.818 PREOP TESTING: ICD-10-CM

## 2021-01-25 DIAGNOSIS — N50.811 TESTICULAR PAIN, RIGHT: Primary | ICD-10-CM

## 2021-01-25 PROCEDURE — C9803 HOPD COVID-19 SPEC COLLECT: HCPCS

## 2021-01-25 PROCEDURE — U0004 COV-19 TEST NON-CDC HGH THRU: HCPCS

## 2021-01-26 LAB — SARS-COV-2 RNA RESP QL NAA+PROBE: NOT DETECTED

## 2021-01-28 ENCOUNTER — TELEPHONE (OUTPATIENT)
Dept: SURGERY | Facility: CLINIC | Age: 66
End: 2021-01-28

## 2021-01-28 DIAGNOSIS — Z01.818 PREOP TESTING: Primary | ICD-10-CM

## 2021-02-03 ENCOUNTER — LAB (OUTPATIENT)
Dept: LAB | Facility: HOSPITAL | Age: 66
End: 2021-02-03

## 2021-02-03 DIAGNOSIS — Z01.818 PREOP TESTING: ICD-10-CM

## 2021-02-03 LAB — SARS-COV-2 RNA RESP QL NAA+PROBE: NOT DETECTED

## 2021-02-03 PROCEDURE — U0004 COV-19 TEST NON-CDC HGH THRU: HCPCS

## 2021-02-03 PROCEDURE — C9803 HOPD COVID-19 SPEC COLLECT: HCPCS

## 2021-02-05 ENCOUNTER — OUTSIDE FACILITY SERVICE (OUTPATIENT)
Dept: SURGERY | Facility: CLINIC | Age: 66
End: 2021-02-05

## 2021-02-05 PROCEDURE — 45384 COLONOSCOPY W/LESION REMOVAL: CPT | Performed by: SURGERY

## 2021-02-08 ENCOUNTER — LAB (OUTPATIENT)
Dept: LAB | Facility: HOSPITAL | Age: 66
End: 2021-02-08

## 2021-02-08 ENCOUNTER — OFFICE VISIT (OUTPATIENT)
Dept: PULMONOLOGY | Facility: CLINIC | Age: 66
End: 2021-02-08

## 2021-02-08 VITALS
OXYGEN SATURATION: 96 % | HEART RATE: 68 BPM | BODY MASS INDEX: 27.94 KG/M2 | HEIGHT: 61 IN | WEIGHT: 148 LBS | RESPIRATION RATE: 18 BRPM | SYSTOLIC BLOOD PRESSURE: 124 MMHG | TEMPERATURE: 96.9 F | DIASTOLIC BLOOD PRESSURE: 70 MMHG

## 2021-02-08 DIAGNOSIS — R06.02 SHORTNESS OF BREATH: Primary | ICD-10-CM

## 2021-02-08 DIAGNOSIS — J44.9 ASTHMA WITH COPD (HCC): ICD-10-CM

## 2021-02-08 DIAGNOSIS — R93.89 ABNORMAL CT OF THE CHEST: ICD-10-CM

## 2021-02-08 DIAGNOSIS — Z87.891 PERSONAL HISTORY OF TOBACCO USE, PRESENTING HAZARDS TO HEALTH: ICD-10-CM

## 2021-02-08 DIAGNOSIS — J43.9 PULMONARY EMPHYSEMA, UNSPECIFIED EMPHYSEMA TYPE (HCC): ICD-10-CM

## 2021-02-08 PROCEDURE — 82104 ALPHA-1-ANTITRYPSIN PHENO: CPT

## 2021-02-08 PROCEDURE — 36415 COLL VENOUS BLD VENIPUNCTURE: CPT | Performed by: INTERNAL MEDICINE

## 2021-02-08 PROCEDURE — 86038 ANTINUCLEAR ANTIBODIES: CPT | Performed by: INTERNAL MEDICINE

## 2021-02-08 PROCEDURE — 99214 OFFICE O/P EST MOD 30 MIN: CPT | Performed by: INTERNAL MEDICINE

## 2021-02-08 PROCEDURE — 82103 ALPHA-1-ANTITRYPSIN TOTAL: CPT

## 2021-02-08 PROCEDURE — 95012 NITRIC OXIDE EXP GAS DETER: CPT | Performed by: INTERNAL MEDICINE

## 2021-02-08 NOTE — PROGRESS NOTES
"  Chief Complaint   Patient presents with   • Follow-up   • Shortness of Breath       Subjective   Carola Resendiz is a 65 y.o. female.     History of Present Illness   Patient was evaluated today for follow up of shortness of breath, abnormal CT and COPD.     Patient says that her symptoms have been somewhat worsening since the last clinic visit.     Patient does not report any emergency room visits or recent exacerbations.  The patient says that she occasionally feels chest fullness.    Patient is not using the inhalers and nebulizers, as prescribed. Exercise tolerance has also progressively worsened.     Quit smoking 7 years ago.       The following portions of the patient's history were reviewed and updated as appropriate: allergies, current medications, past family history, past medical history, past social history and past surgical history.    Review of Systems   HENT: Positive for sinus pressure. Negative for sneezing and sore throat.    Respiratory: Positive for cough and wheezing. Negative for chest tightness and shortness of breath.        Objective   Visit Vitals  /70   Pulse 68   Temp 96.9 °F (36.1 °C)   Resp 18   Ht 154.9 cm (60.98\")   Wt 67.1 kg (148 lb)   SpO2 96%   BMI 27.98 kg/m²       Physical Exam  Vitals signs reviewed.   Constitutional:       Appearance: She is well-developed.   HENT:      Head: Normocephalic and atraumatic.   Eyes:      Extraocular Movements: Extraocular movements intact.      Pupils: Pupils are equal, round, and reactive to light.   Neck:      Musculoskeletal: Neck supple.   Cardiovascular:      Rate and Rhythm: Normal rate and regular rhythm.   Pulmonary:      Effort: Pulmonary effort is normal. No respiratory distress.      Comments: Somewhat hyperresonant to percussion.  Somewhat decreased air entry.  Mild scattered wheezing noted.   Musculoskeletal:      Comments: Gait was intact.   Skin:     General: Skin is warm and dry.   Neurological:      Mental Status: She is " alert and oriented to person, place, and time.         Assessment/Plan   Diagnoses and all orders for this visit:    1. Shortness of breath (Primary)  -     Converted Six Minute Walk; Future    2. Pulmonary emphysema, unspecified emphysema type (CMS/HCC)  -     Converted Six Minute Walk; Future  -     Alpha - 1 - Antitrypsin Phenotype; Future    3. Asthma with COPD (CMS/HCC)  -     Converted Six Minute Walk; Future  -     Nitric Oxide    4. Abnormal CT of the chest    5. Personal history of tobacco use, presenting hazards to health    Other orders  -     Fluticasone-Umeclidin-Vilant (Trelegy Ellipta) 100-62.5-25 MCG/INH aerosol powder ; Inhale 1 each Daily. Rinse mouth with water after use.  Dispense: 1 each; Refill: 5           Return in about 12 weeks (around 5/3/2021) for Recheck, Imaging, 6MWT F/U, For Tasha, ....Also 8-9 mths w/ Dr. Lin.    DISCUSSION (if any):  Last CT scan was reviewed in great detail with the patient. Images reviewed personally.   Results for orders placed during the hospital encounter of 10/16/20   CT Chest Without Contrast    Narrative PROCEDURE: CT CHEST WO CONTRAST-     HISTORY: Abnormal xray - lung nodule, < 1 cm, mod-high risk;  R91.1-Solitary pulmonary nodule     Axial images were obtained through the chest without contrast. This  study was performed with techniques to keep radiation dose as low as  reasonably achievable, (ALARA).  Individualized dose reduction  techniques using automated exposure control or adjustment of mA and/or  kV according to the patient size were employed.      FINDINGS: Comparison with 07/06/2020. Exam is limited due to absence of  intravenous contrast. Heart size is normal. Scattered vascular  calcifications. Scattered calcifications, likely due to old  granulomatous disease. The phone nodes No pleural or pericardial  effusion. Small sliding-type hiatal hernia. Visualized portions of the  upper abdomen demonstrate no acute abnormality. No pneumothorax.  There  is a somewhat ill-defined groundglass opacity in the posterior aspect of  the right lung apex. This demonstrates no obvious change in size or  appearance. There are a few scattered areas of scarring which appear  unchanged. No new nodule or dominant mass identified otherwise.       Impression No significant change including ill-defined opacity in the  posterior right lung apex. Repeat CT of the chest within 6 months is  recommended to ensure longer-term stability.     This report was finalized on 10/17/2020 9:38 AM by Percy Fernandez MD.     PFTs were reviewed. Severe obstruction.     Laboratory workup was also reviewed which showed   Lab Results   Component Value Date    EOSABS 0.22 07/09/2020    EOSABS 0.37 05/19/2020    EOSABS 0.40 07/19/2019    & Laboratory workup also showed   Lab Results   Component Value Date    CO2 29.1 (H) 07/09/2020     ===========================  ===========================    FeNO level was 10 today.    Based on her symptoms it appears that she has asthma with COPD syndrome.  This may have at least some impact on her management in the future.    Due to symptoms which are suggestive of worsening & poorly controlled obstructive lung disease, I have decided to add Trelegy.    Spiriva will be discontinued.    Compliance with medications stressed.     Side effects of prescribed medications were discussed with the patient    Last CT did not show any change but she will need another CT in April to document stability.    If that CT shows no change than a further CT will need to be done in 8-12 months from that CT.     6MWT upon follow up.       Dictated utilizing Dragon dictation.    This document was electronically signed by Lenny Lin MD on 02/08/21 at 11:20 EST

## 2021-02-09 LAB — ANA SER QL: NEGATIVE

## 2021-02-14 LAB
A1AT PHENOTYP SERPL IFE: NORMAL
A1AT SERPL-MCNC: 147 MG/DL (ref 101–187)

## 2021-03-17 ENCOUNTER — OFFICE VISIT (OUTPATIENT)
Dept: INTERNAL MEDICINE | Facility: CLINIC | Age: 66
End: 2021-03-17

## 2021-03-17 VITALS
SYSTOLIC BLOOD PRESSURE: 122 MMHG | HEART RATE: 79 BPM | BODY MASS INDEX: 27.75 KG/M2 | WEIGHT: 147 LBS | OXYGEN SATURATION: 95 % | TEMPERATURE: 97.1 F | HEIGHT: 61 IN | DIASTOLIC BLOOD PRESSURE: 74 MMHG

## 2021-03-17 DIAGNOSIS — R73.9 ELEVATED BLOOD SUGAR: ICD-10-CM

## 2021-03-17 DIAGNOSIS — J43.9 PULMONARY EMPHYSEMA, UNSPECIFIED EMPHYSEMA TYPE (HCC): ICD-10-CM

## 2021-03-17 DIAGNOSIS — E03.8 OTHER SPECIFIED HYPOTHYROIDISM: ICD-10-CM

## 2021-03-17 DIAGNOSIS — K21.9 GASTROESOPHAGEAL REFLUX DISEASE WITHOUT ESOPHAGITIS: ICD-10-CM

## 2021-03-17 DIAGNOSIS — E78.2 MIXED HYPERLIPIDEMIA: ICD-10-CM

## 2021-03-17 DIAGNOSIS — E66.3 OVERWEIGHT (BMI 25.0-29.9): ICD-10-CM

## 2021-03-17 DIAGNOSIS — Z12.31 SCREENING MAMMOGRAM, ENCOUNTER FOR: ICD-10-CM

## 2021-03-17 DIAGNOSIS — Z00.00 ENCOUNTER FOR MEDICARE ANNUAL WELLNESS EXAM: Primary | ICD-10-CM

## 2021-03-17 PROCEDURE — G0402 INITIAL PREVENTIVE EXAM: HCPCS | Performed by: INTERNAL MEDICINE

## 2021-03-17 NOTE — PROGRESS NOTES
The ABCs of the Annual Wellness Visit  Subsequent Medicare Wellness Visit    Chief Complaint   Patient presents with   • Medicare Wellness-subsequent       Subjective   History of Present Illness:  Carola Resendiz is a 65 y.o. female who presents for a Subsequent Medicare Wellness Visit.  PMH of hypothyroid, GERD, COPD and allergies.  Colonoscopy completed February 2021.  Last documented mammogram 2019-she will schedule.  Flu vaccine up-to-date.  Chart documents only 1 hepatitis A vaccine.  She gets some pressure in mid upper abdomen that goes up under left ribs.  No GERD sxs.  Her weight is unchanged.  No NVDC.  Her colonoscopy was normal.  She feels like her back is hot. She uses her inhaler on and off.  Her allergies are starting to bother her and she uses zyrtec every other day.  She takes her thyroid med daily on empty stomach.  She retired and does not know what to do with herself.     HEALTH RISK ASSESSMENT    Recent Hospitalizations:  No hospitalization(s) within the last year.    Current Medical Providers:  Patient Care Team:  Vermeesch, Marilyn K, MD as PCP - General (Internal Medicine & Pediatrics)    Smoking Status:  Social History     Tobacco Use   Smoking Status Former Smoker   Smokeless Tobacco Never Used       Alcohol Consumption:  Social History     Substance and Sexual Activity   Alcohol Use No       Depression Screen:   PHQ-2/PHQ-9 Depression Screening 3/17/2021   Little interest or pleasure in doing things 0   Feeling down, depressed, or hopeless 0   Total Score 0       Fall Risk Screen:  ABELARDOADI Fall Risk Assessment was completed, and patient is at LOW risk for falls.Assessment completed on:3/17/2021    Health Habits and Functional and Cognitive Screening:  Functional & Cognitive Status 3/17/2021   Do you have difficulty preparing food and eating? No   Do you have difficulty bathing yourself, getting dressed or grooming yourself? No   Do you have difficulty using the toilet? No   Do you have  difficulty moving around from place to place? No   Do you have trouble with steps or getting out of a bed or a chair? Yes   Current Diet Limited Junk Food   Dental Exam Not up to date   Eye Exam Up to date   Exercise (times per week) 1 times per week   Do you need help using the phone?  No   Are you deaf or do you have serious difficulty hearing?  No   Do you need help with transportation? No   Do you need help shopping? No   Do you need help preparing meals?  No   Do you need help with housework?  No   Do you need help with laundry? No   Do you need help taking your medications? No   Do you need help managing money? No   Do you ever drive or ride in a car without wearing a seat belt? No   Have you felt unusual stress, anger or loneliness in the last month? No   Who do you live with? Spouse   If you need help, do you have trouble finding someone available to you? No   Do you have difficulty concentrating, remembering or making decisions? Yes         Does the patient have evidence of cognitive impairment? No    Asprin use counseling:Does not need ASA (and currently is not on it)    Age-appropriate Screening Schedule:  Refer to the list below for future screening recommendations based on patient's age, sex and/or medical conditions. Orders for these recommended tests are listed in the plan section. The patient has been provided with a written plan.    Health Maintenance   Topic Date Due   • DXA SCAN  Never done   • PAP SMEAR  Never done   • ZOSTER VACCINE (2 of 2) 06/01/2017   • LIPID PANEL  03/17/2021   • MAMMOGRAM  09/12/2021   • COLONOSCOPY  02/05/2024   • TDAP/TD VACCINES (2 - Td) 04/06/2027   • INFLUENZA VACCINE  Completed          The following portions of the patient's history were reviewed and updated as appropriate: allergies, current medications, past family history, past medical history, past social history, past surgical history and problem list.    Outpatient Medications Prior to Visit   Medication Sig  Dispense Refill   • albuterol sulfate HFA (ProAir HFA) 108 (90 Base) MCG/ACT inhaler Inhale 2 puffs Every 6 (Six) Hours As Needed for Wheezing. 1 inhaler 5   • bisacodyl (Dulcolax) 5 MG EC tablet Take 4 tablets at 1:00PM with 8oz of clear liquids the day before your colonoscopy. 4 tablet 0   • Fluticasone-Umeclidin-Vilant (Trelegy Ellipta) 100-62.5-25 MCG/INH aerosol powder  Inhale 1 each Daily. Rinse mouth with water after use. 1 each 5   • ipratropium-albuterol (DUO-NEB) 0.5-2.5 mg/3 ml nebulizer Nebulize q 4 h prn wheezing / shortness of breath 360 mL 0   • levothyroxine (SYNTHROID, LEVOTHROID) 75 MCG tablet Take 1 tablet by mouth Daily. 90 tablet 3   • omeprazole (priLOSEC) 40 MG capsule Take 1 capsule by mouth Daily. 90 capsule 3   • polyethylene glycol (MIRALAX) 17 GM/SCOOP powder Mix 238g powder with 64oz of clear liquid at 4:00PM. Drink 8oz every 10-15 minutes until consumed. 238 g 0     No facility-administered medications prior to visit.       Patient Active Problem List   Diagnosis   • Chronic obstructive pulmonary disease (CMS/HCC)   • Gastroesophageal reflux disease without esophagitis   • Hypothyroidism   • Peptic ulcer   • Screening mammogram, encounter for   • Environmental allergies   • Bilateral hip pain   • Encounter for Medicare annual wellness exam   • Mixed hyperlipidemia   • Elevated blood sugar   • Overweight (BMI 25.0-29.9)       Advanced Care Planning:  ACP discussion was held with the patient during this visit. Patient does not have an advance directive, information provided.    Review of Systems   Constitutional: Positive for diaphoresis.   HENT: Positive for tinnitus.    Eyes: Positive for itching.   Respiratory: Positive for wheezing.    Cardiovascular: Negative.    Gastrointestinal:        See HPI   Endocrine: Negative.    Genitourinary: Negative.    Musculoskeletal: Negative.    Skin: Negative.    Allergic/Immunologic: Positive for environmental allergies.   Neurological: Negative.   "  Hematological: Negative.    Psychiatric/Behavioral: Positive for sleep disturbance.       Compared to one year ago, the patient feels her physical health is the same.  Compared to one year ago, the patient feels her mental health is the same.    Reviewed chart for potential of high risk medication in the elderly: yes  Reviewed chart for potential of harmful drug interactions in the elderly:yes    Objective         Vitals:    03/17/21 0757   BP: 122/74   Pulse: 79   Temp: 97.1 °F (36.2 °C)   SpO2: 95%   Weight: 66.7 kg (147 lb)   Height: 154.9 cm (60.98\")       Body mass index is 27.79 kg/m².  Discussed the patient's BMI with her. The BMI is above average; BMI management plan is completed.    Physical Exam  Vitals and nursing note reviewed.   Constitutional:       General: She is not in acute distress.     Appearance: Normal appearance. She is well-developed. She is not ill-appearing.      Comments: Kind and pleasant female, appears her age and in no distress today   HENT:      Head: Normocephalic and atraumatic.      Right Ear: Tympanic membrane, ear canal and external ear normal.      Left Ear: Tympanic membrane, ear canal and external ear normal.      Mouth/Throat:      Comments: Dentures in place  Eyes:      General:         Right eye: No discharge.         Left eye: No discharge.      Extraocular Movements: Extraocular movements intact.      Conjunctiva/sclera: Conjunctivae normal.      Pupils: Pupils are equal, round, and reactive to light.   Neck:      Thyroid: No thyromegaly.      Comments: No thyromegaly or mass  Cardiovascular:      Rate and Rhythm: Normal rate and regular rhythm.      Pulses: Normal pulses.      Heart sounds: Normal heart sounds. No murmur heard.        Comments: No carotid bruits  Pulmonary:      Effort: Pulmonary effort is normal. No respiratory distress.      Breath sounds: Normal breath sounds. No wheezing.   Abdominal:      General: Bowel sounds are normal. There is no distension. "      Palpations: Abdomen is soft. There is no mass.      Tenderness: There is abdominal tenderness. There is no guarding or rebound.      Comments: Tenderness in mid epigastric area with no radiation, no hepatosplenomegaly or mass   Musculoskeletal:         General: Normal range of motion.      Cervical back: Normal range of motion and neck supple.      Right lower leg: No edema.      Left lower leg: No edema.   Lymphadenopathy:      Cervical: No cervical adenopathy.   Skin:     General: Skin is warm.      Findings: No rash.   Neurological:      General: No focal deficit present.      Mental Status: She is alert and oriented to person, place, and time. Mental status is at baseline.      Cranial Nerves: No cranial nerve deficit.      Motor: No weakness.      Coordination: Coordination normal.      Gait: Gait normal.   Psychiatric:         Behavior: Behavior normal.         Thought Content: Thought content normal.         Judgment: Judgment normal.      Comments: Flat affect noted               Assessment/Plan   Medicare Risks and Personalized Health Plan  CMS Preventative Services Quick Reference  Advance Directive Discussion  Breast Cancer/Mammogram Screening  Diabetic Lab Screening   Immunizations Discussed/Encouraged (specific immunizations; COVID )  Inactivity/Sedentary  Obesity/Overweight     The above risks/problems have been discussed with the patient.  Pertinent information has been shared with the patient in the After Visit Summary.  Follow up plans and orders are seen below in the Assessment/Plan Section.    Diagnoses and all orders for this visit:    1. Encounter for Medicare annual wellness exam (Primary)  -     CBC & Differential  -     Comprehensive Metabolic Panel  -     Hemoglobin A1c  -     Lipid Panel With / Chol / HDL Ratio  -     Amylase  -     Lipase  Given information on advance directives, request copy when completed  Recommend Covid vaccine, patient will sign up  Recommend pneumococcal vaccine  on next office visit in 6 months, as patient will not know when she gets Covid vaccine scheduled  Recommend diclofenac gel to right foot for intermittent arthritis pain    2. Other specified hypothyroidism  Continue current dose of levothyroxine, last TFTs were in normal range    3. Gastroesophageal reflux disease without esophagitis  -     CBC & Differential  -     Amylase  -     Lipase  -     Ambulatory Referral to Gastroenterology  Continue omeprazole, GERD is well controlled  Refer to GI for complaints of midepigastric and left upper quadrant pain that has been bothersome for nearly 6 months  Check amylase and lipase to rule out pancreas abnormality due to midepigastric pain  Recommend probiotic daily to see if this helps her current GI symptoms    4. Pulmonary emphysema, unspecified emphysema type (CMS/HCC)  Continue inhalers as needed    5. Mixed hyperlipidemia  -     Comprehensive Metabolic Panel  -     Lipid Panel With / Chol / HDL Ratio  Follow heart healthy/low cholesterol diet  Avoid processed/fried foods/fast food  Exercise as tolerated up to 30 minutes 5 days a week  Take all medications as prescribed    6. Elevated blood sugar  -     Comprehensive Metabolic Panel  -     Hemoglobin A1c  Avoid excessive amounts of sweets in diet    7. Screening mammogram, encounter for  Patient will schedule her mammogram    8. Overweight (BMI 25.0-29.9)  Patient's Body mass index is 27.79 kg/m². BMI is above normal parameters. Recommendations include: exercise counseling and nutrition counseling.      Follow Up:  Return in about 6 months (around 9/17/2021) for Next scheduled follow up.     An After Visit Summary and PPPS were given to the patient.

## 2021-03-20 LAB
ALBUMIN SERPL-MCNC: 4.4 G/DL (ref 3.5–5.2)
ALBUMIN/GLOB SERPL: 1.5 G/DL
ALP SERPL-CCNC: 99 U/L (ref 39–117)
ALT SERPL-CCNC: 16 U/L (ref 1–33)
AMYLASE SERPL-CCNC: 44 U/L (ref 28–100)
AST SERPL-CCNC: 20 U/L (ref 1–32)
BASOPHILS # BLD AUTO: 0.03 10*3/MM3 (ref 0–0.2)
BASOPHILS NFR BLD AUTO: 0.4 % (ref 0–1.5)
BILIRUB SERPL-MCNC: 0.4 MG/DL (ref 0–1.2)
BUN SERPL-MCNC: 16 MG/DL (ref 8–23)
BUN/CREAT SERPL: 22.9 (ref 7–25)
CALCIUM SERPL-MCNC: 9.5 MG/DL (ref 8.6–10.5)
CHLORIDE SERPL-SCNC: 101 MMOL/L (ref 98–107)
CHOLEST SERPL-MCNC: 240 MG/DL (ref 0–200)
CHOLEST/HDLC SERPL: 2.86 {RATIO}
CO2 SERPL-SCNC: 28.5 MMOL/L (ref 22–29)
CREAT SERPL-MCNC: 0.7 MG/DL (ref 0.57–1)
EOSINOPHIL # BLD AUTO: 0.32 10*3/MM3 (ref 0–0.4)
EOSINOPHIL NFR BLD AUTO: 4.5 % (ref 0.3–6.2)
ERYTHROCYTE [DISTWIDTH] IN BLOOD BY AUTOMATED COUNT: 12 % (ref 12.3–15.4)
GLOBULIN SER CALC-MCNC: 2.9 GM/DL
GLUCOSE SERPL-MCNC: 98 MG/DL (ref 65–99)
HBA1C MFR BLD: 5.5 % (ref 4.8–5.6)
HCT VFR BLD AUTO: 39.5 % (ref 34–46.6)
HDLC SERPL-MCNC: 84 MG/DL (ref 40–60)
HGB BLD-MCNC: 13.5 G/DL (ref 12–15.9)
IMM GRANULOCYTES # BLD AUTO: 0.02 10*3/MM3 (ref 0–0.05)
IMM GRANULOCYTES NFR BLD AUTO: 0.3 % (ref 0–0.5)
LDLC SERPL CALC-MCNC: 140 MG/DL (ref 0–100)
LIPASE SERPL-CCNC: 23 U/L (ref 13–60)
LYMPHOCYTES # BLD AUTO: 1.75 10*3/MM3 (ref 0.7–3.1)
LYMPHOCYTES NFR BLD AUTO: 24.3 % (ref 19.6–45.3)
MCH RBC QN AUTO: 30.5 PG (ref 26.6–33)
MCHC RBC AUTO-ENTMCNC: 34.2 G/DL (ref 31.5–35.7)
MCV RBC AUTO: 89.4 FL (ref 79–97)
MONOCYTES # BLD AUTO: 0.59 10*3/MM3 (ref 0.1–0.9)
MONOCYTES NFR BLD AUTO: 8.2 % (ref 5–12)
NEUTROPHILS # BLD AUTO: 4.48 10*3/MM3 (ref 1.7–7)
NEUTROPHILS NFR BLD AUTO: 62.3 % (ref 42.7–76)
NRBC BLD AUTO-RTO: 0 /100 WBC (ref 0–0.2)
PLATELET # BLD AUTO: 333 10*3/MM3 (ref 140–450)
POTASSIUM SERPL-SCNC: 4.6 MMOL/L (ref 3.5–5.2)
PROT SERPL-MCNC: 7.3 G/DL (ref 6–8.5)
RBC # BLD AUTO: 4.42 10*6/MM3 (ref 3.77–5.28)
SODIUM SERPL-SCNC: 138 MMOL/L (ref 136–145)
TRIGL SERPL-MCNC: 94 MG/DL (ref 0–150)
VLDLC SERPL CALC-MCNC: 16 MG/DL (ref 5–40)
WBC # BLD AUTO: 7.19 10*3/MM3 (ref 3.4–10.8)

## 2021-04-13 ENCOUNTER — APPOINTMENT (OUTPATIENT)
Dept: CT IMAGING | Facility: HOSPITAL | Age: 66
End: 2021-04-13

## 2021-05-20 ENCOUNTER — OFFICE VISIT (OUTPATIENT)
Dept: INTERNAL MEDICINE | Facility: CLINIC | Age: 66
End: 2021-05-20

## 2021-05-20 VITALS
SYSTOLIC BLOOD PRESSURE: 149 MMHG | OXYGEN SATURATION: 97 % | RESPIRATION RATE: 15 BRPM | WEIGHT: 148 LBS | HEIGHT: 61 IN | HEART RATE: 73 BPM | TEMPERATURE: 98 F | BODY MASS INDEX: 27.94 KG/M2 | DIASTOLIC BLOOD PRESSURE: 61 MMHG

## 2021-05-20 DIAGNOSIS — J06.9 ACUTE URI: Primary | ICD-10-CM

## 2021-05-20 DIAGNOSIS — R03.0 ELEVATED BLOOD PRESSURE READING: ICD-10-CM

## 2021-05-20 PROCEDURE — 99214 OFFICE O/P EST MOD 30 MIN: CPT | Performed by: NURSE PRACTITIONER

## 2021-05-20 RX ORDER — AMOXICILLIN AND CLAVULANATE POTASSIUM 875; 125 MG/1; MG/1
1 TABLET, FILM COATED ORAL 2 TIMES DAILY
Qty: 20 TABLET | Refills: 0 | Status: SHIPPED | OUTPATIENT
Start: 2021-05-20 | End: 2021-05-30

## 2021-05-20 NOTE — PROGRESS NOTES
Chief Complaint / Reason:      Chief Complaint   Patient presents with   • Sinusitis     x 1 week   • Cough     chest feels raw when I cough       Subjective     Sinus Problem  This is a new problem. The current episode started 1 to 4 weeks ago. The problem has been gradually worsening since onset. There has been no fever. The pain is mild. Associated symptoms include congestion, coughing, headaches, sinus pressure, a sore throat and swollen glands. Pertinent negatives include no chills, ear pain or shortness of breath. Past treatments include lying down and oral decongestants. The treatment provided no relief.     Patient's blood pressure is elevated but she denies chest pain shortness of breath or heart palpitations.  Does report her toes feeling raw from coughing so much.      History taken from: patient    PMH/FH/Social History were reviewed and updated appropriately in the electronic medical record.   Past Medical History:   Diagnosis Date   • Abdominal pain, RUQ    • Acute bronchitis    • Atypical mole    • COPD (chronic obstructive pulmonary disease) (CMS/HCC)    • Cough    • Diarrhea    • Disease of thyroid gland    • Edema    • Elevated blood pressure reading without diagnosis of hypertension    • Esophageal reflux    • Fever    • Hematochezia    • Labyrinthitis, acute    • Low back pain    • Peptic ulcer    • Shortness of breath    • Sinusitis    • Urinary frequency    • UTI (urinary tract infection)      Past Surgical History:   Procedure Laterality Date   • APPENDECTOMY     • BREAST CYST ASPIRATION Right     Years ago   • BREAST SURGERY Right     Fibroid removed from breast    • ENDOSCOPY N/A 6/11/2020    Procedure: ESOPHAGOGASTRODUODENOSCOPY WITH BIOPSY;  Surgeon: Catalino Cuevas MD;  Location: [facility];  Service: Gastroenterology;  Laterality: N/A;   • HYSTERECTOMY      Partial - unsure of when     Social History     Socioeconomic History   • Marital status:      Spouse name: Not on  file   • Number of children: Not on file   • Years of education: Not on file   • Highest education level: Not on file   Tobacco Use   • Smoking status: Former Smoker   • Smokeless tobacco: Never Used   Substance and Sexual Activity   • Alcohol use: No   • Drug use: Never   • Sexual activity: Defer     Family History   Problem Relation Age of Onset   • Emphysema Mother    • Heart attack Father    • Prostate cancer Father    • Breast cancer Neg Hx    • Ovarian cancer Neg Hx        Review of Systems:   Review of Systems   Constitutional: Positive for fatigue. Negative for chills and fever.   HENT: Positive for congestion, sinus pressure, sore throat and swollen glands. Negative for ear pain and postnasal drip.    Eyes: Negative.  Negative for pain.   Respiratory: Positive for cough. Negative for shortness of breath.    Cardiovascular: Negative.    Gastrointestinal: Negative.    Allergic/Immunologic: Positive for environmental allergies.   Neurological: Positive for headache. Negative for dizziness.   Psychiatric/Behavioral: Positive for stress.         All other systems were reviewed and are negative.  Exceptions are noted in the subjective or above.      Objective     Vital Signs  Vitals:    05/20/21 1554   BP: 149/61   Pulse: 73   Resp: 15   Temp: 98 °F (36.7 °C)   SpO2: 97%       Body mass index is 27.96 kg/m².    Physical Exam  Vitals and nursing note reviewed.   Constitutional:       Appearance: She is well-developed.   HENT:      Head: Normocephalic.      Right Ear: External ear normal. Tympanic membrane is erythematous and bulging.      Left Ear: External ear normal. Tympanic membrane is erythematous and bulging.      Nose: Congestion and rhinorrhea present.      Right Sinus: Maxillary sinus tenderness and frontal sinus tenderness present.      Left Sinus: Maxillary sinus tenderness and frontal sinus tenderness present.      Mouth/Throat:      Mouth: Mucous membranes are dry.      Pharynx: Posterior  oropharyngeal erythema present.   Cardiovascular:      Rate and Rhythm: Normal rate and regular rhythm.      Heart sounds: Normal heart sounds.   Pulmonary:      Effort: Pulmonary effort is normal.      Breath sounds: Normal breath sounds.   Lymphadenopathy:      Cervical: No cervical adenopathy.   Skin:     General: Skin is warm and dry.   Neurological:      Mental Status: She is alert and oriented to person, place, and time.   Psychiatric:         Behavior: Behavior normal.         Thought Content: Thought content normal.         Judgment: Judgment normal.              Results Review:    I reviewed the patient's previous clinical results.       Medication Review:     Current Outpatient Medications:   •  albuterol sulfate HFA (ProAir HFA) 108 (90 Base) MCG/ACT inhaler, Inhale 2 puffs Every 6 (Six) Hours As Needed for Wheezing., Disp: 1 inhaler, Rfl: 5  •  bisacodyl (Dulcolax) 5 MG EC tablet, Take 4 tablets at 1:00PM with 8oz of clear liquids the day before your colonoscopy., Disp: 4 tablet, Rfl: 0  •  Fluticasone-Umeclidin-Vilant (Trelegy Ellipta) 100-62.5-25 MCG/INH aerosol powder , Inhale 1 each Daily. Rinse mouth with water after use., Disp: 1 each, Rfl: 5  •  ipratropium-albuterol (DUO-NEB) 0.5-2.5 mg/3 ml nebulizer, Nebulize q 4 h prn wheezing / shortness of breath, Disp: 360 mL, Rfl: 0  •  levothyroxine (SYNTHROID, LEVOTHROID) 75 MCG tablet, Take 1 tablet by mouth Daily., Disp: 90 tablet, Rfl: 3  •  omeprazole (priLOSEC) 40 MG capsule, Take 1 capsule by mouth Daily., Disp: 90 capsule, Rfl: 3  •  polyethylene glycol (MIRALAX) 17 GM/SCOOP powder, Mix 238g powder with 64oz of clear liquid at 4:00PM. Drink 8oz every 10-15 minutes until consumed., Disp: 238 g, Rfl: 0  •  amoxicillin-clavulanate (Augmentin) 875-125 MG per tablet, Take 1 tablet by mouth 2 (Two) Times a Day for 10 days., Disp: 20 tablet, Rfl: 0    Diagnoses and all orders for this visit:    Acute URI  -     amoxicillin-clavulanate (Augmentin) 875-125  MG per tablet; Take 1 tablet by mouth 2 (Two) Times a Day for 10 days.  Discussed nonpharmacological interventions before taking antibiotics along with worsening signs and symptoms recommend maintaining hydration.  Elevated blood pressure reading    Initiate lifestyle modifications.   DASH Diet and exercise.   discussed ways to prevent of long-term complications from high blood pressure.  Discussed when to seek medical attention.  Encouraged patient to take blood pressure daily and keep a log.  Patient to avoid medications that increase blood pressure.        Return if symptoms worsen or fail to improve.    Kari Sterling, APRN  05/20/2021

## 2021-05-26 ENCOUNTER — TELEPHONE (OUTPATIENT)
Dept: SURGERY | Facility: CLINIC | Age: 66
End: 2021-05-26

## 2021-05-26 NOTE — TELEPHONE ENCOUNTER
THE PATIENT WAS CALLED ABOUT RESCHEDULING THE APPOINTMENT FROM 02/15/2021 WITH DR SHEPPARD. THE PATIENT DID NOT WANT TO RESCHEDULE AT THIS TIME.

## 2021-08-19 RX ORDER — OMEPRAZOLE 40 MG/1
40 CAPSULE, DELAYED RELEASE ORAL DAILY
Qty: 90 CAPSULE | Refills: 3 | Status: SHIPPED | OUTPATIENT
Start: 2021-08-19 | End: 2022-03-18

## 2021-08-19 RX ORDER — LEVOTHYROXINE SODIUM 0.07 MG/1
75 TABLET ORAL DAILY
Qty: 90 TABLET | Refills: 3 | Status: SHIPPED | OUTPATIENT
Start: 2021-08-19 | End: 2022-09-01

## 2021-08-19 NOTE — TELEPHONE ENCOUNTER
Rx Refill Note  Requested Prescriptions     Pending Prescriptions Disp Refills   • levothyroxine (SYNTHROID, LEVOTHROID) 75 MCG tablet [Pharmacy Med Name: LEVOTHYROXINE 0.075MG (75MCG) TABS] 90 tablet 3     Sig: TAKE 1 TABLET BY MOUTH DAILY   • omeprazole (priLOSEC) 40 MG capsule [Pharmacy Med Name: OMEPRAZOLE 40MG CAPSULES] 90 capsule 3     Sig: TAKE 1 CAPSULE BY MOUTH DAILY      Last office visit with prescribing clinician: 3/17/2021      Next office visit with prescribing clinician: 9/17/2021            DARLENE JENSEN MA  08/19/21, 08:15 EDT     Last TSH was 8/13/2020 1.270

## 2021-09-17 ENCOUNTER — OFFICE VISIT (OUTPATIENT)
Dept: INTERNAL MEDICINE | Facility: CLINIC | Age: 66
End: 2021-09-17

## 2021-09-17 VITALS
TEMPERATURE: 97 F | OXYGEN SATURATION: 97 % | SYSTOLIC BLOOD PRESSURE: 122 MMHG | DIASTOLIC BLOOD PRESSURE: 70 MMHG | HEIGHT: 61 IN | WEIGHT: 150 LBS | HEART RATE: 77 BPM | BODY MASS INDEX: 28.32 KG/M2

## 2021-09-17 DIAGNOSIS — Z12.31 SCREENING MAMMOGRAM, ENCOUNTER FOR: ICD-10-CM

## 2021-09-17 DIAGNOSIS — K21.9 GASTROESOPHAGEAL REFLUX DISEASE WITHOUT ESOPHAGITIS: ICD-10-CM

## 2021-09-17 DIAGNOSIS — J43.9 PULMONARY EMPHYSEMA, UNSPECIFIED EMPHYSEMA TYPE (HCC): ICD-10-CM

## 2021-09-17 DIAGNOSIS — E03.8 OTHER SPECIFIED HYPOTHYROIDISM: ICD-10-CM

## 2021-09-17 DIAGNOSIS — E78.2 MIXED HYPERLIPIDEMIA: Primary | ICD-10-CM

## 2021-09-17 PROCEDURE — 99214 OFFICE O/P EST MOD 30 MIN: CPT | Performed by: INTERNAL MEDICINE

## 2021-09-17 NOTE — PROGRESS NOTES
"Chief Complaint   Patient presents with   • Follow-up     for GERD, HLD, and hypothyroidism.      Subjective   Carola Resendiz is a 65 y.o. female.     Here today for follow-up of COPD, GERD, hypothyroid and allergies.  COPD/allergies- she is using her Trelegy inhaler 3-4 times a week and albuterol as needed.  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  She was supposed to have a follow-up CT scan in February of this year however it was not done. She has had some wheezing with weather changes recently due to allergies  GERD- she takes her omeprazole daily on empty stomach.  She does have some GERD at times   Hypothyroid- she is compliant with her thyroid med daily.  Denies any difficulty with swallow, change in voice, depression.  She does have some constipation  HCM-colonoscopy 2021.  Mammogram 2019.  Had a COVID vaccine.          The following portions of the patient's history were reviewed and updated as appropriate: allergies, current medications, past family history, past medical history, past social history, past surgical history and problem list.    Review of Systems   Constitutional: Negative for activity change, appetite change and unexpected weight change.   HENT: Negative for trouble swallowing and voice change.    Eyes: Negative for visual disturbance.   Respiratory: Positive for wheezing. Negative for shortness of breath.    Cardiovascular: Negative for chest pain, palpitations and leg swelling.   Gastrointestinal: Positive for constipation. Negative for abdominal pain.        Occasional GERD symptoms   Endocrine: Negative for cold intolerance and heat intolerance.   Genitourinary: Negative for hematuria.   Musculoskeletal: Negative for back pain.   Neurological: Negative for headaches.   Psychiatric/Behavioral: Negative for dysphoric mood and sleep disturbance.       Objective   /70   Pulse 77   Temp 97 °F (36.1 °C)   Ht 154.9 cm (61\")   Wt 68 kg (150 lb)   " SpO2 97%   BMI 28.34 kg/m²   Body mass index is 28.34 kg/m².  Physical Exam  Vitals and nursing note reviewed.   Constitutional:       General: She is not in acute distress.     Appearance: Normal appearance. She is well-developed. She is not ill-appearing.      Comments: Kind and pleasant female, appears stated age and in NAD today   HENT:      Head: Normocephalic and atraumatic.      Right Ear: External ear normal.      Left Ear: External ear normal.   Eyes:      General:         Right eye: No discharge.         Left eye: No discharge.      Extraocular Movements: Extraocular movements intact.      Conjunctiva/sclera: Conjunctivae normal.      Pupils: Pupils are equal, round, and reactive to light.   Neck:      Thyroid: No thyromegaly.      Vascular: No carotid bruit.      Comments: No thyromegaly or mass  Cardiovascular:      Rate and Rhythm: Normal rate and regular rhythm.      Pulses: Normal pulses.      Heart sounds: Normal heart sounds. No murmur heard.     Pulmonary:      Effort: Pulmonary effort is normal. No respiratory distress.      Breath sounds: Normal breath sounds. No wheezing.   Abdominal:      General: Bowel sounds are normal. There is no distension.      Palpations: Abdomen is soft.      Tenderness: There is no abdominal tenderness.   Musculoskeletal:         General: Normal range of motion.      Cervical back: Normal range of motion and neck supple.      Right lower leg: No edema.      Left lower leg: No edema.   Lymphadenopathy:      Cervical: No cervical adenopathy.   Skin:     General: Skin is warm.      Findings: No rash.   Neurological:      General: No focal deficit present.      Mental Status: She is alert and oriented to person, place, and time. Mental status is at baseline.      Cranial Nerves: No cranial nerve deficit.      Motor: No weakness.      Coordination: Coordination normal.      Gait: Gait normal.   Psychiatric:         Mood and Affect: Mood normal.         Behavior: Behavior  normal.         Thought Content: Thought content normal.         Judgment: Judgment normal.         Assessment/Plan   Carola Resendiz is here today and the following problems have been addressed:      Diagnoses and all orders for this visit:    1. Mixed hyperlipidemia (Primary)    2. Other specified hypothyroidism    3. Gastroesophageal reflux disease without esophagitis    4. Screening mammogram, encounter for  -     Mammo Screening Digital Tomosynthesis Bilateral With CAD; Future    5. Pulmonary emphysema, unspecified emphysema type (CMS/HCC)  -     CT Chest Without Contrast; Future    Follow heart healthy/low cholesterol diet  Avoid processed/fried foods/fast food  Exercise as tolerated up to 30 minutes 5 days a week  Take all medications as prescribed  Continue current dose of levothyroxine  GERD is fairly well controlled with omeprazole unless patient overeats or eats spicy foods  Mammogram ordered  We will check on CT scan that was ordered last year per pulmonary, if necessary I will order repeat CT scan for patient    Return to clinic in 6 months for Medicare wellness exam    Please note that portions of this note were completed with a voice recognition program.  Efforts were made to edit dictation, but occasionally words are mistranscribed.

## 2021-11-05 ENCOUNTER — HOSPITAL ENCOUNTER (OUTPATIENT)
Dept: CT IMAGING | Facility: HOSPITAL | Age: 66
Discharge: HOME | End: 2021-11-05

## 2021-11-05 ENCOUNTER — HOSPITAL ENCOUNTER (OUTPATIENT)
Dept: MAMMOGRAPHY | Facility: HOSPITAL | Age: 66
Discharge: HOME | End: 2021-11-05

## 2021-11-05 DIAGNOSIS — Z12.31 SCREENING MAMMOGRAM, ENCOUNTER FOR: ICD-10-CM

## 2021-11-05 DIAGNOSIS — J43.9 PULMONARY EMPHYSEMA, UNSPECIFIED EMPHYSEMA TYPE (HCC): ICD-10-CM

## 2021-11-05 DIAGNOSIS — R91.8 LUNG NODULES: Primary | ICD-10-CM

## 2021-11-05 PROCEDURE — 77067 SCR MAMMO BI INCL CAD: CPT

## 2021-11-05 PROCEDURE — 77063 BREAST TOMOSYNTHESIS BI: CPT

## 2021-11-05 PROCEDURE — 71250 CT THORAX DX C-: CPT

## 2021-11-05 NOTE — PROGRESS NOTES
Please call patient and tell her that lung nodule in the right upper lung is still approximately 12 mm in size but is partially solid per radiologist and this is a change compared to last CT scan.  He would like follow-up CT scan in 6 months, she will be contacted with this appointment time in May.

## 2021-11-12 ENCOUNTER — TELEPHONE (OUTPATIENT)
Dept: INTERNAL MEDICINE | Facility: CLINIC | Age: 66
End: 2021-11-12

## 2021-11-22 ENCOUNTER — OFFICE VISIT (OUTPATIENT)
Dept: PULMONOLOGY | Facility: CLINIC | Age: 66
End: 2021-11-22

## 2021-11-22 VITALS
DIASTOLIC BLOOD PRESSURE: 62 MMHG | HEART RATE: 64 BPM | SYSTOLIC BLOOD PRESSURE: 128 MMHG | WEIGHT: 150.8 LBS | BODY MASS INDEX: 28.47 KG/M2 | HEIGHT: 61 IN | OXYGEN SATURATION: 97 %

## 2021-11-22 DIAGNOSIS — R06.02 SHORTNESS OF BREATH: Primary | ICD-10-CM

## 2021-11-22 DIAGNOSIS — R91.1 LUNG NODULE SEEN ON IMAGING STUDY: ICD-10-CM

## 2021-11-22 DIAGNOSIS — R93.89 ABNORMAL CT OF THE CHEST: ICD-10-CM

## 2021-11-22 DIAGNOSIS — J44.9 ASTHMA WITH COPD (HCC): ICD-10-CM

## 2021-11-22 PROCEDURE — 94618 PULMONARY STRESS TESTING: CPT | Performed by: NURSE PRACTITIONER

## 2021-11-22 PROCEDURE — 99214 OFFICE O/P EST MOD 30 MIN: CPT | Performed by: NURSE PRACTITIONER

## 2021-11-22 RX ORDER — PREDNISONE 20 MG/1
TABLET ORAL
Qty: 10 TABLET | Refills: 0 | Status: SHIPPED | OUTPATIENT
Start: 2021-11-22 | End: 2022-03-09

## 2022-03-09 ENCOUNTER — OFFICE VISIT (OUTPATIENT)
Dept: INTERNAL MEDICINE | Facility: CLINIC | Age: 67
End: 2022-03-09

## 2022-03-09 VITALS
DIASTOLIC BLOOD PRESSURE: 77 MMHG | TEMPERATURE: 97.8 F | BODY MASS INDEX: 28.89 KG/M2 | SYSTOLIC BLOOD PRESSURE: 168 MMHG | OXYGEN SATURATION: 99 % | RESPIRATION RATE: 15 BRPM | HEIGHT: 61 IN | WEIGHT: 153 LBS | HEART RATE: 80 BPM

## 2022-03-09 DIAGNOSIS — R03.0 ELEVATED BLOOD PRESSURE READING: ICD-10-CM

## 2022-03-09 DIAGNOSIS — R30.0 DYSURIA: Primary | ICD-10-CM

## 2022-03-09 LAB
BILIRUB BLD-MCNC: NEGATIVE MG/DL
CLARITY, POC: ABNORMAL
COLOR UR: YELLOW
EXPIRATION DATE: ABNORMAL
GLUCOSE UR STRIP-MCNC: NEGATIVE MG/DL
KETONES UR QL: NEGATIVE
LEUKOCYTE EST, POC: ABNORMAL
Lab: ABNORMAL
NITRITE UR-MCNC: NEGATIVE MG/ML
PH UR: 6 [PH] (ref 5–8)
PROT UR STRIP-MCNC: NEGATIVE MG/DL
RBC # UR STRIP: ABNORMAL /UL
SP GR UR: 1.01 (ref 1–1.03)
UROBILINOGEN UR QL: NORMAL

## 2022-03-09 PROCEDURE — 81003 URINALYSIS AUTO W/O SCOPE: CPT | Performed by: NURSE PRACTITIONER

## 2022-03-09 PROCEDURE — 99213 OFFICE O/P EST LOW 20 MIN: CPT | Performed by: NURSE PRACTITIONER

## 2022-03-09 RX ORDER — NITROFURANTOIN 25; 75 MG/1; MG/1
100 CAPSULE ORAL 2 TIMES DAILY
Qty: 14 CAPSULE | Refills: 0 | Status: SHIPPED | OUTPATIENT
Start: 2022-03-09 | End: 2022-03-16

## 2022-03-09 NOTE — PROGRESS NOTES
Chief Complaint / Reason:      Chief Complaint   Patient presents with   • Urinary Tract Infection       Subjective     HPI  Patient presents today with complaints of UTI symptoms and states symptoms started Monday night but she denies fever chills or blood in urine.  Denies any flank pain.  Blood pressure is elevated but she denies chest pain, shortness of breath or heart palpitations.  History taken from: patient    PMH/FH/Social History were reviewed and updated appropriately in the electronic medical record.   Past Medical History:   Diagnosis Date   • Abdominal pain, RUQ    • Acute bronchitis    • Atypical mole    • COPD (chronic obstructive pulmonary disease) (HCC)    • Cough    • Diarrhea    • Disease of thyroid gland    • Edema    • Elevated blood pressure reading without diagnosis of hypertension    • Esophageal reflux    • Fever    • Hematochezia    • Labyrinthitis, acute    • Low back pain    • Peptic ulcer    • Shortness of breath    • Sinusitis    • Urinary frequency    • UTI (urinary tract infection)      Past Surgical History:   Procedure Laterality Date   • APPENDECTOMY     • BREAST CYST ASPIRATION Right     Years ago   • BREAST CYST EXCISION Right    • BREAST SURGERY Right     Fibroid removed from breast    • ENDOSCOPY N/A 2020    Procedure: ESOPHAGOGASTRODUODENOSCOPY WITH BIOPSY;  Surgeon: Catalino Cuevas MD;  Location: [facility] ENDOSCOPY;  Service: Gastroenterology;  Laterality: N/A;   • HYSTERECTOMY      Partial - unsure of when     Social History     Socioeconomic History   • Marital status:    Tobacco Use   • Smoking status: Former Smoker     Packs/day: 1.50     Years: 25.00     Pack years: 37.50     Quit date: 12/3/2013     Years since quittin.3   • Smokeless tobacco: Never Used   Substance and Sexual Activity   • Alcohol use: No   • Drug use: Never   • Sexual activity: Defer     Family History   Problem Relation Age of Onset   • Emphysema Mother    • Heart attack Father    •  Prostate cancer Father    • Breast cancer Neg Hx    • Ovarian cancer Neg Hx        Review of Systems:   Review of Systems   Constitutional: Negative.    HENT: Negative.    Respiratory: Negative.    Cardiovascular: Negative.    Genitourinary: Positive for dysuria.   Allergic/Immunologic: Negative.    Neurological: Negative.    Psychiatric/Behavioral: Negative.          All other systems were reviewed and are negative.  Exceptions are noted in the subjective or above.      Objective     Vital Signs  Vitals:    03/09/22 1146   BP: 168/77   Pulse: 80   Resp: 15   Temp: 97.8 °F (36.6 °C)   SpO2: 99%       Body mass index is 28.91 kg/m².    Physical Exam  Vitals and nursing note reviewed.   Constitutional:       General: She is not in acute distress.     Appearance: She is well-developed.   Cardiovascular:      Rate and Rhythm: Normal rate and regular rhythm.      Pulses: Normal pulses.      Heart sounds: Normal heart sounds.   Pulmonary:      Effort: Pulmonary effort is normal.      Breath sounds: Normal breath sounds. No wheezing.   Chest:      Chest wall: No tenderness.   Skin:     General: Skin is warm and dry.      Capillary Refill: Capillary refill takes less than 2 seconds.      Coloration: Skin is not pale.      Findings: No erythema or rash.   Neurological:      Mental Status: She is alert and oriented to person, place, and time.   Psychiatric:         Behavior: Behavior normal.         Thought Content: Thought content normal.         Judgment: Judgment normal.              Results Review:    I reviewed the patient's new clinical results.   Office Visit on 03/09/2022   Component Date Value Ref Range Status   • Color 03/09/2022 Yellow  Yellow, Straw, Dark Yellow, Tamara Final   • Clarity, UA 03/09/2022 Cloudy (A) Clear Final   • Specific Gravity  03/09/2022 1.010  1.005 - 1.030 Final   • pH, Urine 03/09/2022 6.0  5.0 - 8.0 Final   • Leukocytes 03/09/2022 Large (3+) (A) Negative Final   • Nitrite, UA 03/09/2022  Negative  Negative Final   • Protein, POC 03/09/2022 Negative  Negative mg/dL Final   • Glucose, UA 03/09/2022 Negative  Negative, 1000 mg/dL (3+) mg/dL Final   • Ketones, UA 03/09/2022 Negative  Negative Final   • Urobilinogen, UA 03/09/2022 Normal  Normal Final   • Bilirubin 03/09/2022 Negative  Negative Final   • Blood, UA 03/09/2022 2+ (A) Negative Final   • Lot Number 03/09/2022 [id number]   Final   • Expiration Date 03/09/2022 07/25/2023   Final         Medication Review:     Current Outpatient Medications:   •  albuterol sulfate HFA (ProAir HFA) 108 (90 Base) MCG/ACT inhaler, Inhale 2 puffs Every 6 (Six) Hours As Needed for Wheezing., Disp: 1 inhaler, Rfl: 5  •  Fluticasone-Umeclidin-Vilant (Trelegy Ellipta) 200-62.5-25 MCG/INH inhaler, Inhale 1 puff Daily., Disp: 60 each, Rfl: 5  •  ipratropium-albuterol (DUO-NEB) 0.5-2.5 mg/3 ml nebulizer, Nebulize q 4 h prn wheezing / shortness of breath, Disp: 360 mL, Rfl: 0  •  levothyroxine (SYNTHROID, LEVOTHROID) 75 MCG tablet, TAKE 1 TABLET BY MOUTH DAILY, Disp: 90 tablet, Rfl: 3  •  omeprazole (priLOSEC) 40 MG capsule, TAKE 1 CAPSULE BY MOUTH DAILY, Disp: 90 capsule, Rfl: 3    Diagnoses and all orders for this visit:    Dysuria  -     POCT urinalysis dipstick, automated  -     Urine Culture - Urine, Urine, Clean Catch  -     nitrofurantoin, macrocrystal-monohydrate, (Macrobid) 100 MG capsule; Take 1 capsule by mouth 2 (Two) Times a Day for 7 days.    Elevated blood pressure reading      Initiate lifestyle modifications.   DASH Diet and exercise.    discussed ways to prevent of long-term complications from high blood pressure.  Discussed when to seek medical attention.  Encouraged patient to take blood pressure daily and keep a log.        Return if symptoms worsen or fail to improve.    Kari Sterlign, MITCHEL  03/09/2022

## 2022-03-12 LAB
BACTERIA UR CULT: ABNORMAL
BACTERIA UR CULT: ABNORMAL
OTHER ANTIBIOTIC SUSC ISLT: ABNORMAL

## 2022-03-14 NOTE — PROGRESS NOTES
Please contact patient and inform them of these results.please let patient know that Rx that I sent in covers E. coli.  Thanks

## 2022-03-18 ENCOUNTER — OFFICE VISIT (OUTPATIENT)
Dept: INTERNAL MEDICINE | Facility: CLINIC | Age: 67
End: 2022-03-18

## 2022-03-18 VITALS
DIASTOLIC BLOOD PRESSURE: 70 MMHG | HEIGHT: 61 IN | BODY MASS INDEX: 28.51 KG/M2 | SYSTOLIC BLOOD PRESSURE: 122 MMHG | OXYGEN SATURATION: 96 % | WEIGHT: 151 LBS | TEMPERATURE: 97.1 F | HEART RATE: 84 BPM

## 2022-03-18 DIAGNOSIS — E03.8 OTHER SPECIFIED HYPOTHYROIDISM: ICD-10-CM

## 2022-03-18 DIAGNOSIS — J43.9 PULMONARY EMPHYSEMA, UNSPECIFIED EMPHYSEMA TYPE: ICD-10-CM

## 2022-03-18 DIAGNOSIS — Z78.0 MENOPAUSE: ICD-10-CM

## 2022-03-18 DIAGNOSIS — R73.9 ELEVATED BLOOD SUGAR: ICD-10-CM

## 2022-03-18 DIAGNOSIS — K21.9 GASTROESOPHAGEAL REFLUX DISEASE WITHOUT ESOPHAGITIS: ICD-10-CM

## 2022-03-18 DIAGNOSIS — E78.2 MIXED HYPERLIPIDEMIA: ICD-10-CM

## 2022-03-18 DIAGNOSIS — Z00.00 ENCOUNTER FOR MEDICARE ANNUAL WELLNESS EXAM: Primary | ICD-10-CM

## 2022-03-18 DIAGNOSIS — Z13.820 SCREENING FOR OSTEOPOROSIS: ICD-10-CM

## 2022-03-18 DIAGNOSIS — E66.3 OVERWEIGHT (BMI 25.0-29.9): ICD-10-CM

## 2022-03-18 PROCEDURE — 1159F MED LIST DOCD IN RCRD: CPT | Performed by: INTERNAL MEDICINE

## 2022-03-18 PROCEDURE — G0439 PPPS, SUBSEQ VISIT: HCPCS | Performed by: INTERNAL MEDICINE

## 2022-03-18 PROCEDURE — 99397 PER PM REEVAL EST PAT 65+ YR: CPT | Performed by: INTERNAL MEDICINE

## 2022-03-18 PROCEDURE — 96160 PT-FOCUSED HLTH RISK ASSMT: CPT | Performed by: INTERNAL MEDICINE

## 2022-03-18 PROCEDURE — 1170F FXNL STATUS ASSESSED: CPT | Performed by: INTERNAL MEDICINE

## 2022-03-18 PROCEDURE — G0009 ADMIN PNEUMOCOCCAL VACCINE: HCPCS | Performed by: INTERNAL MEDICINE

## 2022-03-18 PROCEDURE — 90732 PPSV23 VACC 2 YRS+ SUBQ/IM: CPT | Performed by: INTERNAL MEDICINE

## 2022-03-18 RX ORDER — PANTOPRAZOLE SODIUM 40 MG/1
40 TABLET, DELAYED RELEASE ORAL DAILY
Qty: 90 TABLET | Refills: 3 | Status: SHIPPED | OUTPATIENT
Start: 2022-03-18 | End: 2022-10-17

## 2022-03-18 NOTE — PROGRESS NOTES
The ABCs of the Annual Wellness Visit  Subsequent Medicare Wellness Visit    Chief Complaint   Patient presents with   • Medicare Wellness-subsequent      Subjective    History of Present Illness:  Carola Resendiz is a 66 y.o. female who presents for a Subsequent Medicare Wellness Visit and annual physical exam.  PMH of HLD, hypothyroid, prediabetes, allergies, GERD/ulcer, COPD.  Patient has had 2 COVID vaccines but no booster, no seasonal flu vaccine, no documented pneumococcal vaccine, one hepatitis A vaccine and no shingles vaccine.  Colonoscopy February 2021, mammogram November 2021.  CT lung cancer screening November 2021.  She had a UTI last week and just finished ABX, she is feeling better.  Now she is congested with PND for past few days. She has itchy and watery eyes.  Has not started on any allergy meds.  She was eating too much ice cream for a while.  She does not eat much fried/fast foods.  She does her omeprazole daily and still has GERD.  Some constipation issues.  She takes a lot of TUMS.  In June 2020 EGD showed chronic gastritis and no H pylori.  No changes in swallow, voice.  No depression.  Her sleep is good on and off since she retired.      The following portions of the patient's history were reviewed and   updated as appropriate: allergies, current medications, past family history, past medical history, past social history, past surgical history and problem list.    Compared to one year ago, the patient feels her physical   health is worse.    Compared to one year ago, the patient feels her mental   health is the same.    Recent Hospitalizations:  She was not admitted to the hospital during the last year.       Current Medical Providers:  Patient Care Team:  Vermeesch, Marilyn K, MD as PCP - General (Internal Medicine & Pediatrics)    Outpatient Medications Prior to Visit   Medication Sig Dispense Refill   • albuterol sulfate HFA (ProAir HFA) 108 (90 Base) MCG/ACT inhaler Inhale 2 puffs Every  6 (Six) Hours As Needed for Wheezing. 1 inhaler 5   • Fluticasone-Umeclidin-Vilant (Trelegy Ellipta) 200-62.5-25 MCG/INH inhaler Inhale 1 puff Daily. 60 each 5   • ipratropium-albuterol (DUO-NEB) 0.5-2.5 mg/3 ml nebulizer Nebulize q 4 h prn wheezing / shortness of breath 360 mL 0   • levothyroxine (SYNTHROID, LEVOTHROID) 75 MCG tablet TAKE 1 TABLET BY MOUTH DAILY 90 tablet 3   • omeprazole (priLOSEC) 40 MG capsule TAKE 1 CAPSULE BY MOUTH DAILY 90 capsule 3     No facility-administered medications prior to visit.       No opioid medication identified on active medication list. I have reviewed chart for other potential  high risk medication/s and harmful drug interactions in the elderly.          Aspirin is not on active medication list.  Aspirin use is not indicated based on review of current medical condition/s. Risk of harm outweighs potential benefits.  .    Patient Active Problem List   Diagnosis   • Chronic obstructive pulmonary disease (HCC)   • Gastroesophageal reflux disease without esophagitis   • Hypothyroidism   • Peptic ulcer   • Screening mammogram, encounter for   • Environmental allergies   • Bilateral hip pain   • Encounter for Medicare annual wellness exam   • Mixed hyperlipidemia   • Elevated blood sugar   • Overweight (BMI 25.0-29.9)   • Screening for osteoporosis   • Menopause     Advance Care Planning  Advance Directive is not on file.  ACP discussion was held with the patient during this visit. Patient does not have an advance directive, information provided.    Review of Systems   Constitutional: Negative.  Negative for chills and fever.   HENT: Positive for congestion, postnasal drip and voice change.    Eyes: Negative.    Respiratory: Positive for cough. Negative for shortness of breath and wheezing.    Cardiovascular: Negative.    Gastrointestinal:        Reflux   Endocrine: Negative.    Genitourinary: Negative.    Musculoskeletal: Negative.    Skin: Negative.    Allergic/Immunologic:  "Positive for environmental allergies.   Neurological: Negative.    Hematological: Negative.    Psychiatric/Behavioral: Positive for sleep disturbance.        Objective    Vitals:    03/18/22 1305   BP: 122/70   Pulse: 84   Temp: 97.1 °F (36.2 °C)   SpO2: 96%   Weight: 68.5 kg (151 lb)   Height: 154.9 cm (61\")   PainSc: 0-No pain     BMI Readings from Last 1 Encounters:   03/18/22 28.53 kg/m²   BMI is above normal parameters. Recommendations include: exercise counseling and nutrition counseling    Does the patient have evidence of cognitive impairment? No    Physical Exam  Vitals and nursing note reviewed.   Constitutional:       General: She is not in acute distress.     Appearance: Normal appearance. She is well-developed. She is not ill-appearing.      Comments: Kind and pleasant female, appears stated age and in NAD today   HENT:      Head: Normocephalic and atraumatic.      Right Ear: Tympanic membrane, ear canal and external ear normal.      Left Ear: Tympanic membrane, ear canal and external ear normal.      Nose: Congestion present.      Comments: Turbinates slightly inflamed with white rhinorrhea, no sinus tenderness     Mouth/Throat:      Pharynx: No posterior oropharyngeal erythema.      Comments: Scant amount of white postnasal drip noted  Eyes:      General:         Right eye: No discharge.         Left eye: No discharge.      Extraocular Movements: Extraocular movements intact.      Conjunctiva/sclera: Conjunctivae normal.      Pupils: Pupils are equal, round, and reactive to light.   Neck:      Thyroid: No thyromegaly.      Vascular: No carotid bruit.      Comments: No thyromegaly or mass  Cardiovascular:      Rate and Rhythm: Normal rate and regular rhythm.      Pulses: Normal pulses.      Heart sounds: Normal heart sounds. No murmur heard.  Pulmonary:      Effort: Pulmonary effort is normal. No respiratory distress.      Breath sounds: No wheezing.      Comments: Decreased breath sounds throughout " all lung fields  Abdominal:      General: Bowel sounds are normal. There is no distension.      Palpations: Abdomen is soft.      Tenderness: There is no abdominal tenderness.   Musculoskeletal:         General: Normal range of motion.      Cervical back: Normal range of motion and neck supple.      Right lower leg: No edema.      Left lower leg: No edema.   Lymphadenopathy:      Cervical: No cervical adenopathy.   Skin:     General: Skin is warm.      Findings: No rash.   Neurological:      General: No focal deficit present.      Mental Status: She is alert and oriented to person, place, and time. Mental status is at baseline.      Cranial Nerves: No cranial nerve deficit.      Motor: No weakness.      Coordination: Coordination normal.      Gait: Gait normal.   Psychiatric:         Mood and Affect: Mood normal.         Behavior: Behavior normal.         Thought Content: Thought content normal.         Judgment: Judgment normal.                 HEALTH RISK ASSESSMENT    Smoking Status:  Social History     Tobacco Use   Smoking Status Former Smoker   • Packs/day: 1.50   • Years: 25.00   • Pack years: 37.50   • Quit date: 12/3/2013   • Years since quittin.2   Smokeless Tobacco Never Used     Alcohol Consumption:  Social History     Substance and Sexual Activity   Alcohol Use No     Fall Risk Screen:    STEADI Fall Risk Assessment was completed, and patient is at LOW risk for falls.Assessment completed on:3/18/2022    Depression Screening:  PHQ-2/PHQ-9 Depression Screening 3/18/2022   Retired Total Score -   Little Interest or Pleasure in Doing Things 0-->not at all   Feeling Down, Depressed or Hopeless 0-->not at all   PHQ-9: Brief Depression Severity Measure Score 0       Health Habits and Functional and Cognitive Screening:  Functional & Cognitive Status 3/18/2022   Do you have difficulty preparing food and eating? No   Do you have difficulty bathing yourself, getting dressed or grooming yourself? No   Do you  have difficulty using the toilet? No   Do you have difficulty moving around from place to place? No   Do you have trouble with steps or getting out of a bed or a chair? No   Current Diet Limited Junk Food   Dental Exam Up to date   Eye Exam Up to date   Exercise (times per week) -   Do you need help using the phone?  No   Are you deaf or do you have serious difficulty hearing?  No   Do you need help with transportation? No   Do you need help shopping? No   Do you need help preparing meals?  No   Do you need help with housework?  No   Do you need help with laundry? No   Do you need help taking your medications? No   Do you need help managing money? No   Do you ever drive or ride in a car without wearing a seat belt? No   Have you felt unusual stress, anger or loneliness in the last month? Yes   Who do you live with? Spouse   If you need help, do you have trouble finding someone available to you? No   Have you been bothered in the last four weeks by sexual problems? No   Do you have difficulty concentrating, remembering or making decisions? Yes       Age-appropriate Screening Schedule:  Refer to the list below for future screening recommendations based on patient's age, sex and/or medical conditions. Orders for these recommended tests are listed in the plan section. The patient has been provided with a written plan.    Health Maintenance   Topic Date Due   • DXA SCAN  Never done   • ZOSTER VACCINE (2 of 2) 06/01/2017   • INFLUENZA VACCINE  03/18/2023 (Originally 8/1/2021)   • LIPID PANEL  03/19/2022   • MAMMOGRAM  11/05/2023   • TDAP/TD VACCINES (2 - Td or Tdap) 04/06/2027   • PAP SMEAR  Discontinued              Assessment/Plan   CMS Preventative Services Quick Reference  Risk Factors Identified During Encounter  Immunizations Discussed/Encouraged (specific Immunizations; Pneumococcal 23  Obesity/Overweight   The above risks/problems have been discussed with the patient.  Follow up actions/plans if indicated are seen  below in the Assessment/Plan Section.  Pertinent information has been shared with the patient in the After Visit Summary.    Diagnoses and all orders for this visit:    1. Encounter for Medicare annual wellness exam (Primary)  -     CBC & Differential  -     Comprehensive Metabolic Panel  -     Lipid Panel With / Chol / HDL Ratio  -     T4, Free  -     TSH  -     Hemoglobin A1c    2. Mixed hyperlipidemia  -     Comprehensive Metabolic Panel  -     Lipid Panel With / Chol / HDL Ratio    3. Elevated blood sugar  -     Comprehensive Metabolic Panel  -     Hemoglobin A1c    4. Other specified hypothyroidism  -     T4, Free  -     TSH    5. Overweight (BMI 25.0-29.9)    6. Gastroesophageal reflux disease without esophagitis  -     CBC & Differential    7. Pulmonary emphysema, unspecified emphysema type (HCC)    8. Screening for osteoporosis  -     DEXA Bone Density Axial; Future    9. Menopause  -     DEXA Bone Density Axial; Future    Other orders  -     pantoprazole (Protonix) 40 MG EC tablet; Take 1 tablet by mouth Daily.  Dispense: 90 tablet; Refill: 3  -     Pneumococcal Polysaccharide Vaccine 23-Valent (PPSV23) Greater Than or Equal To 1yo Subcutaneous / IM    Follow heart healthy/low cholesterol diet  Avoid processed/fried foods/fast food  Exercise as tolerated up to 30 minutes 5 days a week  Take all medications as prescribed  Home BP readings are improved compared to 10 days ago when patient was sick  Labs as noted  Discontinue omeprazole and change to Protonix to see if GERD is in better control with medication change  Continue current dose of levothyroxine, check labs and if needed will make medication change  Recommend Flonase and Zyrtec 1 hour before sleep at night for current allergy symptoms  Advanced directives book given to patient today, request copy when completed  Pneumovax 23 provided today  DEXA scan ordered      Follow Up:   Return in about 6 months (around 9/18/2022) for Next scheduled follow up.      An After Visit Summary and PPPS were made available to the patient.

## 2022-03-21 ENCOUNTER — TELEPHONE (OUTPATIENT)
Dept: INTERNAL MEDICINE | Facility: CLINIC | Age: 67
End: 2022-03-21

## 2022-03-21 RX ORDER — AZITHROMYCIN 250 MG/1
TABLET, FILM COATED ORAL
Qty: 6 TABLET | Refills: 0 | OUTPATIENT
Start: 2022-03-21 | End: 2022-04-03

## 2022-03-21 RX ORDER — IPRATROPIUM BROMIDE 21 UG/1
2 SPRAY, METERED NASAL EVERY 12 HOURS
Qty: 1 EACH | Refills: 0 | Status: SHIPPED | OUTPATIENT
Start: 2022-03-21 | End: 2022-03-22 | Stop reason: SDUPTHER

## 2022-03-22 ENCOUNTER — APPOINTMENT (OUTPATIENT)
Dept: BONE DENSITY | Facility: HOSPITAL | Age: 67
End: 2022-03-22

## 2022-03-22 RX ORDER — IPRATROPIUM BROMIDE 21 UG/1
2 SPRAY, METERED NASAL EVERY 12 HOURS
Qty: 60 ML | Refills: 0 | Status: SHIPPED | OUTPATIENT
Start: 2022-03-22

## 2022-03-29 ENCOUNTER — HOSPITAL ENCOUNTER (OUTPATIENT)
Dept: CT IMAGING | Facility: HOSPITAL | Age: 67
Discharge: HOME | End: 2022-03-29
Admitting: NURSE PRACTITIONER

## 2022-03-29 DIAGNOSIS — R93.89 ABNORMAL CT OF THE CHEST: ICD-10-CM

## 2022-03-29 PROCEDURE — 71250 CT THORAX DX C-: CPT

## 2022-04-03 PROCEDURE — U0004 COV-19 TEST NON-CDC HGH THRU: HCPCS | Performed by: FAMILY MEDICINE

## 2022-04-04 ENCOUNTER — TELEPHONE (OUTPATIENT)
Dept: INTERNAL MEDICINE | Facility: CLINIC | Age: 67
End: 2022-04-04

## 2022-04-04 NOTE — TELEPHONE ENCOUNTER
Please tell patient she does not get sick from pneumonia vaccine.  This is not a live vaccine so she cannot get sick from it.

## 2022-04-04 NOTE — TELEPHONE ENCOUNTER
Caller: Carola Resendiz    Relationship: Self    Best call back number: [phone]     What was the call regarding: PATIENT CALLED STATING THAT SHE WAS DIAGNOSED WITH PNEUMONIA IN THE LEFT LUNG AND LOOKS LIKE ITS BEGINNING TO GO TO THE RIGHT.  PATIENT STATED THAT TWO WEEKS AGO SHE HAD A PNEUMONIA VACCINE.  PATIENT ASKED IF SHE IS SUPPOSE TO GET SICK FROM GETTING THE SHOT.      Do you require a callback:YES

## 2022-04-07 DIAGNOSIS — R93.89 ABNORMAL CT OF THE CHEST: Primary | ICD-10-CM

## 2022-04-20 ENCOUNTER — OFFICE VISIT (OUTPATIENT)
Dept: PULMONOLOGY | Facility: CLINIC | Age: 67
End: 2022-04-20

## 2022-04-20 VITALS
DIASTOLIC BLOOD PRESSURE: 68 MMHG | BODY MASS INDEX: 28.53 KG/M2 | RESPIRATION RATE: 16 BRPM | SYSTOLIC BLOOD PRESSURE: 164 MMHG | WEIGHT: 151 LBS | HEART RATE: 67 BPM | OXYGEN SATURATION: 98 % | TEMPERATURE: 97.1 F

## 2022-04-20 DIAGNOSIS — J30.89 OTHER ALLERGIC RHINITIS: ICD-10-CM

## 2022-04-20 DIAGNOSIS — J44.9 CHRONIC OBSTRUCTIVE PULMONARY DISEASE, UNSPECIFIED COPD TYPE: Primary | ICD-10-CM

## 2022-04-20 DIAGNOSIS — R93.89 ABNORMAL CT OF THE CHEST: ICD-10-CM

## 2022-04-20 PROCEDURE — 99214 OFFICE O/P EST MOD 30 MIN: CPT | Performed by: INTERNAL MEDICINE

## 2022-04-20 RX ORDER — AZELASTINE 1 MG/ML
1 SPRAY, METERED NASAL 2 TIMES DAILY PRN
Qty: 1 EACH | Refills: 5 | Status: SHIPPED | OUTPATIENT
Start: 2022-04-20

## 2022-04-20 NOTE — PROGRESS NOTES
Chief Complaint   Patient presents with   • Follow-up   • Shortness of Breath       Subjective   Carola Resendiz is a 66 y.o. female.     History of Present Illness   Patient was evaluated today for follow up of shortness of breath, allergic rhinitis and COPD.     Patient says that her symptoms have been stable since the last clinic visit. she reports 1 recent exacerbation requiring antibiotics and steroids.    Patient is using Trelegy, as prescribed. Exercise tolerance has also remained stable.     Quit smoking 8 years ago.     Patient says that she has not been using her nasal sprays on a seasonal basis and is noticing worsening nasal congestion as well as occasional runny nose.      The following portions of the patient's history were reviewed and updated as appropriate: allergies, current medications, past family history, past medical history, past social history and past surgical history.    Review of Systems   Constitutional: Negative for chills and fatigue.   HENT: Positive for sinus pressure and sinus pain.    Respiratory: Negative for wheezing.    Psychiatric/Behavioral: Negative for sleep disturbance.       Objective   Visit Vitals  /68 (BP Location: Left arm, Patient Position: Sitting, Cuff Size: Adult)   Pulse 67   Temp 97.1 °F (36.2 °C)   Resp 16   Wt 68.5 kg (151 lb)   SpO2 98%   BMI 28.53 kg/m²       Physical Exam  Vitals reviewed.   Constitutional:       Appearance: She is well-developed.   HENT:      Nose:      Comments: Nasal erythema noted.     Mouth/Throat:      Comments: Oropharynx was somewhat cobblestoned.  Cardiovascular:      Rate and Rhythm: Normal rate and regular rhythm.   Pulmonary:      Effort: Pulmonary effort is normal. No respiratory distress.      Comments: Somewhat hyperresonant to percussion.  Somewhat decreased air entry.  No wheezing noted.   Musculoskeletal:      Cervical back: Neck supple.      Comments: Gait was slow.   Skin:     General: Skin is warm and dry.    Neurological:      Mental Status: She is alert and oriented to person, place, and time.         Assessment/Plan   Diagnoses and all orders for this visit:    1. Chronic obstructive pulmonary disease, unspecified COPD type (HCC) (Primary)  -     Overnight Sleep Oximetry Study; Future  -     Pulmonary Function Test; Future    2. Other allergic rhinitis    3. Abnormal CT of the chest  -     CT Chest Without Contrast Diagnostic; Future    Other orders  -     azelastine (ASTELIN) 0.1 % nasal spray; 1 spray into the nostril(s) as directed by provider 2 (Two) Times a Day As Needed for Rhinitis or Allergies. Use in each nostril as directed  Dispense: 1 each; Refill: 5  -     Fluticasone-Umeclidin-Vilant (Trelegy Ellipta) 200-62.5-25 MCG/INH inhaler; Inhale 1 puff Daily.  Dispense: 60 each; Refill: 5           Return in about 6 months (around 10/31/2022) for Recheck, PFT F/U, Imaging, Overnight P Ox, For Tasha Boston), ....Also 13 mths w/ Dr. Lin.    DISCUSSION (if any):  I reviewed the patient's UC note that mentioned prescription for Levaquin and prednisone pack.  It also listed community-acquired pneumonia and emphysema.    Last CT scan was reviewed in great detail with the patient. Images reviewed personally.   Results for orders placed during the hospital encounter of 03/29/22    CT Chest Without Contrast Diagnostic    Narrative  CT CHEST WITHOUT CONTRAST    HISTORY: Follow-up    COMPARISON: 11/05/2021    FINDINGS: Axial CT images of the chest were obtained without contrast.  Coronal reformatted images were also obtained. This study was performed  with techniques to keep radiation doses as low as reasonably achievable,  (ALARA). Individualized dose reduction techniques using automated  exposure control or adjustment of mA and/or kV according to the patient  size were employed.    There is no evidence of mediastinal or hilar mass or adenopathy. No  axillary mass or adenopathy is identified. Unchanged 1.6 cm right  apical  groundglass nodularity. No focal consolidation. No chest wall  abnormality is identified. Limited images of the upper abdomen reveal no  mass or localized inflammatory process.    Impression  Unchanged 1.6 cm right apical groundglass nodularity.  Consider follow-up in 6 months.      This report was signed and finalized on 3/29/2022 3:21 PM by Concetta Fernandes MD.    I have also reviewed laboratory data.  Lab Results   Component Value Date    EOSABS 0.32 03/19/2021    EOSABS 0.22 07/09/2020    EOSABS 0.37 05/19/2020    &   Lab Results   Component Value Date    CO2 28.5 03/19/2021     Laboratory data was reviewed with her.   Lab Results   Component Value Date    AFPTM 147 02/08/2021     Lab Results   Component Value Date    PHENOTYPE MM 02/08/2021     Latest available PFTs were reviewed.  Consistent with severe COPD. Performed in Oct 2020.    PFTs will be ordered to be done upon follow up.    We have reviewed her pulmonary medications in great detail.    For now, we will continue Trelegy 200, but if she has another episode of pneumonia, then we may consider changing the dose to 100.     Compliance with medications stressed.     Side effects of prescribed medications discussed with the patient    Will repeat CT in October 2022.    If the next CT shows resolution of the abnormality detected on the CT in March, we will resume LDCt.     Patient will be started on nasal spray for symptoms which are definitely consistent with allergic rhinitis.     If her symptoms do not improve, then we will consider ordering IgE/RAST panel.    The following have been ordered, either before or at the time of the next visit.    CT of the chest  PFTs/spirometry  Overnight pulse ox        Dictated utilizing Dragon dictation.    This document was electronically signed by Lenny Lin MD on 04/20/22 at 11:44 EDT

## 2022-05-19 ENCOUNTER — APPOINTMENT (OUTPATIENT)
Dept: BONE DENSITY | Facility: HOSPITAL | Age: 67
End: 2022-05-19

## 2022-05-19 DIAGNOSIS — Z13.820 SCREENING FOR OSTEOPOROSIS: ICD-10-CM

## 2022-05-19 DIAGNOSIS — Z78.0 MENOPAUSE: ICD-10-CM

## 2022-05-19 PROCEDURE — 77080 DXA BONE DENSITY AXIAL: CPT

## 2022-05-19 NOTE — PROGRESS NOTES
Please contact patient and let her know that bone density is consistent with osteoporosis.  I recommend a plant-based calcium that she can look up online and take 500 mg a.m. and p.m. as well as vitamin D 2000 units daily and magnesium citrate 250 mg daily.  It is important that she do weightbearing exercise such as walking for 30 minutes 5 days a week.  If she is interested in medication for her bone density I will make referral to a rheumatologist or endocrinologist, however I do not provide medication due to possible side effects.

## 2022-06-14 ENCOUNTER — OFFICE VISIT (OUTPATIENT)
Dept: INTERNAL MEDICINE | Facility: CLINIC | Age: 67
End: 2022-06-14

## 2022-06-14 VITALS
HEIGHT: 61 IN | BODY MASS INDEX: 28.51 KG/M2 | SYSTOLIC BLOOD PRESSURE: 132 MMHG | WEIGHT: 151 LBS | OXYGEN SATURATION: 98 % | DIASTOLIC BLOOD PRESSURE: 78 MMHG | HEART RATE: 68 BPM | TEMPERATURE: 97.1 F

## 2022-06-14 DIAGNOSIS — H10.33 ACUTE BACTERIAL CONJUNCTIVITIS OF BOTH EYES: Primary | ICD-10-CM

## 2022-06-14 DIAGNOSIS — L23.7 ALLERGIC CONTACT DERMATITIS DUE TO PLANTS, EXCEPT FOOD: ICD-10-CM

## 2022-06-14 PROCEDURE — 99214 OFFICE O/P EST MOD 30 MIN: CPT | Performed by: INTERNAL MEDICINE

## 2022-06-14 RX ORDER — METHYLPREDNISOLONE 4 MG/1
TABLET ORAL
Qty: 1 EACH | Refills: 0 | Status: SHIPPED | OUTPATIENT
Start: 2022-06-14 | End: 2022-10-17

## 2022-06-14 RX ORDER — ERYTHROMYCIN 5 MG/G
OINTMENT OPHTHALMIC 4 TIMES DAILY
Qty: 3.5 G | Refills: 0 | Status: SHIPPED | OUTPATIENT
Start: 2022-06-14 | End: 2022-10-17

## 2022-06-14 NOTE — PROGRESS NOTES
"Chief Complaint   Patient presents with   • Abstract     Pt has a red itchy painful rash on and around left breast and on right hand. Eyes are have been red and watering.      Subjective   Carola Resendiz is a 66 y.o. female.     Pt here today with rash on left breast and right hand.  She pulled some weeds out of her bushes a few weeks ago.  She got a rash on right hand and her left breast the following day.  She put a cream on right hand from her daughter but does not know what it was, but states it was quite helpful.  This rash is very itchy.  She is having some drainage from rash on left breast.    She also has red and itchy and watery eyes.  In the AM her eyes are stuck shut.  She has yellow discharge all day.         The following portions of the patient's history were reviewed and updated as appropriate: allergies, current medications, past family history, past medical history, past social history, past surgical history and problem list.    Review of Systems   Eyes: Positive for discharge, redness and itching. Negative for photophobia, pain and visual disturbance.   Skin: Positive for color change and rash.       Objective   /78   Pulse 68   Temp 97.1 °F (36.2 °C)   Ht 154.9 cm (61\")   Wt 68.5 kg (151 lb)   SpO2 98%   BMI 28.53 kg/m²   Body mass index is 28.53 kg/m².  Physical Exam  Vitals and nursing note reviewed.   Constitutional:       General: She is not in acute distress.     Appearance: Normal appearance. She is not ill-appearing.      Comments: Kind and pleasant female in no distress today   Eyes:      General:         Right eye: Discharge present.         Left eye: Discharge present.     Extraocular Movements: Extraocular movements intact.      Comments: Conjunctiva erythematous, yellow discharge noted from eyes bilaterally   Pulmonary:      Effort: Pulmonary effort is normal. No respiratory distress.   Musculoskeletal:      Cervical back: No tenderness.   Lymphadenopathy:      Cervical: " No cervical adenopathy.   Skin:     Findings: Erythema and rash present.      Comments: Entire left breast is erythematous with peeling and some clear drainage, rash extends up to shoulder area and near axilla, there are papules and few ruptured blisters  Right hand index and third finger with papules and scabs that are healing   Neurological:      General: No focal deficit present.      Mental Status: She is alert and oriented to person, place, and time. Mental status is at baseline.      Cranial Nerves: No cranial nerve deficit.   Psychiatric:         Mood and Affect: Mood normal.         Behavior: Behavior normal.         Thought Content: Thought content normal.         Judgment: Judgment normal.         Assessment & Plan   Carola Resendiz is here today and the following problems have been addressed:      Diagnoses and all orders for this visit:    1. Acute bacterial conjunctivitis of both eyes (Primary)    2. Allergic contact dermatitis due to plants, except food    Other orders  -     methylPREDNISolone (MEDROL) 4 MG dose pack; Take as directed on package instructions.  Dispense: 1 each; Refill: 0  -     erythromycin (ROMYCIN) 5 MG/GM ophthalmic ointment; Administer  to both eyes 4 (Four) Times a Day.  Dispense: 3.5 g; Refill: 0    Bilateral conjunctivitis, treated with erythromycin ointment, apply ribbon to inner lower eyelid 4 times daily until resolved  Also provided Medrol Dosepak due to rather severe plant contact dermatitis of right hand and left breast, take as directed  Also recommend she mix 1% hydrocortisone with Cetaphil moisturizing cream, 50-50, apply to left breast rash and right hand rash 2-3 times daily as needed  Patient was told if left breast rash is not gone within 1 week she is to return to clinic for evaluation of rash again    Return to clinic as needed if symptoms worsen or persist    Please note that portions of this note were completed with a voice recognition program.  Efforts were  made to edit dictation, but occasionally words are mistranscribed.

## 2022-08-29 RX ORDER — LEVOTHYROXINE SODIUM 0.07 MG/1
75 TABLET ORAL DAILY
Qty: 90 TABLET | Refills: 1 | OUTPATIENT
Start: 2022-08-29

## 2022-08-29 NOTE — TELEPHONE ENCOUNTER
Rx Refill Note  Requested Prescriptions     Pending Prescriptions Disp Refills   • levothyroxine (SYNTHROID, LEVOTHROID) 75 MCG tablet [Pharmacy Med Name: LEVOTHYROXINE 0.075MG (75MCG) TABS] 90 tablet 3     Sig: TAKE 1 TABLET BY MOUTH DAILY      Last office visit with prescribing clinician: 6/14/2022      Next office visit with prescribing clinician: 9/19/2022            Lulu Mcneil LPN  08/29/22, 12:10 EDT

## 2022-08-30 NOTE — TELEPHONE ENCOUNTER
Left voicemail for patient to have labs done before Dr Vermeesch could refill her thyroid medication.

## 2022-08-31 LAB
ALBUMIN SERPL-MCNC: 4.2 G/DL (ref 3.5–5.2)
ALBUMIN/GLOB SERPL: 1.5 G/DL
ALP SERPL-CCNC: 90 U/L (ref 39–117)
ALT SERPL-CCNC: 16 U/L (ref 1–33)
AST SERPL-CCNC: 17 U/L (ref 1–32)
BASOPHILS # BLD AUTO: 0.05 10*3/MM3 (ref 0–0.2)
BASOPHILS NFR BLD AUTO: 0.7 % (ref 0–1.5)
BILIRUB SERPL-MCNC: 0.3 MG/DL (ref 0–1.2)
BUN SERPL-MCNC: 17 MG/DL (ref 8–23)
BUN/CREAT SERPL: 23.6 (ref 7–25)
CALCIUM SERPL-MCNC: 9.5 MG/DL (ref 8.6–10.5)
CHLORIDE SERPL-SCNC: 103 MMOL/L (ref 98–107)
CHOLEST SERPL-MCNC: 233 MG/DL (ref 0–200)
CHOLEST/HDLC SERPL: 3.24 {RATIO}
CO2 SERPL-SCNC: 30.4 MMOL/L (ref 22–29)
CREAT SERPL-MCNC: 0.72 MG/DL (ref 0.57–1)
EGFRCR-CYS SERPLBLD CKD-EPI 2021: 91.8 ML/MIN/1.73
EOSINOPHIL # BLD AUTO: 0.32 10*3/MM3 (ref 0–0.4)
EOSINOPHIL NFR BLD AUTO: 4.4 % (ref 0.3–6.2)
ERYTHROCYTE [DISTWIDTH] IN BLOOD BY AUTOMATED COUNT: 12.2 % (ref 12.3–15.4)
GLOBULIN SER CALC-MCNC: 2.8 GM/DL
GLUCOSE SERPL-MCNC: 90 MG/DL (ref 65–99)
HBA1C MFR BLD: 5.7 % (ref 4.8–5.6)
HCT VFR BLD AUTO: 38.8 % (ref 34–46.6)
HDLC SERPL-MCNC: 72 MG/DL (ref 40–60)
HGB BLD-MCNC: 12.9 G/DL (ref 12–15.9)
IMM GRANULOCYTES # BLD AUTO: 0.02 10*3/MM3 (ref 0–0.05)
IMM GRANULOCYTES NFR BLD AUTO: 0.3 % (ref 0–0.5)
LDLC SERPL CALC-MCNC: 138 MG/DL (ref 0–100)
LYMPHOCYTES # BLD AUTO: 1.98 10*3/MM3 (ref 0.7–3.1)
LYMPHOCYTES NFR BLD AUTO: 27.1 % (ref 19.6–45.3)
MCH RBC QN AUTO: 30.4 PG (ref 26.6–33)
MCHC RBC AUTO-ENTMCNC: 33.2 G/DL (ref 31.5–35.7)
MCV RBC AUTO: 91.3 FL (ref 79–97)
MONOCYTES # BLD AUTO: 0.56 10*3/MM3 (ref 0.1–0.9)
MONOCYTES NFR BLD AUTO: 7.7 % (ref 5–12)
NEUTROPHILS # BLD AUTO: 4.38 10*3/MM3 (ref 1.7–7)
NEUTROPHILS NFR BLD AUTO: 59.8 % (ref 42.7–76)
NRBC BLD AUTO-RTO: 0 /100 WBC (ref 0–0.2)
PLATELET # BLD AUTO: 347 10*3/MM3 (ref 140–450)
POTASSIUM SERPL-SCNC: 5.2 MMOL/L (ref 3.5–5.2)
PROT SERPL-MCNC: 7 G/DL (ref 6–8.5)
RBC # BLD AUTO: 4.25 10*6/MM3 (ref 3.77–5.28)
SODIUM SERPL-SCNC: 140 MMOL/L (ref 136–145)
T4 FREE SERPL-MCNC: 0.87 NG/DL (ref 0.93–1.7)
TRIGL SERPL-MCNC: 130 MG/DL (ref 0–150)
TSH SERPL DL<=0.005 MIU/L-ACNC: 8.01 UIU/ML (ref 0.27–4.2)
VLDLC SERPL CALC-MCNC: 23 MG/DL (ref 5–40)
WBC # BLD AUTO: 7.31 10*3/MM3 (ref 3.4–10.8)

## 2022-09-01 DIAGNOSIS — E03.8 OTHER SPECIFIED HYPOTHYROIDISM: Primary | ICD-10-CM

## 2022-09-01 RX ORDER — LEVOTHYROXINE SODIUM 88 UG/1
88 TABLET ORAL DAILY
Qty: 30 TABLET | Refills: 1 | Status: SHIPPED | OUTPATIENT
Start: 2022-09-01 | End: 2022-09-28 | Stop reason: SDUPTHER

## 2022-09-28 DIAGNOSIS — E03.8 OTHER SPECIFIED HYPOTHYROIDISM: ICD-10-CM

## 2022-09-28 RX ORDER — LEVOTHYROXINE SODIUM 88 UG/1
88 TABLET ORAL DAILY
Qty: 30 TABLET | Refills: 0 | Status: SHIPPED | OUTPATIENT
Start: 2022-09-28 | End: 2022-10-14

## 2022-09-29 ENCOUNTER — HOSPITAL ENCOUNTER (OUTPATIENT)
Dept: CT IMAGING | Facility: HOSPITAL | Age: 67
End: 2022-09-29

## 2022-09-29 ENCOUNTER — HOSPITAL ENCOUNTER (OUTPATIENT)
Dept: CT IMAGING | Facility: HOSPITAL | Age: 67
Discharge: HOME | End: 2022-09-29
Admitting: NURSE PRACTITIONER

## 2022-09-29 DIAGNOSIS — R93.89 ABNORMAL CT OF THE CHEST: ICD-10-CM

## 2022-09-29 PROCEDURE — 71250 CT THORAX DX C-: CPT

## 2022-10-13 LAB
T4 FREE SERPL-MCNC: 1.65 NG/DL (ref 0.93–1.7)
TSH SERPL DL<=0.005 MIU/L-ACNC: 0.7 UIU/ML (ref 0.27–4.2)

## 2022-10-14 DIAGNOSIS — E03.8 OTHER SPECIFIED HYPOTHYROIDISM: ICD-10-CM

## 2022-10-14 RX ORDER — LEVOTHYROXINE SODIUM 88 UG/1
88 TABLET ORAL DAILY
Qty: 90 TABLET | Refills: 1 | Status: SHIPPED | OUTPATIENT
Start: 2022-10-14

## 2022-10-17 ENCOUNTER — OFFICE VISIT (OUTPATIENT)
Dept: INTERNAL MEDICINE | Facility: CLINIC | Age: 67
End: 2022-10-17

## 2022-10-17 VITALS
SYSTOLIC BLOOD PRESSURE: 124 MMHG | DIASTOLIC BLOOD PRESSURE: 68 MMHG | BODY MASS INDEX: 28.89 KG/M2 | OXYGEN SATURATION: 97 % | HEART RATE: 69 BPM | WEIGHT: 153 LBS | HEIGHT: 61 IN | TEMPERATURE: 97.7 F

## 2022-10-17 DIAGNOSIS — K21.9 GASTROESOPHAGEAL REFLUX DISEASE WITHOUT ESOPHAGITIS: ICD-10-CM

## 2022-10-17 DIAGNOSIS — E03.8 OTHER SPECIFIED HYPOTHYROIDISM: ICD-10-CM

## 2022-10-17 DIAGNOSIS — E78.2 MIXED HYPERLIPIDEMIA: Primary | ICD-10-CM

## 2022-10-17 DIAGNOSIS — J43.9 PULMONARY EMPHYSEMA, UNSPECIFIED EMPHYSEMA TYPE: ICD-10-CM

## 2022-10-17 DIAGNOSIS — R73.9 ELEVATED BLOOD SUGAR: ICD-10-CM

## 2022-10-17 PROBLEM — H10.33 ACUTE BACTERIAL CONJUNCTIVITIS OF BOTH EYES: Status: RESOLVED | Noted: 2022-06-14 | Resolved: 2022-10-17

## 2022-10-17 PROCEDURE — G0008 ADMIN INFLUENZA VIRUS VAC: HCPCS | Performed by: INTERNAL MEDICINE

## 2022-10-17 PROCEDURE — 99214 OFFICE O/P EST MOD 30 MIN: CPT | Performed by: INTERNAL MEDICINE

## 2022-10-17 PROCEDURE — 90662 IIV NO PRSV INCREASED AG IM: CPT | Performed by: INTERNAL MEDICINE

## 2022-10-17 RX ORDER — OMEPRAZOLE 40 MG/1
40 CAPSULE, DELAYED RELEASE ORAL DAILY
Qty: 90 CAPSULE | Refills: 1 | Status: SHIPPED | OUTPATIENT
Start: 2022-10-17

## 2022-10-17 NOTE — PROGRESS NOTES
Chief Complaint   Patient presents with   • Follow-up     For GERD, hypothyroidism, and HLD. Pt states Protonix didn't help with her heartburn, went back on Omeprazole (has been taking for about 2 weeks). Requesting script for Omeprazole as well.     Subjective   Carola Resendiz is a 66 y.o. female.     History of Present Illness  Here today for follow-up of COPD, GERD, hypothyroid and allergies.  COPD/allergies- she is using her Trelegy inhaler 3-4 times a week and albuterol as needed 2-3 times a week.  She is being followed by pulmonary for a posterior opacity in right lung-CT scan in April revealed 1.6 cm right apical nodularity that is unchanged.    GERD- she takes her omeprazole daily on empty stomach.  She states that Protonix was not helpful and she reverted back to omeprazole.  She has no GERD with prilosec  Hypothyroid- she is compliant with her thyroid med daily.  TFTs were abnormal approximately 6 weeks ago and medication was adjusted.  Repeat TFTs are normal.  Denies any difficulty with swallow, change in voice, depression.  She does have some constipation  Recent lipid panel show elevated total and LDL.  She will start daily metamucil.   HCM-colonoscopy 2021.  Mammogram 2021.  DEXA scan 2022.  Had a COVID vaccines but no booster, provided flu vaccine today.  A1c 5.7 approximately 6 weeks ago.       The following portions of the patient's history were reviewed and updated as appropriate: allergies, current medications, past family history, past medical history, past social history, past surgical history and problem list.    Review of Systems   Constitutional: Negative for activity change, appetite change and unexpected weight change.   Eyes: Negative for visual disturbance.   Respiratory: Positive for cough and shortness of breath.    Cardiovascular: Negative for chest pain and palpitations.   Gastrointestinal: Negative for abdominal pain.        Occasional GERD symptoms   Musculoskeletal: Negative for  "back pain.   Neurological: Negative for headaches.   Psychiatric/Behavioral: Negative for dysphoric mood and sleep disturbance.       Objective   /68   Pulse 69   Temp 97.7 °F (36.5 °C)   Ht 154.9 cm (61\")   Wt 69.4 kg (153 lb)   SpO2 97%   BMI 28.91 kg/m²   Body mass index is 28.91 kg/m².  Physical Exam  Vitals and nursing note reviewed.   Constitutional:       General: She is not in acute distress.     Appearance: Normal appearance. She is well-developed. She is not ill-appearing.      Comments: Kind and pleasant female, appears stated age and in NAD today   HENT:      Head: Normocephalic and atraumatic.      Right Ear: External ear normal.      Left Ear: External ear normal.   Eyes:      General:         Right eye: No discharge.         Left eye: No discharge.      Extraocular Movements: Extraocular movements intact.      Conjunctiva/sclera: Conjunctivae normal.   Neck:      Thyroid: No thyromegaly.      Vascular: No carotid bruit.      Comments: No thyromegaly or mass  Cardiovascular:      Rate and Rhythm: Normal rate and regular rhythm.      Pulses: Normal pulses.      Heart sounds: Normal heart sounds. No murmur heard.  Pulmonary:      Effort: Pulmonary effort is normal. No respiratory distress.      Breath sounds: Normal breath sounds. No wheezing.   Abdominal:      General: Bowel sounds are normal. There is no distension.      Palpations: Abdomen is soft.      Tenderness: There is no abdominal tenderness.   Musculoskeletal:         General: Normal range of motion.      Cervical back: Normal range of motion and neck supple.      Right lower leg: No edema.      Left lower leg: No edema.   Lymphadenopathy:      Cervical: No cervical adenopathy.   Skin:     General: Skin is warm.      Findings: No rash.   Neurological:      General: No focal deficit present.      Mental Status: She is alert and oriented to person, place, and time. Mental status is at baseline.      Cranial Nerves: No cranial nerve " deficit.      Motor: No weakness.      Coordination: Coordination normal.      Gait: Gait normal.   Psychiatric:         Mood and Affect: Mood normal.         Behavior: Behavior normal.         Thought Content: Thought content normal.         Judgment: Judgment normal.         Assessment & Plan   Carola Resendiz is here today and the following problems have been addressed:      Diagnoses and all orders for this visit:    1. Mixed hyperlipidemia (Primary)    2. Other specified hypothyroidism    3. Elevated blood sugar    4. Gastroesophageal reflux disease without esophagitis    5. Pulmonary emphysema, unspecified emphysema type (HCC)    Other orders  -     omeprazole (priLOSEC) 40 MG capsule; Take 1 capsule by mouth Daily.  Dispense: 90 capsule; Refill: 1  -     Fluzone High-Dose 65+yrs ([id number])    Follow heart healthy/low cholesterol diet, avoid excessive amounts of sweets  Avoid processed/fried foods/fast food  Encourage patient to start daily Metamucil to help with elevated cholesterol panel  Exercise as tolerated up to 30 minutes 5 days a week  Take all medications as prescribed  Resume omeprazole 40 mg daily, patient may try to alternate this with Pepcid every other day  Continue current dose of levothyroxine, recent TFTs in normal range  Follow-up with pulmonary in regard to COPD, continue Trelegy and use albuterol as needed  Flu vaccine provided today    Return to clinic in 6 months for Medicare wellness exam    Please note that portions of this note were completed with a voice recognition program.  Efforts were made to edit dictation, but occasionally words are mistranscribed.

## 2023-01-25 ENCOUNTER — TRANSCRIBE ORDERS (OUTPATIENT)
Dept: ADMINISTRATIVE | Facility: HOSPITAL | Age: 68
End: 2023-01-25
Payer: MEDICARE

## 2023-01-25 DIAGNOSIS — Z12.31 VISIT FOR SCREENING MAMMOGRAM: Primary | ICD-10-CM

## 2023-04-21 RX ORDER — OMEPRAZOLE 40 MG/1
40 CAPSULE, DELAYED RELEASE ORAL DAILY
Qty: 90 CAPSULE | Refills: 1 | Status: SHIPPED | OUTPATIENT
Start: 2023-04-21

## 2023-05-05 DIAGNOSIS — E03.8 OTHER SPECIFIED HYPOTHYROIDISM: ICD-10-CM

## 2023-05-05 RX ORDER — LEVOTHYROXINE SODIUM 88 UG/1
88 TABLET ORAL DAILY
Qty: 90 TABLET | Refills: 1 | Status: SHIPPED | OUTPATIENT
Start: 2023-05-05

## 2023-05-18 ENCOUNTER — OFFICE VISIT (OUTPATIENT)
Dept: INTERNAL MEDICINE | Facility: CLINIC | Age: 68
End: 2023-05-18
Payer: MEDICARE

## 2023-05-18 VITALS
BODY MASS INDEX: 28.89 KG/M2 | TEMPERATURE: 97.8 F | OXYGEN SATURATION: 96 % | SYSTOLIC BLOOD PRESSURE: 155 MMHG | DIASTOLIC BLOOD PRESSURE: 61 MMHG | HEART RATE: 77 BPM | RESPIRATION RATE: 18 BRPM | WEIGHT: 153 LBS | HEIGHT: 61 IN

## 2023-05-18 DIAGNOSIS — R39.9 URINARY SYMPTOM OR SIGN: ICD-10-CM

## 2023-05-18 DIAGNOSIS — R03.0 ELEVATED BLOOD PRESSURE READING: ICD-10-CM

## 2023-05-18 DIAGNOSIS — J01.40 ACUTE PANSINUSITIS, RECURRENCE NOT SPECIFIED: Primary | ICD-10-CM

## 2023-05-18 DIAGNOSIS — K21.9 GASTROESOPHAGEAL REFLUX DISEASE, UNSPECIFIED WHETHER ESOPHAGITIS PRESENT: ICD-10-CM

## 2023-05-18 DIAGNOSIS — K59.04 CHRONIC IDIOPATHIC CONSTIPATION: ICD-10-CM

## 2023-05-18 LAB
BILIRUB BLD-MCNC: NEGATIVE MG/DL
CLARITY, POC: CLEAR
COLOR UR: YELLOW
EXPIRATION DATE: NORMAL
GLUCOSE UR STRIP-MCNC: NEGATIVE MG/DL
KETONES UR QL: NEGATIVE
LEUKOCYTE EST, POC: NEGATIVE
Lab: NORMAL
NITRITE UR-MCNC: NEGATIVE MG/ML
PH UR: 5.5 [PH] (ref 5–8)
PROT UR STRIP-MCNC: NEGATIVE MG/DL
RBC # UR STRIP: NEGATIVE /UL
SP GR UR: 1.03 (ref 1–1.03)
UROBILINOGEN UR QL: NORMAL

## 2023-05-18 PROCEDURE — 1160F RVW MEDS BY RX/DR IN RCRD: CPT | Performed by: NURSE PRACTITIONER

## 2023-05-18 PROCEDURE — 81003 URINALYSIS AUTO W/O SCOPE: CPT | Performed by: NURSE PRACTITIONER

## 2023-05-18 PROCEDURE — 1159F MED LIST DOCD IN RCRD: CPT | Performed by: NURSE PRACTITIONER

## 2023-05-18 PROCEDURE — 99214 OFFICE O/P EST MOD 30 MIN: CPT | Performed by: NURSE PRACTITIONER

## 2023-05-18 RX ORDER — OMEPRAZOLE 40 MG/1
40 CAPSULE, DELAYED RELEASE ORAL DAILY
Qty: 90 CAPSULE | Refills: 1 | Status: SHIPPED | OUTPATIENT
Start: 2023-05-18

## 2023-05-18 RX ORDER — AMOXICILLIN AND CLAVULANATE POTASSIUM 875; 125 MG/1; MG/1
1 TABLET, FILM COATED ORAL 2 TIMES DAILY
Qty: 20 TABLET | Refills: 0 | Status: SHIPPED | OUTPATIENT
Start: 2023-05-18 | End: 2023-05-28

## 2023-05-18 RX ORDER — LORATADINE 10 MG/1
10 TABLET ORAL DAILY
Qty: 30 TABLET | Refills: 1 | Status: SHIPPED | OUTPATIENT
Start: 2023-05-18

## 2023-05-18 NOTE — PROGRESS NOTES
Chief Complaint / Reason:      Chief Complaint   Patient presents with   • Urinary Tract Infection     Re est care, sinus issues        Subjective     HPI  Patient presents today with multiple malaise which includes UTI symptoms and sinus pressure.  She states that her urinary symptoms include pressure and frequency she does have a history of constipation.  Denies fever or chills.  In regards to her sinus issues she states that her symptoms have been going on for a month and she does have seasonal allergies and has been taking allergy medicine but over the past week or so her symptoms have gotten worse.   Patient's blood pressure is elevated but she denies chest pain, shortness of breath or heart palpitations.  History taken from: patient    PMH/FH/Social History were reviewed and updated appropriately in the electronic medical record.   Past Medical History:   Diagnosis Date   • Abdominal pain, RUQ    • Acute bronchitis    • Atypical mole    • COPD (chronic obstructive pulmonary disease)    • Cough    • Diarrhea    • Disease of thyroid gland    • Edema    • Elevated blood pressure reading without diagnosis of hypertension    • Esophageal reflux    • Fever    • Hematochezia    • Labyrinthitis, acute    • Low back pain    • Peptic ulcer    • Shortness of breath    • Sinusitis    • Urinary frequency    • UTI (urinary tract infection)      Past Surgical History:   Procedure Laterality Date   • APPENDECTOMY     • BREAST CYST ASPIRATION Right     Years ago   • BREAST CYST EXCISION Right    • BREAST SURGERY Right     Fibroid removed from breast    • ENDOSCOPY N/A 6/11/2020    Procedure: ESOPHAGOGASTRODUODENOSCOPY WITH BIOPSY;  Surgeon: Catalino Cuevas MD;  Location: [facility] ENDOSCOPY;  Service: Gastroenterology;  Laterality: N/A;   • HYSTERECTOMY      Partial - unsure of when     Social History     Socioeconomic History   • Marital status:    Tobacco Use   • Smoking status: Former     Packs/day: 1.50     Years:  25.00     Pack years: 37.50     Types: Cigarettes     Quit date: 12/3/2013     Years since quittin.4   • Smokeless tobacco: Never   Vaping Use   • Vaping Use: Never used   Substance and Sexual Activity   • Alcohol use: No   • Drug use: Never   • Sexual activity: Defer     Family History   Problem Relation Age of Onset   • Emphysema Mother    • Heart attack Father    • Prostate cancer Father    • Breast cancer Neg Hx    • Ovarian cancer Neg Hx        Review of Systems:   Review of Systems      All other systems were reviewed and are negative.  Exceptions are noted in the subjective or above.      Objective     Vital Signs  Vitals:    23 1633   BP: 155/61   Pulse: 77   Resp: 18   Temp: 97.8 °F (36.6 °C)   SpO2: 96%       Body mass index is 28.91 kg/m².    Physical Exam  Vitals and nursing note reviewed.   Constitutional:       General: She is not in acute distress.     Appearance: She is well-developed.   HENT:      Head: Normocephalic and atraumatic.      Right Ear: Ear canal and external ear normal. Tympanic membrane is erythematous and bulging.      Left Ear: Ear canal and external ear normal. Tympanic membrane is erythematous and bulging.      Nose: Mucosal edema, congestion and rhinorrhea present.      Right Sinus: Maxillary sinus tenderness and frontal sinus tenderness present.      Left Sinus: Maxillary sinus tenderness and frontal sinus tenderness present.      Mouth/Throat:      Mouth: Mucous membranes are dry.      Pharynx: Posterior oropharyngeal erythema present. No oropharyngeal exudate.   Eyes:      Conjunctiva/sclera: Conjunctivae normal.      Right eye: Right conjunctiva is not injected.      Left eye: Left conjunctiva is not injected.      Pupils: Pupils are equal, round, and reactive to light.   Cardiovascular:      Rate and Rhythm: Normal rate and regular rhythm.      Pulses: Normal pulses.      Heart sounds: Normal heart sounds.   Pulmonary:      Effort: Pulmonary effort is normal. No  respiratory distress.      Breath sounds: Normal breath sounds.   Lymphadenopathy:      Head:      Right side of head: No submental, submandibular or tonsillar adenopathy.      Left side of head: No submental, submandibular or tonsillar adenopathy.      Cervical: No cervical adenopathy.   Skin:     General: Skin is warm and dry.      Capillary Refill: Capillary refill takes less than 2 seconds.   Neurological:      Mental Status: She is alert and oriented to person, place, and time.              Results Review:    I reviewed the patient's new clinical results.     Office Visit on 05/18/2023   Component Date Value Ref Range Status   • Color 05/18/2023 Yellow  Yellow, Straw, Dark Yellow, Tamara Final   • Clarity, UA 05/18/2023 Clear  Clear Final   • Specific Gravity  05/18/2023 1.030  1.005 - 1.030 Final   • pH, Urine 05/18/2023 5.5  5.0 - 8.0 Final   • Leukocytes 05/18/2023 Negative  Negative Final   • Nitrite, UA 05/18/2023 Negative  Negative Final   • Protein, POC 05/18/2023 Negative  Negative mg/dL Final   • Glucose, UA 05/18/2023 Negative  Negative mg/dL Final   • Ketones, UA 05/18/2023 Negative  Negative Final   • Urobilinogen, UA 05/18/2023 Normal  Normal, 0.2 E.U./dL Final   • Bilirubin 05/18/2023 Negative  Negative Final   • Blood, UA 05/18/2023 Negative  Negative Final   • Lot Number 05/18/2023 [id number]   Final   • Expiration Date 05/18/2023 3/25/24   Final       Medication Review:     Current Outpatient Medications:   •  albuterol sulfate  (90 Base) MCG/ACT inhaler, Two puffs per day every 4-6 hours as needed for wheeze or shortness of breath., Disp: 18 g, Rfl: 0  •  azelastine (ASTELIN) 0.1 % nasal spray, 1 spray into the nostril(s) as directed by provider 2 (Two) Times a Day As Needed for Rhinitis or Allergies. Use in each nostril as directed, Disp: 1 each, Rfl: 5  •  Fluticasone-Umeclidin-Vilant (Trelegy Ellipta) 200-62.5-25 MCG/INH inhaler, Inhale 1 puff Daily., Disp: 60 each, Rfl: 5  •   ipratropium (ATROVENT) 0.02 % nebulizer solution, Take 2.5 mL by nebulization 4 (Four) Times a Day., Disp: 250 mL, Rfl: 0  •  ipratropium (ATROVENT) 0.03 % nasal spray, 2 sprays into the nostril(s) as directed by provider Every 12 (Twelve) Hours., Disp: 60 mL, Rfl: 0  •  ipratropium-albuterol (DUO-NEB) 0.5-2.5 mg/3 ml nebulizer, Nebulize q 4 h prn wheezing / shortness of breath, Disp: 360 mL, Rfl: 0  •  levothyroxine (SYNTHROID, LEVOTHROID) 88 MCG tablet, Take 1 tablet by mouth Daily., Disp: 90 tablet, Rfl: 1  •  omeprazole (priLOSEC) 40 MG capsule, Take 1 capsule by mouth Daily., Disp: 90 capsule, Rfl: 1  •  amoxicillin-clavulanate (Augmentin) 875-125 MG per tablet, Take 1 tablet by mouth 2 (Two) Times a Day for 10 days., Disp: 20 tablet, Rfl: 0  •  loratadine (Claritin) 10 MG tablet, Take 1 tablet by mouth Daily., Disp: 30 tablet, Rfl: 1    Diagnoses and all orders for this visit:    Acute pansinusitis, recurrence not specified  -     loratadine (Claritin) 10 MG tablet; Take 1 tablet by mouth Daily.  -     amoxicillin-clavulanate (Augmentin) 875-125 MG per tablet; Take 1 tablet by mouth 2 (Two) Times a Day for 10 days.  Discussed home care instructions and prevention  Urinary symptom or sign  -     POCT urinalysis dipstick, automated  Recommend increasing water intake and avoid changes in bowel habits  Elevated blood pressure reading  Recommend close monitor blood pressure at home and contact office if blood pressure remains elevated  Gastroesophageal reflux disease, unspecified whether esophagitis present  -     omeprazole (priLOSEC) 40 MG capsule; Take 1 capsule by mouth Daily.  Stable without worsening signs and symptoms.  Chronic idiopathic constipation    Discussed dietary modifications along with advised patient to take Colace daily.      Return if symptoms worsen or fail to improve.    Kari Sterling, APRN  05/18/2023

## 2023-05-22 ENCOUNTER — OFFICE VISIT (OUTPATIENT)
Dept: PULMONOLOGY | Facility: CLINIC | Age: 68
End: 2023-05-22
Payer: MEDICARE

## 2023-05-22 VITALS
HEIGHT: 61 IN | DIASTOLIC BLOOD PRESSURE: 68 MMHG | BODY MASS INDEX: 28.32 KG/M2 | SYSTOLIC BLOOD PRESSURE: 118 MMHG | RESPIRATION RATE: 16 BRPM | WEIGHT: 150 LBS | HEART RATE: 61 BPM | OXYGEN SATURATION: 96 %

## 2023-05-22 DIAGNOSIS — R06.02 SHORTNESS OF BREATH: Primary | ICD-10-CM

## 2023-05-22 DIAGNOSIS — R09.81 NASAL CONGESTION: ICD-10-CM

## 2023-05-22 DIAGNOSIS — Z87.09 HISTORY OF COPD: ICD-10-CM

## 2023-05-22 DIAGNOSIS — J44.9 CHRONIC OBSTRUCTIVE PULMONARY DISEASE, UNSPECIFIED COPD TYPE: Primary | ICD-10-CM

## 2023-05-22 DIAGNOSIS — Z87.891 PERSONAL HISTORY OF NICOTINE DEPENDENCE: ICD-10-CM

## 2023-05-22 DIAGNOSIS — J30.89 OTHER ALLERGIC RHINITIS: ICD-10-CM

## 2023-05-22 DIAGNOSIS — G47.34 NOCTURNAL HYPOXIA: ICD-10-CM

## 2023-05-22 PROCEDURE — 94726 PLETHYSMOGRAPHY LUNG VOLUMES: CPT | Performed by: INTERNAL MEDICINE

## 2023-05-22 PROCEDURE — 99214 OFFICE O/P EST MOD 30 MIN: CPT | Performed by: INTERNAL MEDICINE

## 2023-05-22 PROCEDURE — 94729 DIFFUSING CAPACITY: CPT | Performed by: INTERNAL MEDICINE

## 2023-05-22 PROCEDURE — 94060 EVALUATION OF WHEEZING: CPT | Performed by: INTERNAL MEDICINE

## 2023-05-22 RX ORDER — ALBUTEROL SULFATE 90 UG/1
AEROSOL, METERED RESPIRATORY (INHALATION)
Qty: 18 G | Refills: 3 | Status: SHIPPED | OUTPATIENT
Start: 2023-05-22

## 2023-05-22 RX ORDER — AZELASTINE 1 MG/ML
1 SPRAY, METERED NASAL 2 TIMES DAILY PRN
Qty: 1 EACH | Refills: 5 | Status: SHIPPED | OUTPATIENT
Start: 2023-05-22

## 2023-05-22 NOTE — PROGRESS NOTES
"  Chief Complaint   Patient presents with   • Shortness of Breath   • Follow-up       Subjective   Carola Resendiz is a 67 y.o. female.     History of Present Illness   Patient was evaluated today for follow up of shortness of breath, allergic rhinitis and COPD.     Patient says that her symptoms have been stable since the last clinic visit. she reports no recent exacerbations.     Patient is using Trelegy, as prescribed. Exercise tolerance has also remained stable.     Quit smoking 9 years ago.     Patient says that she has been using her nasal sprays on a seasonal basis and describes no significant ongoing issues other than occasional congestion.     The patient says that she is using oxygen as prescribed and feels that it appears to be helping some of her symptoms.      The following portions of the patient's history were reviewed and updated as appropriate: allergies, current medications, past family history, past medical history, past social history and past surgical history.    Review of Systems   HENT: Positive for sinus pressure. Negative for sneezing and sore throat.    Respiratory: Positive for cough. Negative for chest tightness, shortness of breath and wheezing.        Objective   Visit Vitals  /68   Pulse 61   Resp 16   Ht 154.9 cm (60.98\") Comment: pt reported   Wt 68 kg (150 lb)   SpO2 96%   BMI 28.36 kg/m²       Physical Exam  Vitals reviewed.   Constitutional:       Appearance: She is well-developed.   HENT:      Head: Normocephalic and atraumatic.   Eyes:      Extraocular Movements: Extraocular movements intact.   Cardiovascular:      Rate and Rhythm: Normal rate.   Pulmonary:      Comments: Somewhat hyperresonant to percussion.  Somewhat decreased air entry.  No obvious wheezing noted.   Musculoskeletal:      Cervical back: Neck supple.   Neurological:      Mental Status: She is alert.           Assessment & Plan   Diagnoses and all orders for this visit:    1. Chronic obstructive pulmonary " disease, unspecified COPD type (Primary)  -     Pulmonary Function Test    2. Other allergic rhinitis    3. Personal history of nicotine dependence  -      CT Chest Low Dose Cancer Screening WO; Future    4. Nocturnal hypoxia    5. Nasal congestion  -     albuterol sulfate  (90 Base) MCG/ACT inhaler; Two puffs per day every 4-6 hours as needed for wheeze or shortness of breath.  Dispense: 18 g; Refill: 3    6. History of COPD  -     albuterol sulfate  (90 Base) MCG/ACT inhaler; Two puffs per day every 4-6 hours as needed for wheeze or shortness of breath.  Dispense: 18 g; Refill: 3    Other orders  -     azelastine (ASTELIN) 0.1 % nasal spray; 1 spray into the nostril(s) as directed by provider 2 (Two) Times a Day As Needed for Rhinitis or Allergies. Use in each nostril as directed  Dispense: 1 each; Refill: 5  -     Fluticasone-Umeclidin-Vilant (Trelegy Ellipta) 200-62.5-25 MCG/ACT aerosol powder ; Inhale 1 puff Daily.  Dispense: 180 each; Refill: 2           Return in about 8 months (around 1/22/2024) for Recheck, Imaging, For [place] ([place]).    DISCUSSION (if any):  Last CT scan results was reviewed in great detail with the patient.  Results for orders placed during the hospital encounter of 09/29/22    CT Chest Without Contrast Diagnostic    Narrative  CT CHEST WITHOUT CONTRAST    HISTORY: Follow-up    COMPARISON: 03/29/2022, 11/05/2021    FINDINGS: Axial CT images of the chest were obtained without contrast.  Coronal reformatted images were also obtained. This study was performed  with techniques to keep radiation doses as low as reasonably achievable,  (ALARA). Individualized dose reduction techniques using automated  exposure control or adjustment of mA and/or kV according to the patient  size were employed.    There is no evidence of mediastinal or hilar mass or adenopathy. No  axillary mass or adenopathy is identified.. Mild emphysema Unchanged 1.6  cm right apical groundglass nodularity.  No focal consolidation. No chest  wall abnormality is identified. Limited images of the upper abdomen  reveal no mass or localized inflammatory process.    Impression  Stable 1.6 cm right apical groundglass nodularity. Consider  follow-up in 12 months.      This report was signed and finalized on 9/29/2022 3:50 PM by Concetta Fernandes MD.    Laboratory data was reviewed with her.   Lab Results   Component Value Date    AFPTM 147 02/08/2021     Lab Results   Component Value Date    PHENOTYPE MM 02/08/2021     Today's PFTs were reviewed.  Consistent with severe COPD.     We have reviewed her pulmonary medications in great detail.    Compliance with medications stressed.     Side effects of prescribed medications discussed with the patient    The patient belongs to the risk group for which lung cancer screening has been recommended. We will try to make arrangements for the same and this will be indicated in September 2023.  This has been ordered     Patient was advised to continue her nasal spray on a seasonal basis, since her symptoms are consistent with seasonal allergic rhinitis.    The patient was advised to continue using oxygen at night.    The patient says that she is up-to-date with influenza & pneumonia vaccinations.       Dictated utilizing Dragon dictation.    This document was electronically signed by Lenny Lin MD on 05/22/23 at 11:15 EDT

## 2023-07-12 ENCOUNTER — OFFICE VISIT (OUTPATIENT)
Dept: INTERNAL MEDICINE | Facility: CLINIC | Age: 68
End: 2023-07-12
Payer: MEDICARE

## 2023-07-12 VITALS
SYSTOLIC BLOOD PRESSURE: 151 MMHG | HEART RATE: 70 BPM | RESPIRATION RATE: 16 BRPM | DIASTOLIC BLOOD PRESSURE: 67 MMHG | WEIGHT: 151 LBS | HEIGHT: 61 IN | BODY MASS INDEX: 28.51 KG/M2 | TEMPERATURE: 97.9 F | OXYGEN SATURATION: 97 %

## 2023-07-12 DIAGNOSIS — R53.83 FATIGUE, UNSPECIFIED TYPE: ICD-10-CM

## 2023-07-12 DIAGNOSIS — E78.2 MIXED HYPERLIPIDEMIA: ICD-10-CM

## 2023-07-12 DIAGNOSIS — R03.0 ELEVATED BLOOD PRESSURE READING: ICD-10-CM

## 2023-07-12 DIAGNOSIS — Z00.00 ENCOUNTER FOR MEDICARE ANNUAL WELLNESS EXAM: Primary | ICD-10-CM

## 2023-07-12 DIAGNOSIS — E55.9 VITAMIN D INSUFFICIENCY: ICD-10-CM

## 2023-07-12 DIAGNOSIS — K21.9 GASTROESOPHAGEAL REFLUX DISEASE WITHOUT ESOPHAGITIS: ICD-10-CM

## 2023-07-12 DIAGNOSIS — R73.9 ELEVATED BLOOD SUGAR: ICD-10-CM

## 2023-07-12 DIAGNOSIS — E03.8 OTHER SPECIFIED HYPOTHYROIDISM: ICD-10-CM

## 2023-07-12 RX ORDER — FAMOTIDINE 20 MG/1
20 TABLET, FILM COATED ORAL 2 TIMES DAILY PRN
Qty: 60 TABLET | Refills: 1 | Status: SHIPPED | OUTPATIENT
Start: 2023-07-12

## 2023-07-15 LAB
25(OH)D3+25(OH)D2 SERPL-MCNC: 25.1 NG/ML (ref 30–100)
ALBUMIN SERPL-MCNC: 4.2 G/DL (ref 3.5–5.2)
ALBUMIN/GLOB SERPL: 1.5 G/DL
ALP SERPL-CCNC: 89 U/L (ref 39–117)
ALT SERPL-CCNC: 12 U/L (ref 1–33)
AST SERPL-CCNC: 15 U/L (ref 1–32)
BASOPHILS # BLD AUTO: 0.04 10*3/MM3 (ref 0–0.2)
BASOPHILS NFR BLD AUTO: 0.6 % (ref 0–1.5)
BILIRUB SERPL-MCNC: 0.3 MG/DL (ref 0–1.2)
BUN SERPL-MCNC: 13 MG/DL (ref 8–23)
BUN/CREAT SERPL: 17.3 (ref 7–25)
CALCIUM SERPL-MCNC: 9.9 MG/DL (ref 8.6–10.5)
CHLORIDE SERPL-SCNC: 106 MMOL/L (ref 98–107)
CHOLEST SERPL-MCNC: 219 MG/DL (ref 0–200)
CO2 SERPL-SCNC: 29.5 MMOL/L (ref 22–29)
CREAT SERPL-MCNC: 0.75 MG/DL (ref 0.57–1)
EGFRCR SERPLBLD CKD-EPI 2021: 86.8 ML/MIN/1.73
EOSINOPHIL # BLD AUTO: 0.33 10*3/MM3 (ref 0–0.4)
EOSINOPHIL NFR BLD AUTO: 4.6 % (ref 0.3–6.2)
ERYTHROCYTE [DISTWIDTH] IN BLOOD BY AUTOMATED COUNT: 12.1 % (ref 12.3–15.4)
GLOBULIN SER CALC-MCNC: 2.8 GM/DL
GLUCOSE SERPL-MCNC: 95 MG/DL (ref 65–99)
HBA1C MFR BLD: 5.7 % (ref 4.8–5.6)
HCT VFR BLD AUTO: 36.4 % (ref 34–46.6)
HDLC SERPL-MCNC: 73 MG/DL (ref 40–60)
HGB BLD-MCNC: 12.2 G/DL (ref 12–15.9)
IMM GRANULOCYTES # BLD AUTO: 0.01 10*3/MM3 (ref 0–0.05)
IMM GRANULOCYTES NFR BLD AUTO: 0.1 % (ref 0–0.5)
LDLC SERPL CALC-MCNC: 130 MG/DL (ref 0–100)
LYMPHOCYTES # BLD AUTO: 1.87 10*3/MM3 (ref 0.7–3.1)
LYMPHOCYTES NFR BLD AUTO: 26.2 % (ref 19.6–45.3)
MCH RBC QN AUTO: 30 PG (ref 26.6–33)
MCHC RBC AUTO-ENTMCNC: 33.5 G/DL (ref 31.5–35.7)
MCV RBC AUTO: 89.7 FL (ref 79–97)
MONOCYTES # BLD AUTO: 0.61 10*3/MM3 (ref 0.1–0.9)
MONOCYTES NFR BLD AUTO: 8.5 % (ref 5–12)
NEUTROPHILS # BLD AUTO: 4.28 10*3/MM3 (ref 1.7–7)
NEUTROPHILS NFR BLD AUTO: 60 % (ref 42.7–76)
NRBC BLD AUTO-RTO: 0 /100 WBC (ref 0–0.2)
PLATELET # BLD AUTO: 300 10*3/MM3 (ref 140–450)
POTASSIUM SERPL-SCNC: 5.3 MMOL/L (ref 3.5–5.2)
PROT SERPL-MCNC: 7 G/DL (ref 6–8.5)
RBC # BLD AUTO: 4.06 10*6/MM3 (ref 3.77–5.28)
SODIUM SERPL-SCNC: 143 MMOL/L (ref 136–145)
T4 SERPL-MCNC: 6.3 UG/DL (ref 4.5–12)
TRIGL SERPL-MCNC: 91 MG/DL (ref 0–150)
TSH SERPL DL<=0.005 MIU/L-ACNC: 0.13 UIU/ML (ref 0.27–4.2)
VIT B12 SERPL-MCNC: 293 PG/ML (ref 211–946)
VLDLC SERPL CALC-MCNC: 16 MG/DL (ref 5–40)
WBC # BLD AUTO: 7.14 10*3/MM3 (ref 3.4–10.8)

## 2023-07-31 DIAGNOSIS — E03.8 OTHER SPECIFIED HYPOTHYROIDISM: Primary | ICD-10-CM

## 2023-07-31 DIAGNOSIS — E53.8 B12 DEFICIENCY: Primary | ICD-10-CM

## 2023-07-31 RX ORDER — CYANOCOBALAMIN 1000 UG/ML
1000 INJECTION, SOLUTION INTRAMUSCULAR; SUBCUTANEOUS WEEKLY
Status: SHIPPED | OUTPATIENT
Start: 2023-07-31 | End: 2023-08-28

## 2023-07-31 RX ORDER — ERGOCALCIFEROL 1.25 MG/1
50000 CAPSULE ORAL WEEKLY
Qty: 12 CAPSULE | Refills: 0 | Status: SHIPPED | OUTPATIENT
Start: 2023-07-31

## 2023-07-31 RX ORDER — CYANOCOBALAMIN 1000 UG/ML
1000 INJECTION, SOLUTION INTRAMUSCULAR; SUBCUTANEOUS
Status: SHIPPED | OUTPATIENT
Start: 2023-09-01 | End: 2024-01-19

## 2023-07-31 RX ORDER — LEVOTHYROXINE SODIUM 0.05 MG/1
50 TABLET ORAL DAILY
Qty: 60 TABLET | Refills: 0 | Status: SHIPPED | OUTPATIENT
Start: 2023-07-31

## 2023-07-31 RX ORDER — CYANOCOBALAMIN 1000 UG/ML
1000 INJECTION, SOLUTION INTRAMUSCULAR; SUBCUTANEOUS WEEKLY
Status: DISCONTINUED | OUTPATIENT
Start: 2023-07-31 | End: 2023-07-31

## 2023-08-02 ENCOUNTER — CLINICAL SUPPORT (OUTPATIENT)
Dept: INTERNAL MEDICINE | Facility: CLINIC | Age: 68
End: 2023-08-02
Payer: MEDICARE

## 2023-08-02 DIAGNOSIS — E53.8 CYANOCOBALAMIN DEFICIENCY: Primary | ICD-10-CM

## 2023-08-02 PROCEDURE — 96372 THER/PROPH/DIAG INJ SC/IM: CPT | Performed by: NURSE PRACTITIONER

## 2023-08-02 RX ADMIN — CYANOCOBALAMIN 1000 MCG: 1000 INJECTION, SOLUTION INTRAMUSCULAR; SUBCUTANEOUS at 10:07

## 2023-08-09 ENCOUNTER — CLINICAL SUPPORT (OUTPATIENT)
Dept: INTERNAL MEDICINE | Facility: CLINIC | Age: 68
End: 2023-08-09
Payer: MEDICARE

## 2023-08-09 DIAGNOSIS — E53.8 B12 DEFICIENCY: Primary | ICD-10-CM

## 2023-08-09 PROCEDURE — 96372 THER/PROPH/DIAG INJ SC/IM: CPT | Performed by: NURSE PRACTITIONER

## 2023-08-09 RX ADMIN — CYANOCOBALAMIN 1000 MCG: 1000 INJECTION, SOLUTION INTRAMUSCULAR; SUBCUTANEOUS at 10:24

## 2023-08-16 ENCOUNTER — CLINICAL SUPPORT (OUTPATIENT)
Dept: INTERNAL MEDICINE | Facility: CLINIC | Age: 68
End: 2023-08-16
Payer: MEDICARE

## 2023-08-16 DIAGNOSIS — E53.8 B12 DEFICIENCY: Primary | ICD-10-CM

## 2023-08-16 PROCEDURE — 96372 THER/PROPH/DIAG INJ SC/IM: CPT | Performed by: NURSE PRACTITIONER

## 2023-08-16 RX ADMIN — CYANOCOBALAMIN 1000 MCG: 1000 INJECTION, SOLUTION INTRAMUSCULAR; SUBCUTANEOUS at 10:54

## 2023-08-23 ENCOUNTER — CLINICAL SUPPORT (OUTPATIENT)
Dept: INTERNAL MEDICINE | Facility: CLINIC | Age: 68
End: 2023-08-23
Payer: MEDICARE

## 2023-08-23 DIAGNOSIS — E53.8 B12 DEFICIENCY: Primary | ICD-10-CM

## 2023-08-23 PROCEDURE — 96372 THER/PROPH/DIAG INJ SC/IM: CPT | Performed by: NURSE PRACTITIONER

## 2023-08-23 RX ADMIN — CYANOCOBALAMIN 1000 MCG: 1000 INJECTION, SOLUTION INTRAMUSCULAR; SUBCUTANEOUS at 09:05

## 2023-09-05 ENCOUNTER — HOSPITAL ENCOUNTER (OUTPATIENT)
Dept: MAMMOGRAPHY | Facility: HOSPITAL | Age: 68
Discharge: HOME | End: 2023-09-05
Admitting: INTERNAL MEDICINE
Payer: MEDICARE

## 2023-09-05 DIAGNOSIS — Z12.31 VISIT FOR SCREENING MAMMOGRAM: ICD-10-CM

## 2023-09-05 PROCEDURE — 77067 SCR MAMMO BI INCL CAD: CPT

## 2023-09-05 PROCEDURE — 77063 BREAST TOMOSYNTHESIS BI: CPT

## 2023-09-07 NOTE — PROGRESS NOTES
epiPatient: Carola Resendiz    YOB: 1955    Date: 09/11/2023    Primary Care Provider: Kari Sterling APRN    Chief Complaint   Patient presents with    Hernia     hiatal hernia.       SUBJECTIVE:    History of present illness:  I saw the patient in the office  today as a consultation for evaluation and treatment of epigastric pain.  Patient had upper endoscopy performed 06/11/2020 which showed peptic ulcer disease and a small hiatal hernia.      She complains of sharp discomfort in the mid epigastric abdominal region and right upper quadrant.  This seems to be made worse after meals, she does have an excessive amount of belching.  She does have a history significant for a small hiatal hernia diagnosed at upper endoscopy in 2020, she states she had a previous tubular adenoma removed 3-4 years ago.    There is a history significant for left lower quadrant abdominal discomfort and excessive bloating after meals.    The following portions of the patient's history were reviewed and updated as appropriate: allergies, current medications, past family history, past medical history, past social history, past surgical history and problem list.    Review of Systems   Constitutional:  Negative for chills, fever and unexpected weight change.   HENT:  Negative for hearing loss, trouble swallowing and voice change.    Eyes:  Negative for visual disturbance.   Respiratory:  Negative for apnea, cough, chest tightness, shortness of breath and wheezing.    Cardiovascular:  Negative for chest pain, palpitations and leg swelling.   Gastrointestinal:  Negative for abdominal distention, abdominal pain, anal bleeding, blood in stool, constipation, diarrhea, nausea, rectal pain and vomiting.   Endocrine: Negative for cold intolerance and heat intolerance.   Genitourinary:  Negative for difficulty urinating, dysuria and flank pain.   Musculoskeletal:  Negative for back pain and gait problem.   Skin:  Negative for  color change, rash and wound.   Neurological:  Negative for dizziness, syncope, speech difficulty, weakness, light-headedness, numbness and headaches.   Hematological:  Negative for adenopathy. Does not bruise/bleed easily.   Psychiatric/Behavioral:  Negative for confusion. The patient is not nervous/anxious.      History:  Past Medical History:   Diagnosis Date    Abdominal pain, RUQ     Acute bronchitis     Atypical mole     COPD (chronic obstructive pulmonary disease)     Cough     Diarrhea     Disease of thyroid gland     Edema     Elevated blood pressure reading without diagnosis of hypertension     Esophageal reflux     Fever     Hematochezia     Labyrinthitis, acute     Low back pain     Peptic ulcer     Shortness of breath     Sinusitis     Urinary frequency     UTI (urinary tract infection)        Past Surgical History:   Procedure Laterality Date    APPENDECTOMY      BREAST CYST ASPIRATION Right     Years ago    BREAST CYST EXCISION Right     BREAST SURGERY Right     Fibroid removed from breast     ENDOSCOPY N/A 2020    Procedure: ESOPHAGOGASTRODUODENOSCOPY WITH BIOPSY;  Surgeon: Catalino Cuevas MD;  Location: [facility] ENDOSCOPY;  Service: Gastroenterology;  Laterality: N/A;    HYSTERECTOMY      Partial - unsure of when       Family History   Problem Relation Age of Onset    Emphysema Mother     Heart attack Father     Prostate cancer Father     Breast cancer Neg Hx     Ovarian cancer Neg Hx        Social History     Tobacco Use    Smoking status: Former     Packs/day: 1.50     Years: 25.00     Pack years: 37.50     Types: Cigarettes     Quit date: 12/3/2013     Years since quittin.7    Smokeless tobacco: Never   Vaping Use    Vaping Use: Never used   Substance Use Topics    Alcohol use: No    Drug use: Never       Allergies:  No Known Allergies    Medications:    Current Outpatient Medications:     albuterol sulfate  (90 Base) MCG/ACT inhaler, Two puffs per day every 4-6 hours as needed  "for wheeze or shortness of breath., Disp: 18 g, Rfl: 3    azelastine (ASTELIN) 0.1 % nasal spray, 1 spray into the nostril(s) as directed by provider 2 (Two) Times a Day As Needed for Rhinitis or Allergies. Use in each nostril as directed, Disp: 1 each, Rfl: 5    famotidine (Pepcid) 20 MG tablet, Take 1 tablet by mouth 2 (Two) Times a Day As Needed for Heartburn or Indigestion., Disp: 60 tablet, Rfl: 1    Fluticasone-Umeclidin-Vilant (Trelegy Ellipta) 200-62.5-25 MCG/ACT aerosol powder , Inhale 1 puff Daily., Disp: 180 each, Rfl: 2    ipratropium (ATROVENT) 0.02 % nebulizer solution, Take 2.5 mL by nebulization 4 (Four) Times a Day., Disp: 250 mL, Rfl: 0    ipratropium (ATROVENT) 0.03 % nasal spray, 2 sprays into the nostril(s) as directed by provider Every 12 (Twelve) Hours., Disp: 60 mL, Rfl: 0    ipratropium-albuterol (DUO-NEB) 0.5-2.5 mg/3 ml nebulizer, Nebulize q 4 h prn wheezing / shortness of breath, Disp: 360 mL, Rfl: 0    levothyroxine (Synthroid) 50 MCG tablet, Take 1 tablet by mouth Daily., Disp: 60 tablet, Rfl: 0    loratadine (Claritin) 10 MG tablet, Take 1 tablet by mouth Daily., Disp: 30 tablet, Rfl: 1    omeprazole (priLOSEC) 40 MG capsule, Take 1 capsule by mouth Daily., Disp: 90 capsule, Rfl: 1    Vitamin D, Ergocalciferol, 03396 units capsule, Take 1 capsule by mouth 1 (One) Time Per Week., Disp: 12 capsule, Rfl: 0    Current Facility-Administered Medications:     cyanocobalamin injection 1,000 mcg, 1,000 mcg, Intramuscular, Q28 Days, Kari Sterling, APRN    OBJECTIVE:    Vital Signs:   Vitals:    09/11/23 0951   BP: 142/80   Pulse: 67   Resp: 18   Temp: 98 °F (36.7 °C)   TempSrc: Temporal   SpO2: 96%   Weight: 70.1 kg (154 lb 9.6 oz)   Height: 154.9 cm (61\")       Physical Exam:   General Appearance:    Alert, cooperative, in no acute distress   Head:    Normocephalic, without obvious abnormality, atraumatic   Eyes:            Lids and lashes normal, conjunctivae and sclerae normal, no   " icterus, no pallor, corneas clear, PERRLA   Ears:    Ears appear intact with no abnormalities noted   Throat:   No oral lesions, no thrush, oral mucosa moist   Neck:   No adenopathy, supple, trachea midline, no thyromegaly, no   carotid bruit, no JVD   Lungs:     Clear to auscultation,respirations regular, even and                  unlabored    Heart:    Regular rhythm and normal rate, normal S1 and S2, no            murmur, no gallop, no rub, no click   Chest Wall:    No abnormalities observed   Abdomen:     Normal bowel sounds, no masses, no organomegaly, soft        non-tender, non-distended, no guarding, there is evidence of epigastric  tenderness, no peritoneal signs   Extremities:   Moves all extremities well, no edema, no cyanosis, no             redness   Pulses:   Pulses palpable and equal bilaterally   Skin:   No bleeding, bruising or rash   Lymph nodes:   No palpable adenopathy   Neurologic:   Cranial nerves 2 - 12 grossly intact, sensation intact     Results Review:   I reviewed the patient's new clinical results.  I reviewed the patient's new imaging results and agree with the interpretation.  I reviewed the patient's other test results and agree with the interpretation    Review of Systems was reviewed and confirmed as accurate as documented by the MA.    ASSESSMENT/PLAN:    1. Epigastric pain    2. Gastroesophageal reflux disease with esophagitis without hemorrhage    3. Adenomatous polyp of transverse colon        I did have a detailed and extensive discussion with the patient in the office and they understand that they need to undergo upper endoscopy. Full risks and benefits of operative versus nonoperative intervention were discussed with the patient and these include bleeding and esophageal injury. The patient understands, agrees, and wishes to proceed with the surgical treatment plan as mentioned above. The patient had no questions for me at the end of the discussion.       I discussed the  patients findings and my recommendations with patient.     She needs to have a gallbladder ultrasound performed today, if this is negative I would like to order a HIDA scan.  She very well may require future colonoscopy.    Electronically signed by Catalino Cuevas MD  09/11/23 16:21 EDT

## 2023-09-11 ENCOUNTER — OFFICE VISIT (OUTPATIENT)
Dept: SURGERY | Facility: CLINIC | Age: 68
End: 2023-09-11
Payer: MEDICARE

## 2023-09-11 VITALS
HEIGHT: 61 IN | HEART RATE: 67 BPM | BODY MASS INDEX: 29.19 KG/M2 | WEIGHT: 154.6 LBS | OXYGEN SATURATION: 96 % | DIASTOLIC BLOOD PRESSURE: 80 MMHG | TEMPERATURE: 98 F | SYSTOLIC BLOOD PRESSURE: 142 MMHG | RESPIRATION RATE: 18 BRPM

## 2023-09-11 DIAGNOSIS — D12.3 ADENOMATOUS POLYP OF TRANSVERSE COLON: ICD-10-CM

## 2023-09-11 DIAGNOSIS — R10.13 EPIGASTRIC PAIN: Primary | ICD-10-CM

## 2023-09-11 DIAGNOSIS — K21.00 GASTROESOPHAGEAL REFLUX DISEASE WITH ESOPHAGITIS WITHOUT HEMORRHAGE: ICD-10-CM

## 2023-09-11 PROCEDURE — 1160F RVW MEDS BY RX/DR IN RCRD: CPT | Performed by: SURGERY

## 2023-09-11 PROCEDURE — 1159F MED LIST DOCD IN RCRD: CPT | Performed by: SURGERY

## 2023-09-11 PROCEDURE — 99214 OFFICE O/P EST MOD 30 MIN: CPT | Performed by: SURGERY

## 2023-09-18 ENCOUNTER — TELEPHONE (OUTPATIENT)
Dept: SURGERY | Facility: CLINIC | Age: 68
End: 2023-09-18
Payer: MEDICARE

## 2023-09-18 DIAGNOSIS — J44.9 CHRONIC OBSTRUCTIVE PULMONARY DISEASE, UNSPECIFIED COPD TYPE: Primary | ICD-10-CM

## 2023-09-20 ENCOUNTER — TELEPHONE (OUTPATIENT)
Dept: SURGERY | Facility: CLINIC | Age: 68
End: 2023-09-20
Payer: MEDICARE

## 2023-09-20 NOTE — TELEPHONE ENCOUNTER
Carola called this afternoon stating that she has strep throat and needs to R/s her EGD for 09/22. I let her know you would be giving her a call to go over a new date & I have CX her apt for 09/22. Thank you.

## 2023-09-20 NOTE — TELEPHONE ENCOUNTER
Pt called the office, she stated that she is sick at this time and she wishes to cancel EGD scheduled for 09/22, she will call back to reschedule asap.

## 2023-09-28 RX ORDER — LEVOTHYROXINE SODIUM 0.05 MG/1
50 TABLET ORAL DAILY
Qty: 90 TABLET | Refills: 0 | Status: SHIPPED | OUTPATIENT
Start: 2023-09-28

## 2023-09-28 RX ORDER — LEVOTHYROXINE SODIUM 0.05 MG/1
50 TABLET ORAL DAILY
Qty: 60 TABLET | Refills: 0 | Status: SHIPPED | OUTPATIENT
Start: 2023-09-28 | End: 2023-09-28

## 2023-10-17 ENCOUNTER — TELEPHONE (OUTPATIENT)
Dept: SURGERY | Facility: CLINIC | Age: 68
End: 2023-10-17

## 2023-10-17 NOTE — TELEPHONE ENCOUNTER
Called patient concerning EGD she canceled with Dr Cuevas. Patient stated she didn't want to reschedule due to Dr Cuevas not being par with her insurance.

## 2023-10-23 RX ORDER — ERGOCALCIFEROL 1.25 MG/1
50000 CAPSULE ORAL WEEKLY
Qty: 12 CAPSULE | Refills: 0 | OUTPATIENT
Start: 2023-10-23

## 2023-11-01 ENCOUNTER — TELEPHONE (OUTPATIENT)
Dept: PULMONOLOGY | Facility: CLINIC | Age: 68
End: 2023-11-01
Payer: MEDICARE

## 2023-11-01 NOTE — TELEPHONE ENCOUNTER
Called to see pt plan to have her ct scan done she stated at this time no because [facility] is no longer in network with [employer] insurance.

## 2023-11-11 DIAGNOSIS — K21.9 GASTROESOPHAGEAL REFLUX DISEASE, UNSPECIFIED WHETHER ESOPHAGITIS PRESENT: ICD-10-CM

## 2023-11-13 RX ORDER — OMEPRAZOLE 40 MG/1
40 CAPSULE, DELAYED RELEASE ORAL DAILY
Qty: 90 CAPSULE | Refills: 1 | Status: SHIPPED | OUTPATIENT
Start: 2023-11-13

## 2023-11-29 ENCOUNTER — PATIENT OUTREACH (OUTPATIENT)
Dept: ONCOLOGY | Facility: HOSPITAL | Age: 68
End: 2023-11-29
Payer: MEDICARE

## 2023-12-06 ENCOUNTER — TELEPHONE (OUTPATIENT)
Dept: PULMONOLOGY | Facility: CLINIC | Age: 68
End: 2023-12-06
Payer: MEDICARE

## 2023-12-06 NOTE — TELEPHONE ENCOUNTER
Called to see if pt plans to have ct scan that was ordered. Pt stated not right now since she is still with [employer]. Her new insurance doesn't go in affect till the beginning of the year.

## 2024-01-02 ENCOUNTER — TELEPHONE (OUTPATIENT)
Dept: PULMONOLOGY | Facility: CLINIC | Age: 69
End: 2024-01-02

## 2024-01-02 NOTE — TELEPHONE ENCOUNTER
Provider: HERMINIO HERNANDEZ     Caller: STEVEN RODRÍGUEZ    Relationship to Patient: SELF     Phone Number: [phone]     Reason for Call: THE PT WOULD LIKE TO SPEAK WITH SOMEONE ABOUT A REFERRAL FOR A LUNG SCAN. SHE HAS A REFERRAL FOR A PFT FROM SEPT.

## 2024-01-05 ENCOUNTER — TELEPHONE (OUTPATIENT)
Dept: SURGERY | Facility: CLINIC | Age: 69
End: 2024-01-05

## 2024-01-05 RX ORDER — LEVOTHYROXINE SODIUM 0.05 MG/1
50 TABLET ORAL DAILY
Qty: 90 TABLET | Refills: 0 | Status: SHIPPED | OUTPATIENT
Start: 2024-01-05

## 2024-01-05 NOTE — TELEPHONE ENCOUNTER
Patient on 3 year recall for colonoscopy. Called patient  no answer, left voice message for patient to call office. Recall letter mailed to patient.

## 2024-01-11 ENCOUNTER — OFFICE VISIT (OUTPATIENT)
Dept: INTERNAL MEDICINE | Facility: CLINIC | Age: 69
End: 2024-01-11
Payer: MEDICARE

## 2024-01-11 VITALS
WEIGHT: 152 LBS | OXYGEN SATURATION: 96 % | RESPIRATION RATE: 16 BRPM | BODY MASS INDEX: 28.7 KG/M2 | SYSTOLIC BLOOD PRESSURE: 156 MMHG | DIASTOLIC BLOOD PRESSURE: 68 MMHG | HEART RATE: 73 BPM | TEMPERATURE: 97.7 F | HEIGHT: 61 IN

## 2024-01-11 DIAGNOSIS — E03.8 OTHER SPECIFIED HYPOTHYROIDISM: ICD-10-CM

## 2024-01-11 DIAGNOSIS — E53.8 B12 DEFICIENCY: Primary | ICD-10-CM

## 2024-01-11 DIAGNOSIS — J22 LOWER RESPIRATORY INFECTION (E.G., BRONCHITIS, PNEUMONIA, PNEUMONITIS, PULMONITIS): ICD-10-CM

## 2024-01-11 PROCEDURE — 1159F MED LIST DOCD IN RCRD: CPT | Performed by: NURSE PRACTITIONER

## 2024-01-11 PROCEDURE — 99213 OFFICE O/P EST LOW 20 MIN: CPT | Performed by: NURSE PRACTITIONER

## 2024-01-11 PROCEDURE — 1160F RVW MEDS BY RX/DR IN RCRD: CPT | Performed by: NURSE PRACTITIONER

## 2024-01-11 RX ORDER — LEVOTHYROXINE SODIUM 0.05 MG/1
50 TABLET ORAL DAILY
Qty: 90 TABLET | Refills: 1 | Status: SHIPPED | OUTPATIENT
Start: 2024-01-11

## 2024-01-11 RX ORDER — DOXYCYCLINE 100 MG/1
100 TABLET ORAL 2 TIMES DAILY
Qty: 20 TABLET | Refills: 0 | OUTPATIENT
Start: 2024-01-11 | End: 2024-01-18

## 2024-01-12 LAB
TSH SERPL DL<=0.005 MIU/L-ACNC: 28.7 UIU/ML (ref 0.27–4.2)
VIT B12 SERPL-MCNC: 377 PG/ML (ref 211–946)

## 2024-01-12 NOTE — PROGRESS NOTES
Chief Complaint / Reason:      Chief Complaint   Patient presents with    Med Refill       Subjective     HPI  History taken from: patient    The patient is a 68-year-old female who presents to the clinic for an evaluation of multiple medical concerns.    She has experienced a cough for 2 to 3 days. She coughs up clear phlegm occasionally. She denies any fever or chills. She works part-time. She has had bronchitis 1 time in the past. She experiences wheezing when she lies down.    She could not tell a difference in her energy or memory with the thyroid medication. She still can not remember things. She is not taking vitamin D.    She was receiving her B12 injections 1 time a week, but had to stop because [employer] would not pay for it. She has an appointment on 01/27/2024 to see Tasha.     She is not allergic to any antibiotics.    PMH/FH/Social History were reviewed and updated appropriately in the electronic medical record.   Past Medical History:   Diagnosis Date    Abdominal pain, RUQ     Acute bronchitis     Atypical mole     COPD (chronic obstructive pulmonary disease)     Cough     Diarrhea     Disease of thyroid gland     Edema     Elevated blood pressure reading without diagnosis of hypertension     Esophageal reflux     Fever     Hematochezia     Labyrinthitis, acute     Low back pain     Peptic ulcer     Shortness of breath     Sinusitis     Urinary frequency     UTI (urinary tract infection)      Past Surgical History:   Procedure Laterality Date    APPENDECTOMY      BREAST CYST ASPIRATION Right     Years ago    BREAST CYST EXCISION Right     BREAST SURGERY Right     Fibroid removed from breast     ENDOSCOPY N/A 06/11/2020    Procedure: ESOPHAGOGASTRODUODENOSCOPY WITH BIOPSY;  Surgeon: Catalino Cuevas MD;  Location: [facility] ENDOSCOPY;  Service: Gastroenterology;  Laterality: N/A;    HYSTERECTOMY      Partial - unsure of when     Social History     Socioeconomic History    Marital status:     Tobacco Use    Smoking status: Former     Packs/day: 1.50     Years: 25.00     Additional pack years: 0.00     Total pack years: 37.50     Types: Cigarettes     Quit date: 12/3/2013     Years since quitting: 10.1    Smokeless tobacco: Never   Vaping Use    Vaping Use: Never used   Substance and Sexual Activity    Alcohol use: No    Drug use: Never    Sexual activity: Defer     Family History   Problem Relation Age of Onset    Emphysema Mother     Heart attack Father     Prostate cancer Father     Breast cancer Neg Hx     Ovarian cancer Neg Hx        Review of Systems:   Review of Systems      All other systems were reviewed and are negative.  Exceptions are noted in the subjective or above.      Objective     Vital Signs  Vitals:    01/11/24 1720   BP: 156/68   Pulse: 73   Resp: 16   Temp: 97.7 °F (36.5 °C)   SpO2: 96%       Body mass index is 28.72 kg/m².  BMI is >= 25 and <30. (Overweight) The following options were offered after discussion;: exercise counseling/recommendations and nutrition counseling/recommendations       Physical Exam  Vitals and nursing note reviewed.   Constitutional:       General: She is not in acute distress.     Appearance: She is well-developed.   HENT:      Head: Normocephalic and atraumatic.      Right Ear: Ear canal and external ear normal. Tympanic membrane is erythematous and bulging.      Left Ear: Ear canal and external ear normal. Tympanic membrane is erythematous and bulging.      Nose: Nasal tenderness, mucosal edema and congestion present. No rhinorrhea.      Right Sinus: No maxillary sinus tenderness or frontal sinus tenderness.      Left Sinus: No maxillary sinus tenderness or frontal sinus tenderness.      Mouth/Throat:      Mouth: Mucous membranes are dry.      Pharynx: Posterior oropharyngeal erythema present.      Comments: PND    Eyes:      Conjunctiva/sclera: Conjunctivae normal.   Cardiovascular:      Rate and Rhythm: Normal rate and regular rhythm.      Heart  sounds: Normal heart sounds.   Pulmonary:      Effort: Pulmonary effort is normal. No respiratory distress.      Breath sounds: Decreased breath sounds and wheezing present.   Abdominal:      General: Bowel sounds are normal.      Palpations: Abdomen is soft.   Lymphadenopathy:      Head:      Right side of head: No submental, submandibular or tonsillar adenopathy.      Left side of head: No submental, submandibular or tonsillar adenopathy.      Cervical: No cervical adenopathy.   Skin:     General: Skin is warm and dry.      Capillary Refill: Capillary refill takes less than 2 seconds.      Findings: No rash.   Neurological:      Mental Status: She is alert and oriented to person, place, and time.   Psychiatric:         Behavior: Behavior normal.         Thought Content: Thought content normal.         Judgment: Judgment normal.         Diminished breath sounds.     Results Review:    I reviewed the patient's new clinical results.   Imaging  Lung cancer screening from 2023 was reviewed with the patient.    Medication Review:     Current Outpatient Medications:     albuterol sulfate  (90 Base) MCG/ACT inhaler, Two puffs per day every 4-6 hours as needed for wheeze or shortness of breath., Disp: 18 g, Rfl: 3    Fluticasone-Umeclidin-Vilant (Trelegy Ellipta) 200-62.5-25 MCG/ACT aerosol powder , Inhale 1 puff Daily., Disp: 180 each, Rfl: 3    ipratropium (ATROVENT) 0.02 % nebulizer solution, Take 2.5 mL by nebulization 4 (Four) Times a Day., Disp: 250 mL, Rfl: 0    ipratropium-albuterol (DUO-NEB) 0.5-2.5 mg/3 ml nebulizer, Nebulize q 4 h prn wheezing / shortness of breath, Disp: 360 mL, Rfl: 0    levothyroxine (SYNTHROID, LEVOTHROID) 50 MCG tablet, Take 1 tablet by mouth Daily., Disp: 90 tablet, Rfl: 1    omeprazole (priLOSEC) 40 MG capsule, TAKE 1 CAPSULE BY MOUTH DAILY, Disp: 90 capsule, Rfl: 1    doxycycline (ADOXA) 100 MG tablet, Take 1 tablet by mouth 2 (Two) Times a Day for 10 days., Disp: 20 tablet, Rfl:  0    Current Facility-Administered Medications:     cyanocobalamin injection 1,000 mcg, 1,000 mcg, Intramuscular, Q28 Days, Kari Sterling APRN    Assessment & Plan   Diagnoses and all orders for this visit:    1. B12 deficiency (Primary)  -     Vitamin B12    2. Other specified hypothyroidism  -     levothyroxine (SYNTHROID, LEVOTHROID) 50 MCG tablet; Take 1 tablet by mouth Daily.  Dispense: 90 tablet; Refill: 1  -     TSH    3. Lower respiratory infection (e.g., bronchitis, pneumonia, pneumonitis, pulmonitis)  doxycycline (ADOXA) 100 MG tablet; Take 1 tablet by mouth 2 (Two) Times a Day for 10 days.  Dispense: 20 tablet; Refill: 0    Plan:    1. Bronchitis.  I will send in a prescription for antibiotics. She was advised to cough and deep breathe and not get pneumonia. She was advised to drink plenty of water to help thin the secretions inside her mouth.    2. Thyroid issues.  Her thyroid medication was refilled. I will recheck her TSH.    3. Low vitamin B12.   She was advised to eat foods high in iron. I will recheck her vitamin B12.    4. Vitamin D deficiency.  She was recommended to take vitamin D 4000 International Units daily in the winter time.    5. Lung cancer screening.  She is due for her lung cancer screening. She was recommended to get a lung cancer screening. I will place an order for a lung cancer screening.      Return if symptoms worsen or fail to improve.    MITCHEL Ortiz   01/11/2024      Transcribed from ambient dictation for MITCHEL Ortiz by Diane Swanson.  01/11/24   19:18 EST    Patient or patient representative verbalized consent to the visit recording.  I have personally performed the services described in this document as transcribed by the above individual, and it is both accurate and complete.

## 2024-01-15 DIAGNOSIS — E03.8 OTHER SPECIFIED HYPOTHYROIDISM: Primary | ICD-10-CM

## 2024-01-15 NOTE — PROGRESS NOTES
Please contact patient and let her know that TSH is elevated significantly and find out if she missed any dosage of her thyroid as it was low before and now it is significantly high please advise patient to take on empty stomach and recommend repeat in 8 weeks after she has been taking it daily without any missed doses also inquire about her taking biotin.  Please let me know if I need to put order in for TSH

## 2024-01-22 ENCOUNTER — LAB (OUTPATIENT)
Dept: LAB | Facility: HOSPITAL | Age: 69
End: 2024-01-22
Payer: MEDICARE

## 2024-01-22 ENCOUNTER — OFFICE VISIT (OUTPATIENT)
Dept: PULMONOLOGY | Facility: CLINIC | Age: 69
End: 2024-01-22
Payer: MEDICARE

## 2024-01-22 VITALS
BODY MASS INDEX: 28.34 KG/M2 | WEIGHT: 154 LBS | OXYGEN SATURATION: 96 % | HEIGHT: 62 IN | SYSTOLIC BLOOD PRESSURE: 124 MMHG | RESPIRATION RATE: 14 BRPM | DIASTOLIC BLOOD PRESSURE: 78 MMHG | HEART RATE: 79 BPM

## 2024-01-22 DIAGNOSIS — J30.89 OTHER ALLERGIC RHINITIS: ICD-10-CM

## 2024-01-22 DIAGNOSIS — Z87.891 PERSONAL HISTORY OF NICOTINE DEPENDENCE: Primary | ICD-10-CM

## 2024-01-22 DIAGNOSIS — J30.9 ALLERGIC RHINITIS, UNSPECIFIED SEASONALITY, UNSPECIFIED TRIGGER: ICD-10-CM

## 2024-01-22 DIAGNOSIS — G47.34 NOCTURNAL HYPOXIA: ICD-10-CM

## 2024-01-22 DIAGNOSIS — J44.1 COPD WITH ACUTE EXACERBATION: ICD-10-CM

## 2024-01-22 LAB
BASOPHILS # BLD AUTO: 0.03 10*3/MM3 (ref 0–0.2)
BASOPHILS NFR BLD AUTO: 0.2 % (ref 0–1.5)
DEPRECATED RDW RBC AUTO: 38.5 FL (ref 37–54)
EOSINOPHIL # BLD AUTO: 0.05 10*3/MM3 (ref 0–0.4)
EOSINOPHIL NFR BLD AUTO: 0.3 % (ref 0.3–6.2)
ERYTHROCYTE [DISTWIDTH] IN BLOOD BY AUTOMATED COUNT: 12 % (ref 12.3–15.4)
HCT VFR BLD AUTO: 36.4 % (ref 34–46.6)
HGB BLD-MCNC: 12.1 G/DL (ref 12–15.9)
IMM GRANULOCYTES # BLD AUTO: 0.05 10*3/MM3 (ref 0–0.05)
IMM GRANULOCYTES NFR BLD AUTO: 0.3 % (ref 0–0.5)
LYMPHOCYTES # BLD AUTO: 2.21 10*3/MM3 (ref 0.7–3.1)
LYMPHOCYTES NFR BLD AUTO: 14.3 % (ref 19.6–45.3)
MCH RBC QN AUTO: 30 PG (ref 26.6–33)
MCHC RBC AUTO-ENTMCNC: 33.2 G/DL (ref 31.5–35.7)
MCV RBC AUTO: 90.1 FL (ref 79–97)
MONOCYTES # BLD AUTO: 0.73 10*3/MM3 (ref 0.1–0.9)
MONOCYTES NFR BLD AUTO: 4.7 % (ref 5–12)
NEUTROPHILS NFR BLD AUTO: 12.4 10*3/MM3 (ref 1.7–7)
NEUTROPHILS NFR BLD AUTO: 80.2 % (ref 42.7–76)
NRBC BLD AUTO-RTO: 0 /100 WBC (ref 0–0.2)
PLATELET # BLD AUTO: 368 10*3/MM3 (ref 140–450)
PMV BLD AUTO: 10.6 FL (ref 6–12)
RBC # BLD AUTO: 4.04 10*6/MM3 (ref 3.77–5.28)
WBC NRBC COR # BLD AUTO: 15.47 10*3/MM3 (ref 3.4–10.8)

## 2024-01-22 PROCEDURE — 86003 ALLG SPEC IGE CRUDE XTRC EA: CPT

## 2024-01-22 PROCEDURE — 96372 THER/PROPH/DIAG INJ SC/IM: CPT | Performed by: NURSE PRACTITIONER

## 2024-01-22 PROCEDURE — 85025 COMPLETE CBC W/AUTO DIFF WBC: CPT | Performed by: NURSE PRACTITIONER

## 2024-01-22 PROCEDURE — 84443 ASSAY THYROID STIM HORMONE: CPT | Performed by: NURSE PRACTITIONER

## 2024-01-22 PROCEDURE — 99214 OFFICE O/P EST MOD 30 MIN: CPT | Performed by: NURSE PRACTITIONER

## 2024-01-22 PROCEDURE — 82785 ASSAY OF IGE: CPT

## 2024-01-22 PROCEDURE — 36415 COLL VENOUS BLD VENIPUNCTURE: CPT

## 2024-01-22 RX ORDER — ALBUTEROL SULFATE 90 UG/1
2 AEROSOL, METERED RESPIRATORY (INHALATION) EVERY 4 HOURS PRN
Qty: 18 G | Refills: 0 | Status: SHIPPED | OUTPATIENT
Start: 2024-01-22

## 2024-01-22 RX ORDER — FLUTICASONE PROPIONATE 50 MCG
SPRAY, SUSPENSION (ML) NASAL
Qty: 16 G | Refills: 0 | Status: SHIPPED | OUTPATIENT
Start: 2024-01-22 | End: 2024-01-22

## 2024-01-22 RX ORDER — LEVOCETIRIZINE DIHYDROCHLORIDE 5 MG/1
5 TABLET, FILM COATED ORAL EVERY EVENING
Qty: 90 TABLET | Refills: 1 | Status: SHIPPED | OUTPATIENT
Start: 2024-01-22

## 2024-01-22 RX ORDER — METHYLPREDNISOLONE ACETATE 80 MG/ML
80 INJECTION, SUSPENSION INTRA-ARTICULAR; INTRALESIONAL; INTRAMUSCULAR; SOFT TISSUE ONCE
Status: COMPLETED | OUTPATIENT
Start: 2024-01-22 | End: 2024-01-22

## 2024-01-22 RX ORDER — FLUTICASONE PROPIONATE 50 MCG
1 SPRAY, SUSPENSION (ML) NASAL DAILY
Qty: 48 G | Refills: 2 | Status: SHIPPED | OUTPATIENT
Start: 2024-01-22

## 2024-01-22 RX ADMIN — METHYLPREDNISOLONE ACETATE 80 MG: 80 INJECTION, SUSPENSION INTRA-ARTICULAR; INTRALESIONAL; INTRAMUSCULAR; SOFT TISSUE at 11:50

## 2024-01-22 NOTE — PROGRESS NOTES
"Chief Complaint   Patient presents with    Shortness of Breath    Follow-up         Subjective   Carola Resendiz is a 68 y.o. female.   Patient comes today for follow up of shortness of breath and COPD.     She states she has been sick \"all winter\". [facility] to dizziness and was told fluid in ears. She was treated with zpack, prednisone, nasal sprays and albuterol.  The week before she was treated with doxycycline. However, she states she is not taking the prednisone as prescribed because she is trying to make it last longer.     She has a productive cough of clear sputum. No fever or chills.     She c/o of head congestion.     Patient is using medications, as prescribed. She uses high dose Trelegy daily. She uses uses DREW once a day. She is only uses DREW neb once a day even though she has been sick.     She uses oxygen at night.     Exercise tolerance has also remained stable.     Quit smoking 11 years ago.       The following portions of the patient's history were reviewed and updated as appropriate: allergies, current medications, past family history, past medical history, past social history, and past surgical history.    Review of Systems   HENT:  Positive for sinus pressure and sneezing. Negative for sore throat.    Respiratory:  Positive for cough and wheezing. Negative for chest tightness and shortness of breath.        Objective   Visit Vitals  /78   Pulse 79   Resp 14   Ht 157.5 cm (62\") Comment: pt reported   Wt 69.9 kg (154 lb)   SpO2 96%   BMI 28.17 kg/m²         Physical Exam  Vitals reviewed.   HENT:      Head: Atraumatic.      Mouth/Throat:      Mouth: Mucous membranes are moist.   Eyes:      Extraocular Movements: Extraocular movements intact.   Cardiovascular:      Rate and Rhythm: Normal rate and regular rhythm.   Pulmonary:      Effort: Pulmonary effort is normal. No respiratory distress.      Comments: Somewhat decreased A/E without wheezing.   Musculoskeletal:      " Comments: Gait normal.   Skin:     General: Skin is warm.   Neurological:      Mental Status: She is alert and oriented to person, place, and time.         Assessment & Plan   Diagnoses and all orders for this visit:    1. Personal history of nicotine dependence (Primary)  -      CT Chest Low Dose Cancer Screening WO; Future    2. Other allergic rhinitis    3. Nocturnal hypoxia    4. COPD with acute exacerbation  -     methylPREDNISolone acetate (DEPO-medrol) injection 80 mg  -     albuterol sulfate  (90 Base) MCG/ACT inhaler; Inhale 2 puffs Every 4 (Four) Hours As Needed for Wheezing.  Dispense: 18 g; Refill: 0    5. Allergic rhinitis, unspecified seasonality, unspecified trigger  -     IgE; Future  -     Allergens, Zone 8; Future    Other orders  -     Discontinue: fluticasone (FLONASE) 50 MCG/ACT nasal spray; 1-2 sprays each nostril daily  Dispense: 16 g; Refill: 0  -     Discontinue: Fluticasone-Umeclidin-Vilant (Trelegy Ellipta) 200-62.5-25 MCG/ACT aerosol powder ; Inhale 1 puff Daily.  Dispense: 180 each; Refill: 3  -     ipratropium (ATROVENT) 0.02 % nebulizer solution; Take 2.5 mL by nebulization Every 4 (Four) Hours As Needed for Wheezing or Shortness of Air.  Dispense: 300 mL; Refill: 2  -     fluticasone (FLONASE) 50 MCG/ACT nasal spray; 1 spray into the nostril(s) as directed by provider Daily. Administer 1 spray in each nostril for each dose.  Dispense: 48 g; Refill: 2  -     Fluticasone-Umeclidin-Vilant 200-62.5-25 MCG/ACT aerosol powder ; Inhale 1 puff Daily.  Dispense: 180 each; Refill: 2  -     levocetirizine (XYZAL) 5 MG tablet; Take 1 tablet by mouth Every Evening.  Dispense: 90 tablet; Refill: 1           Return in about 4 months (around 5/22/2024) for Recheck, For Dr. Lin, labs.    DISCUSSION (if any):  PFT 5/2023 consistent with severe obstruction.     She continues to cough a lot. I have prescribed steroid injection for AECOPD. I have told her to take the prednisone as prescribed and  finish all the antibiotic.     I have also told her to use Atrovent 3 times a for the next 5 days.     She is not taking prednisone as directed and not using duonebs regularly which is not fully treating symptoms. Since duonebs makes her nervous, I will change neb to atrovent. She has used this before.     CXR reviewed from 1/18/24. NADP.   Study Result    Narrative & Impression   FINAL REPORT     CLINICAL HISTORY:  cough worse for one week; COPD excaerbation     FINDINGS:  Two views of the chest were obtained. The heart size and  pulmonary vascularity are within normal limits.  The mediastinum  is normal. No acute pulmonary abnormality is identified. There  is no pneumothorax.  The bony thorax is intact.     IMPRESSION:  No active cardiopulmonary disease.     Authenticated and Electronically Signed by Tyshawn Mccormick III, MD on 01/18/2024 03:56:57 PM     She may have some overlap of asthma with COPD so I have ordered IgE and RAST.    She would benefit from abs eosinophil repeat when not on steroids.     Continue high-dose Trelegy daily.     Continue flonase and add xyzal for uncontrolled allergic rhinitis.     Compliance with medications stressed.     Side effects of prescribed medications discussed with the patient.       Latest Reference Range & Units Most Recent   Eosinophils Absolute 0.00 - 0.40 10*3/mm3 0.33  7/14/23 08:39       The patient belongs to the risk group for which lung cancer screening has been recommended. She did not get CT due to insurance. I have reordered CT today.     A repeat CT was ordered for September 2023 however the insurance she had at that time was out of network with [facility] and she did not have the CT completed.    study Result    Narrative & Impression   CT CHEST WITHOUT CONTRAST     HISTORY: Follow-up     COMPARISON: 03/29/2022, 11/05/2021     FINDINGS: Axial CT images of the chest were obtained without contrast.  Coronal reformatted images were also obtained. This study was  performed  with techniques to keep radiation doses as low as reasonably achievable,  (ALARA). Individualized dose reduction techniques using automated  exposure control or adjustment of mA and/or kV according to the patient  size were employed.     There is no evidence of mediastinal or hilar mass or adenopathy. No  axillary mass or adenopathy is identified.. Mild emphysema Unchanged 1.6  cm right apical groundglass nodularity. No focal consolidation. No chest  wall abnormality is identified. Limited images of the upper abdomen  reveal no mass or localized inflammatory process.     IMPRESSION:  Stable 1.6 cm right apical groundglass nodularity. Consider  follow-up in 12 months.           This report was signed and finalized on 9/29/2022 3:50 PM by Concetta Fernandes MD.       Dictated utilizing Dragon dictation.    This document was electronically signed by MITCHEL Heller January 22, 2024  10:38 EST

## 2024-01-23 LAB — TSH SERPL DL<=0.05 MIU/L-ACNC: 10.1 UIU/ML (ref 0.27–4.2)

## 2024-01-24 LAB
A ALTERNATA IGE QN: <0.1 KU/L
A FUMIGATUS IGE QN: <0.1 KU/L
AMER ROACH IGE QN: <0.1 KU/L
BAHIA GRASS IGE QN: <0.1 KU/L
BERMUDA GRASS IGE QN: <0.1 KU/L
BOXELDER IGE QN: <0.1 KU/L
C HERBARUM IGE QN: <0.1 KU/L
CAT DANDER IGE QN: <0.1 KU/L
CMN PIGWEED IGE QN: <0.1 KU/L
COMMON RAGWEED IGE QN: <0.1 KU/L
CONV CLASS DESCRIPTION: NORMAL
D FARINAE IGE QN: <0.1 KU/L
D PTERONYSS IGE QN: <0.1 KU/L
DOG DANDER IGE QN: <0.1 KU/L
ENGL PLANTAIN IGE QN: <0.1 KU/L
HAZELNUT POLN IGE QN: <0.1 KU/L
JOHNSON GRASS IGE QN: <0.1 KU/L
KENT BLUE GRASS IGE QN: <0.1 KU/L
LONDON PLANE IGE QN: <0.1 KU/L
M RACEMOSUS IGE QN: <0.1 KU/L
MT JUNIPER IGE QN: <0.1 KU/L
MUGWORT IGE QN: <0.1 KU/L
NETTLE IGE QN: <0.1 KU/L
P NOTATUM IGE QN: <0.1 KU/L
S BOTRYOSUM IGE QN: <0.1 KU/L
SHEEP SORREL IGE QN: <0.1 KU/L
SWEET GUM IGE QN: <0.1 KU/L
WHITE ELM IGE QN: <0.1 KU/L
WHITE HICKORY IGE QN: <0.1 KU/L
WHITE MULBERRY IGE QN: <0.1 KU/L
WHITE OAK IGE QN: <0.1 KU/L

## 2024-01-25 LAB — IGE SERPL-ACNC: 55 IU/ML (ref 6–495)

## 2024-01-29 ENCOUNTER — TELEPHONE (OUTPATIENT)
Dept: SURGERY | Facility: CLINIC | Age: 69
End: 2024-01-29
Payer: MEDICARE

## 2024-01-29 NOTE — TELEPHONE ENCOUNTER
PT CALLED TO LET US KNOW HER OXYGEN LEVEL DROPS WHILE SHE IS ASLEEP. PT IS CONCERNED THAT MIGHT BE A ISSUE WHEN SHE IS ON ANESTHESIA? PT ASKED FOR A CALL BACK FROM [place].

## 2024-01-29 NOTE — TELEPHONE ENCOUNTER
I talked with pt, I told her that I will let Dr. Cuevas and [facility] know.

## 2024-01-30 DIAGNOSIS — R10.13 EPIGASTRIC PAIN: Primary | ICD-10-CM

## 2024-01-30 NOTE — PRE-PROCEDURE INSTRUCTIONS
PAT phone history completed with patient for upcoming procedure on 1/31/24 with Dr. Cuevas.    PAT PASS reviewed with patient and they verbalize understanding of the following:     Do not eat or drink anything after midnight the night before procedure unless otherwise instructed by physician/surgeon's office, this includes no gum, candy, mints, tobacco products or e-cigarettes.  Do not shave the area to be operated on at least 48 hours prior to procedure.  Do not wear makeup, lotion, hair products, or nail polish.  Do not wear any jewelry and remove all piercings.  Do not wear any adhesive if you wear dentures.  Do not wear contacts; bring in glasses if needed.  Only take medications on the morning of procedure as instructed by PAT nurse per anesthesia guidelines or as instructed by physician's office.  If you are on any blood thinners reach out to the physician/surgeon's office for instructions on when/if they will need to be stopped prior to procedure.  Bring in picture ID and insurance card, advanced directive copies if applicable, CPAP/BIPAP/Inhalers if indicated morning of procedure, leave any other valuables at home.  Ensure you have arranged for someone to drive you home the day of your procedure and someone to care for you at home afterwards. It is recommended that you do not drive, drink alcohol, or make any major legal decisions for at least 24 hours after your procedure is complete.    Instructions given on hospital entrance and registration location.

## 2024-01-31 ENCOUNTER — HOSPITAL ENCOUNTER (OUTPATIENT)
Facility: HOSPITAL | Age: 69
Setting detail: HOSPITAL OUTPATIENT SURGERY
Discharge: HOME | End: 2024-01-31
Attending: SURGERY | Admitting: SURGERY
Payer: MEDICARE

## 2024-01-31 ENCOUNTER — ANESTHESIA (OUTPATIENT)
Dept: GASTROENTEROLOGY | Facility: HOSPITAL | Age: 69
End: 2024-01-31
Payer: MEDICARE

## 2024-01-31 ENCOUNTER — ANESTHESIA EVENT (OUTPATIENT)
Dept: GASTROENTEROLOGY | Facility: HOSPITAL | Age: 69
End: 2024-01-31
Payer: MEDICARE

## 2024-01-31 VITALS
DIASTOLIC BLOOD PRESSURE: 68 MMHG | RESPIRATION RATE: 18 BRPM | OXYGEN SATURATION: 97 % | HEART RATE: 57 BPM | TEMPERATURE: 97 F | SYSTOLIC BLOOD PRESSURE: 137 MMHG

## 2024-01-31 DIAGNOSIS — R10.13 EPIGASTRIC PAIN: ICD-10-CM

## 2024-01-31 PROCEDURE — S0260 H&P FOR SURGERY: HCPCS | Performed by: SURGERY

## 2024-01-31 PROCEDURE — 43239 EGD BIOPSY SINGLE/MULTIPLE: CPT | Performed by: SURGERY

## 2024-01-31 PROCEDURE — 25810000003 LACTATED RINGERS PER 1000 ML: Performed by: SURGERY

## 2024-01-31 PROCEDURE — 25010000002 PROPOFOL 10 MG/ML EMULSION: Performed by: NURSE ANESTHETIST, CERTIFIED REGISTERED

## 2024-01-31 PROCEDURE — 88305 TISSUE EXAM BY PATHOLOGIST: CPT

## 2024-01-31 RX ORDER — SODIUM CHLORIDE, SODIUM LACTATE, POTASSIUM CHLORIDE, CALCIUM CHLORIDE 600; 310; 30; 20 MG/100ML; MG/100ML; MG/100ML; MG/100ML
1000 INJECTION, SOLUTION INTRAVENOUS CONTINUOUS
Status: DISCONTINUED | OUTPATIENT
Start: 2024-01-31 | End: 2024-01-31 | Stop reason: HOSPADM

## 2024-01-31 RX ORDER — PROPOFOL 10 MG/ML
VIAL (ML) INTRAVENOUS CONTINUOUS PRN
Status: DISCONTINUED | OUTPATIENT
Start: 2024-01-31 | End: 2024-01-31 | Stop reason: SURG

## 2024-01-31 RX ADMIN — SODIUM CHLORIDE, POTASSIUM CHLORIDE, SODIUM LACTATE AND CALCIUM CHLORIDE 1000 ML: 600; 310; 30; 20 INJECTION, SOLUTION INTRAVENOUS at 07:14

## 2024-01-31 RX ADMIN — LIDOCAINE HYDROCHLORIDE 40 MG: 20 INJECTION, SOLUTION INTRAVENOUS at 08:00

## 2024-01-31 RX ADMIN — PROPOFOL 100 MCG/KG/MIN: 10 INJECTION, EMULSION INTRAVENOUS at 08:00

## 2024-01-31 NOTE — ANESTHESIA POSTPROCEDURE EVALUATION
Patient: Carola Resendiz    Procedure Summary       Date: 01/31/24 Room / Location: [facility] ENDOSCOPY 3 / [facility] ENDOSCOPY    Anesthesia Start: 0755 Anesthesia Stop: 0808    Procedure: ESOPHAGOGASTRODUODENOSCOPY with biopsy (Esophagus) Diagnosis:       Epigastric pain      (Epigastric pain [R10.13])    Surgeons: Catalino Cuevas MD Provider: Trent Philippe CRNA    Anesthesia Type: MAC ASA Status: 2            Anesthesia Type: MAC    Vitals  No vitals data found for the desired time range.          Post Anesthesia Care and Evaluation    Patient location during evaluation: PHASE II  Patient participation: complete - patient participated  Level of consciousness: awake and alert  Pain score: 0  Pain management: satisfactory to patient    Airway patency: patent  Anesthetic complications: No anesthetic complications  PONV Status: none  Cardiovascular status: acceptable and stable  Respiratory status: acceptable and spontaneous ventilation  Hydration status: acceptable    Comments: Vitals signs as noted in nursing documentation as per protocol.

## 2024-01-31 NOTE — H&P
Carola Resendiz    1955    Primary Care Provider: Kari Sterling APRN    Chief Complaint : GERD       SUBJECTIVE:    History of present illness: I saw the patient in the hospital today as a consultation for evaluation and treatment of a history significant for gastroesophageal reflux disease and epigastric discomfort.  She has had this for years, she does take proton pump inhibitors which seemed to help, she has had previous upper endoscopy with documentation of reflux.    She is also 3 years past her scheduled time for repeat colonoscopy because of a history significant for colon polyps.    She does have a history significant for diverticulosis and diverticulitis, she does have intermittent right lower quadrant abdominal discomfort dull in nature.    Review of Systems:  Constitutional:  Negative for chills, fever, and unexpected weight change.  HENT: Negative for trouble swallowing and voice change.  Eyes:  Negative for visual disturbance.  Respiratory:  Negative for apnea, cough, chest tightness, shortness of breath, and wheezing.  Cardiovascular:  Negative for chest pain, palpitations, and leg swelling.  Gastrointestinal:  Negative for abdominal distention, abdominal pain, anal bleeding, blood in stool, constipation, diarrhea, nausea, rectal pain, and vomiting.  Musculoskeletal:  Negative for back pain, gait problem, and joint swelling.  Skin:  Negative for color change, rash, and wound  Neurological:  Negative for dizziness, syncope, speech difficulty, weakness, numbness, and headaches.  Hematological:  Negative for adenopathy.  Does not bruise/bleed easily.  Psychiatric/Behavioral:  Negative for confusion.  The patient is not nervous/anxious.        History:    Past Medical History:   Diagnosis Date    Abdominal pain, RUQ     Acute bronchitis     Atypical mole     COPD (chronic obstructive pulmonary disease)     Cough     Diarrhea     Disease of thyroid gland     Edema     Elevated blood pressure  reading without diagnosis of hypertension     Esophageal reflux     Fever     Full dentures     Hematochezia     Labyrinthitis, acute     Low back pain     Nocturnal hypoxia     Peptic ulcer     Shortness of breath     Sinusitis     Urinary frequency     UTI (urinary tract infection)     Wears glasses        Past Surgical History:   Procedure Laterality Date    APPENDECTOMY      BREAST CYST ASPIRATION Right     Years ago    BREAST CYST EXCISION Right     BREAST SURGERY Right     Fibroid removed from breast     ENDOSCOPY N/A 06/11/2020    Procedure: ESOPHAGOGASTRODUODENOSCOPY WITH BIOPSY;  Surgeon: Catalino Cuevas MD;  Location: [facility] ENDOSCOPY;  Service: Gastroenterology;  Laterality: N/A;    HYSTERECTOMY      Partial - unsure of when       Family History   Problem Relation Age of Onset    Emphysema Mother     Heart attack Father     Prostate cancer Father     Breast cancer Neg Hx     Ovarian cancer Neg Hx        Social History     Socioeconomic History    Marital status:    Tobacco Use    Smoking status: Former     Packs/day: 1.50     Years: 25.00     Additional pack years: 0.00     Total pack years: 37.50     Types: Cigarettes     Quit date: 12/3/2013     Years since quitting: 10.1    Smokeless tobacco: Never   Vaping Use    Vaping Use: Never used   Substance and Sexual Activity    Alcohol use: No    Drug use: Never    Sexual activity: Defer       Allergies:  No Known Allergies    Medications:    Current Facility-Administered Medications:     lactated ringers infusion 1,000 mL, 1,000 mL, Intravenous, Continuous, Catalino Cuevas MD, Last Rate: 25 mL/hr at 01/31/24 0714, 1,000 mL at 01/31/24 0714    OBJECTIVE:    Vital Signs:   Vitals:    01/31/24 0655   BP: (!) 182/78   BP Location: Right arm   Patient Position: Sitting   Pulse: 69   Resp: 16   Temp: 97.1 °F (36.2 °C)   TempSrc: Oral   SpO2: 98%       Physical Exam:   General Appearance:    Alert, cooperative, in no acute distress   Head:     Normocephalic, without obvious abnormality, atraumatic   Eyes:            Lids and lashes normal, conjunctivae and sclerae normal, no   icterus, no pallor, corneas clear, PERRLA   Throat:   No oral lesions, no thrush, oral mucosa moist   Neck:   No adenopathy, supple, trachea midline, no thyromegaly, no   carotid bruit, no JVD   Lungs:     Clear to auscultation,respirations regular, even and                  unlabored    Heart:    Regular rhythm and normal rate, normal S1 and S2, no            murmur, no gallop, no rub, no click   Chest Wall:    No abnormalities observed   Abdomen:     Normal bowel sounds, no masses, no organomegaly, soft        non-tender, non-distended, no guarding, no rebound                tenderness, no peritoneal signs   Extremities:   Moves all extremities well, no edema, no cyanosis, no             redness   Pulses:   Pulses palpable and equal bilaterally   Skin:   No bleeding, bruising or rash   Lymph nodes:   No palpable adenopathy   Neurologic:   Cranial nerves 2 - 12 grossly intact, sensation intact, DTR       present and equal bilaterally   Results Review:    Lab Results (last 24 hours)       ** No results found for the last 24 hours. **              I reviewed the patient's new clinical results.  I reviewed the patient's new imaging results and agree with the interpretation.  I reviewed the patient's other test results and agree with the interpretation    ASSESSMENT PLAN:    Patient Active Problem List   Diagnosis    Chronic obstructive pulmonary disease    Gastroesophageal reflux disease without esophagitis    Hypothyroidism    Peptic ulcer    Screening mammogram, encounter for    Environmental allergies    Bilateral hip pain    Encounter for Medicare annual wellness exam    Mixed hyperlipidemia    Elevated blood sugar    Overweight (BMI 25.0-29.9)    Screening for osteoporosis    Menopause    Allergic contact dermatitis due to plants, except food    Epigastric pain       In short, I  did have a detailed and extensive discussion with the patient in the hospital today.  She needs to undergo upper endoscopy, full risk and benefits of operative versus nonoperative intervention have been discussed with the patient, she understands and agrees, and wishes to proceed.    I would like to see her back in the office in about 1-2 weeks after the upper endoscopy.  I want to talk to her about the need for colonoscopy and we will further evaluate her abdominal discomfort.    I discussed the patients findings and my recommendations with patient    Catalino Cuevas MD  01/31/24  07:57 EST

## 2024-01-31 NOTE — ANESTHESIA PREPROCEDURE EVALUATION
Anesthesia Evaluation     Patient summary reviewed and Nursing notes reviewed   no history of anesthetic complications:   NPO Solid Status: > 8 hours  NPO Liquid Status: > 8 hours           Airway   Mallampati: I  TM distance: >3 FB  Neck ROM: full  No difficulty expected and Possible difficult intubation  Dental - normal exam     Pulmonary - normal exam   (+) COPD moderate,shortness of breath  Cardiovascular - normal exam  Exercise tolerance: good (4-7 METS)    (+) hyperlipidemia      Neuro/Psych- negative ROS  GI/Hepatic/Renal/Endo    (+) GERD, PUD, thyroid problem hypothyroidism    Musculoskeletal     (+) back pain  Abdominal  - normal exam    Bowel sounds: normal.   Substance History - negative use     OB/GYN negative ob/gyn ROS   (-)  Pregnant    Comment: Post menopause      Other        ROS/Med Hx Other: NKDA    Problem list:   Chronic obstructive pulmonary disease (CMS/HCC)  Gastroesophageal reflux disease without esophagitis  Hypothyroidism  Peptic ulcer  Screening mammogram, encounter for  Environmental allergies  Generalized abdominal pain  Bilateral hip pain  Pain of upper abdomen  Labyrinthitis, acute  Acute bronchitis                Anesthesia Plan    ASA 2     MAC     (Pt told that intravenous sedation will be used as the primary anesthetic along with local anesthesia if necessary. Every effort will be made to make sure the patient is comfortable.     The patient was told they may or may not have recall for the procedure. It was further explained that if the MAC was not adequate that a general anesthetic with either an LMA or endotracheal tube would be required.     Will proceed with the plan of care.)  intravenous induction     Anesthetic plan, risks, benefits, and alternatives have been provided, discussed and informed consent has been obtained with: patient.    Plan discussed with CRNA.

## 2024-02-01 LAB — REF LAB TEST METHOD: NORMAL

## 2024-02-02 NOTE — PROGRESS NOTES
"Patient: Carola Resendiz    YOB: 1955    Date: 02/07/2024    Primary Care Provider: Kari Sterling APRN    Chief Complaint   Patient presents with    Follow-up     EGD       SUBJECTIVE:    History of present illness:  I saw the patient in the office today as a follow-up consultation for \"lower abdominal pain and bloating.\"  Patient had recent EGD which was performed 01/31/2024.  Biopsies taken at the time of endoscopy showed reactive antral type mucosa with intestinal metaplasia and squamous mucosa with reactive changes.  Patient is also here for consultation for a colonoscopy for further evaluation of left lower quadrant pain and \"excessive bloating\" after meals.    She is many years delayed from follow-up colonoscopy for evaluation of colon polyps.  She continues to complain of upper quadrant abdominal discomfort.    The following portions of the patient's history were reviewed and updated as appropriate: allergies, current medications, past family history, past medical history, past social history, past surgical history and problem list.    Review of Systems   Constitutional:  Negative for chills, fever and unexpected weight change.   HENT:  Negative for hearing loss, trouble swallowing and voice change.    Eyes:  Negative for visual disturbance.   Respiratory:  Negative for apnea, cough, chest tightness, shortness of breath and wheezing.    Cardiovascular:  Negative for chest pain, palpitations and leg swelling.   Gastrointestinal:  Negative for abdominal distention, abdominal pain, anal bleeding, blood in stool, constipation, diarrhea, nausea, rectal pain and vomiting.   Endocrine: Negative for cold intolerance and heat intolerance.   Genitourinary:  Negative for difficulty urinating, dysuria and flank pain.   Musculoskeletal:  Negative for back pain and gait problem.   Skin:  Negative for color change, rash and wound.   Neurological:  Negative for dizziness, syncope, speech difficulty, " weakness, light-headedness, numbness and headaches.   Hematological:  Negative for adenopathy. Does not bruise/bleed easily.   Psychiatric/Behavioral:  Negative for confusion. The patient is not nervous/anxious.        History:  Past Medical History:   Diagnosis Date    Abdominal pain, RUQ     Acute bronchitis     Atypical mole     COPD (chronic obstructive pulmonary disease)     Cough     Diarrhea     Disease of thyroid gland     Edema     Elevated blood pressure reading without diagnosis of hypertension     Esophageal reflux     Fever     Full dentures     Hematochezia     Labyrinthitis, acute     Low back pain     Nocturnal hypoxia     Peptic ulcer     Shortness of breath     Sinusitis     Urinary frequency     UTI (urinary tract infection)     Wears glasses        Past Surgical History:   Procedure Laterality Date    APPENDECTOMY      BREAST CYST ASPIRATION Right     Years ago    BREAST CYST EXCISION Right     BREAST SURGERY Right     Fibroid removed from breast     ENDOSCOPY N/A 06/11/2020    Procedure: ESOPHAGOGASTRODUODENOSCOPY WITH BIOPSY;  Surgeon: Catalino Cuevas MD;  Location: [facility] ENDOSCOPY;  Service: Gastroenterology;  Laterality: N/A;    ENDOSCOPY N/A 1/31/2024    Procedure: ESOPHAGOGASTRODUODENOSCOPY with biopsy;  Surgeon: Catalino Cuevas MD;  Location: [facility] ENDOSCOPY;  Service: Gastroenterology;  Laterality: N/A;    HYSTERECTOMY      Partial - unsure of when       Family History   Problem Relation Age of Onset    Emphysema Mother     Heart attack Father     Prostate cancer Father     Breast cancer Neg Hx     Ovarian cancer Neg Hx        Social History     Tobacco Use    Smoking status: Former     Packs/day: 1.50     Years: 25.00     Additional pack years: 0.00     Total pack years: 37.50     Types: Cigarettes     Quit date: 12/3/2013     Years since quitting: 10.1    Smokeless tobacco: Never   Vaping Use    Vaping Use: Never used   Substance Use Topics    Alcohol use: No    Drug use: Never  "      Allergies:  No Known Allergies    Medications:    Current Outpatient Medications:     albuterol sulfate  (90 Base) MCG/ACT inhaler, Inhale 2 puffs Every 4 (Four) Hours As Needed for Wheezing., Disp: 18 g, Rfl: 0    fluticasone (FLONASE) 50 MCG/ACT nasal spray, 1 spray into the nostril(s) as directed by provider Daily. Administer 1 spray in each nostril for each dose. (Patient taking differently: 1 spray into the nostril(s) as directed by provider As Needed. Administer 1 spray in each nostril for each dose.), Disp: 48 g, Rfl: 2    Fluticasone-Umeclidin-Vilant 200-62.5-25 MCG/ACT aerosol powder , Inhale 1 puff Daily., Disp: 180 each, Rfl: 2    ipratropium (ATROVENT) 0.02 % nebulizer solution, Take 2.5 mL by nebulization Every 4 (Four) Hours As Needed for Wheezing or Shortness of Air., Disp: 300 mL, Rfl: 2    levothyroxine (SYNTHROID, LEVOTHROID) 50 MCG tablet, Take 1 tablet by mouth Daily., Disp: 90 tablet, Rfl: 1    omeprazole (priLOSEC) 40 MG capsule, TAKE 1 CAPSULE BY MOUTH DAILY, Disp: 90 capsule, Rfl: 1    ondansetron ODT (ZOFRAN-ODT) 4 MG disintegrating tablet, Place 1 tablet under the tongue Every 8 (Eight) Hours As Needed for Nausea or Vomiting., Disp: 15 tablet, Rfl: 0    levocetirizine (XYZAL) 5 MG tablet, Take 1 tablet by mouth Every Evening. (Patient not taking: Reported on 1/30/2024), Disp: 90 tablet, Rfl: 1    OBJECTIVE:    Vital Signs:   Vitals:    02/07/24 1414   BP: 138/68   Pulse: 71   Temp: 97.8 °F (36.6 °C)   SpO2: 96%   Weight: 69.9 kg (154 lb)   Height: 157.5 cm (62.01\")       Physical Exam:   General Appearance:    Alert, cooperative, in no acute distress   Head:    Normocephalic, without obvious abnormality, atraumatic   Eyes:            Lids and lashes normal, conjunctivae and sclerae normal, no   icterus, no pallor, corneas clear, PERRL   Ears:    Ears appear intact with no abnormalities noted   Throat:   No oral lesions, no thrush, oral mucosa moist   Neck:   No adenopathy, " supple, trachea midline, no thyromegaly,  no JVD   Lungs:     Clear to auscultation,respirations regular, even and                  unlabored    Heart:    Regular rhythm and normal rate, normal S1 and S2, no            murmur   Abdomen:     no masses, no organomegaly, soft non-tender, non-distended, no guarding, there is no evidence of tenderness, no peritoneal signs   Extremities:   Moves all extremities well, no edema, no cyanosis, no             redness   Pulses:   Pulses palpable and equal bilaterally   Skin:   No bleeding, bruising or rash   Lymph nodes:   No palpable adenopathy   Neurologic:   Cranial nerves 2 - 12 grossly intact, sensation intact      Results Review:   I reviewed the patient's new clinical results.  I reviewed the patient's new imaging results and agree with the interpretation.  I reviewed the patient's other test results and agree with the interpretation    Review of Systems was reviewed and confirmed as accurate as documented by the MA.    ASSESSMENT/PLAN:    1. Epigastric pain    2. History of colon polyps        I recommend a colonoscopy for further evaluation. The procedure was explained as well as the risks which include but are not limited to bleeding, infection, perforation, abdominal pain etc. The patient understands these risks and the procedure and wishes to proceed.     Electronically signed by Catalino Cuevas MD  02/07/24 14:12 EST

## 2024-02-02 NOTE — H&P (VIEW-ONLY)
"Patient: Carola Resendiz    YOB: 1955    Date: 02/07/2024    Primary Care Provider: Kari Sterling APRN    Chief Complaint   Patient presents with    Follow-up     EGD       SUBJECTIVE:    History of present illness:  I saw the patient in the office today as a follow-up consultation for \"lower abdominal pain and bloating.\"  Patient had recent EGD which was performed 01/31/2024.  Biopsies taken at the time of endoscopy showed reactive antral type mucosa with intestinal metaplasia and squamous mucosa with reactive changes.  Patient is also here for consultation for a colonoscopy for further evaluation of left lower quadrant pain and \"excessive bloating\" after meals.    She is many years delayed from follow-up colonoscopy for evaluation of colon polyps.  She continues to complain of upper quadrant abdominal discomfort.    The following portions of the patient's history were reviewed and updated as appropriate: allergies, current medications, past family history, past medical history, past social history, past surgical history and problem list.    Review of Systems   Constitutional:  Negative for chills, fever and unexpected weight change.   HENT:  Negative for hearing loss, trouble swallowing and voice change.    Eyes:  Negative for visual disturbance.   Respiratory:  Negative for apnea, cough, chest tightness, shortness of breath and wheezing.    Cardiovascular:  Negative for chest pain, palpitations and leg swelling.   Gastrointestinal:  Negative for abdominal distention, abdominal pain, anal bleeding, blood in stool, constipation, diarrhea, nausea, rectal pain and vomiting.   Endocrine: Negative for cold intolerance and heat intolerance.   Genitourinary:  Negative for difficulty urinating, dysuria and flank pain.   Musculoskeletal:  Negative for back pain and gait problem.   Skin:  Negative for color change, rash and wound.   Neurological:  Negative for dizziness, syncope, speech difficulty, " weakness, light-headedness, numbness and headaches.   Hematological:  Negative for adenopathy. Does not bruise/bleed easily.   Psychiatric/Behavioral:  Negative for confusion. The patient is not nervous/anxious.        History:  Past Medical History:   Diagnosis Date    Abdominal pain, RUQ     Acute bronchitis     Atypical mole     COPD (chronic obstructive pulmonary disease)     Cough     Diarrhea     Disease of thyroid gland     Edema     Elevated blood pressure reading without diagnosis of hypertension     Esophageal reflux     Fever     Full dentures     Hematochezia     Labyrinthitis, acute     Low back pain     Nocturnal hypoxia     Peptic ulcer     Shortness of breath     Sinusitis     Urinary frequency     UTI (urinary tract infection)     Wears glasses        Past Surgical History:   Procedure Laterality Date    APPENDECTOMY      BREAST CYST ASPIRATION Right     Years ago    BREAST CYST EXCISION Right     BREAST SURGERY Right     Fibroid removed from breast     ENDOSCOPY N/A 06/11/2020    Procedure: ESOPHAGOGASTRODUODENOSCOPY WITH BIOPSY;  Surgeon: Catalino Cuevas MD;  Location: [facility] ENDOSCOPY;  Service: Gastroenterology;  Laterality: N/A;    ENDOSCOPY N/A 1/31/2024    Procedure: ESOPHAGOGASTRODUODENOSCOPY with biopsy;  Surgeon: Catalino Cuevas MD;  Location: [facility] ENDOSCOPY;  Service: Gastroenterology;  Laterality: N/A;    HYSTERECTOMY      Partial - unsure of when       Family History   Problem Relation Age of Onset    Emphysema Mother     Heart attack Father     Prostate cancer Father     Breast cancer Neg Hx     Ovarian cancer Neg Hx        Social History     Tobacco Use    Smoking status: Former     Packs/day: 1.50     Years: 25.00     Additional pack years: 0.00     Total pack years: 37.50     Types: Cigarettes     Quit date: 12/3/2013     Years since quitting: 10.1    Smokeless tobacco: Never   Vaping Use    Vaping Use: Never used   Substance Use Topics    Alcohol use: No    Drug use: Never  "      Allergies:  No Known Allergies    Medications:    Current Outpatient Medications:     albuterol sulfate  (90 Base) MCG/ACT inhaler, Inhale 2 puffs Every 4 (Four) Hours As Needed for Wheezing., Disp: 18 g, Rfl: 0    fluticasone (FLONASE) 50 MCG/ACT nasal spray, 1 spray into the nostril(s) as directed by provider Daily. Administer 1 spray in each nostril for each dose. (Patient taking differently: 1 spray into the nostril(s) as directed by provider As Needed. Administer 1 spray in each nostril for each dose.), Disp: 48 g, Rfl: 2    Fluticasone-Umeclidin-Vilant 200-62.5-25 MCG/ACT aerosol powder , Inhale 1 puff Daily., Disp: 180 each, Rfl: 2    ipratropium (ATROVENT) 0.02 % nebulizer solution, Take 2.5 mL by nebulization Every 4 (Four) Hours As Needed for Wheezing or Shortness of Air., Disp: 300 mL, Rfl: 2    levothyroxine (SYNTHROID, LEVOTHROID) 50 MCG tablet, Take 1 tablet by mouth Daily., Disp: 90 tablet, Rfl: 1    omeprazole (priLOSEC) 40 MG capsule, TAKE 1 CAPSULE BY MOUTH DAILY, Disp: 90 capsule, Rfl: 1    ondansetron ODT (ZOFRAN-ODT) 4 MG disintegrating tablet, Place 1 tablet under the tongue Every 8 (Eight) Hours As Needed for Nausea or Vomiting., Disp: 15 tablet, Rfl: 0    levocetirizine (XYZAL) 5 MG tablet, Take 1 tablet by mouth Every Evening. (Patient not taking: Reported on 1/30/2024), Disp: 90 tablet, Rfl: 1    OBJECTIVE:    Vital Signs:   Vitals:    02/07/24 1414   BP: 138/68   Pulse: 71   Temp: 97.8 °F (36.6 °C)   SpO2: 96%   Weight: 69.9 kg (154 lb)   Height: 157.5 cm (62.01\")       Physical Exam:   General Appearance:    Alert, cooperative, in no acute distress   Head:    Normocephalic, without obvious abnormality, atraumatic   Eyes:            Lids and lashes normal, conjunctivae and sclerae normal, no   icterus, no pallor, corneas clear, PERRL   Ears:    Ears appear intact with no abnormalities noted   Throat:   No oral lesions, no thrush, oral mucosa moist   Neck:   No adenopathy, " supple, trachea midline, no thyromegaly,  no JVD   Lungs:     Clear to auscultation,respirations regular, even and                  unlabored    Heart:    Regular rhythm and normal rate, normal S1 and S2, no            murmur   Abdomen:     no masses, no organomegaly, soft non-tender, non-distended, no guarding, there is no evidence of tenderness, no peritoneal signs   Extremities:   Moves all extremities well, no edema, no cyanosis, no             redness   Pulses:   Pulses palpable and equal bilaterally   Skin:   No bleeding, bruising or rash   Lymph nodes:   No palpable adenopathy   Neurologic:   Cranial nerves 2 - 12 grossly intact, sensation intact      Results Review:   I reviewed the patient's new clinical results.  I reviewed the patient's new imaging results and agree with the interpretation.  I reviewed the patient's other test results and agree with the interpretation    Review of Systems was reviewed and confirmed as accurate as documented by the MA.    ASSESSMENT/PLAN:    1. Epigastric pain    2. History of colon polyps        I recommend a colonoscopy for further evaluation. The procedure was explained as well as the risks which include but are not limited to bleeding, infection, perforation, abdominal pain etc. The patient understands these risks and the procedure and wishes to proceed.     Electronically signed by Catalino Cuevas MD  02/07/24 14:12 EST

## 2024-02-07 ENCOUNTER — OFFICE VISIT (OUTPATIENT)
Dept: SURGERY | Facility: CLINIC | Age: 69
End: 2024-02-07
Payer: MEDICARE

## 2024-02-07 VITALS
SYSTOLIC BLOOD PRESSURE: 138 MMHG | DIASTOLIC BLOOD PRESSURE: 68 MMHG | OXYGEN SATURATION: 96 % | HEIGHT: 62 IN | WEIGHT: 154 LBS | BODY MASS INDEX: 28.34 KG/M2 | TEMPERATURE: 97.8 F | HEART RATE: 71 BPM

## 2024-02-07 DIAGNOSIS — Z86.010 HISTORY OF COLON POLYPS: ICD-10-CM

## 2024-02-07 DIAGNOSIS — R10.13 EPIGASTRIC PAIN: Primary | ICD-10-CM

## 2024-02-07 PROCEDURE — 1160F RVW MEDS BY RX/DR IN RCRD: CPT | Performed by: SURGERY

## 2024-02-07 PROCEDURE — 1159F MED LIST DOCD IN RCRD: CPT | Performed by: SURGERY

## 2024-02-07 PROCEDURE — 99214 OFFICE O/P EST MOD 30 MIN: CPT | Performed by: SURGERY

## 2024-02-07 RX ORDER — POLYETHYLENE GLYCOL 3350 17 G/17G
238 POWDER, FOR SOLUTION ORAL ONCE
Qty: 238 G | Refills: 0 | Status: SHIPPED | OUTPATIENT
Start: 2024-02-07 | End: 2024-02-07

## 2024-02-07 RX ORDER — BISACODYL 5 MG/1
TABLET, DELAYED RELEASE ORAL
Qty: 4 TABLET | Refills: 0 | Status: SHIPPED | OUTPATIENT
Start: 2024-02-07

## 2024-02-08 ENCOUNTER — OFFICE VISIT (OUTPATIENT)
Dept: INTERNAL MEDICINE | Facility: CLINIC | Age: 69
End: 2024-02-08
Payer: MEDICARE

## 2024-02-08 VITALS
DIASTOLIC BLOOD PRESSURE: 72 MMHG | OXYGEN SATURATION: 97 % | WEIGHT: 151 LBS | BODY MASS INDEX: 27.79 KG/M2 | SYSTOLIC BLOOD PRESSURE: 140 MMHG | HEART RATE: 68 BPM | HEIGHT: 62 IN | RESPIRATION RATE: 16 BRPM

## 2024-02-08 DIAGNOSIS — R26.89 LOSS OF BALANCE: ICD-10-CM

## 2024-02-08 DIAGNOSIS — H81.12 BENIGN PAROXYSMAL POSITIONAL VERTIGO OF LEFT EAR: Primary | ICD-10-CM

## 2024-02-08 PROBLEM — R26.9 ABNORMAL GAIT: Status: RESOLVED | Noted: 2024-02-08 | Resolved: 2024-02-08

## 2024-02-08 PROBLEM — H81.10 BPPV (BENIGN PAROXYSMAL POSITIONAL VERTIGO): Status: ACTIVE | Noted: 2024-02-08

## 2024-02-08 PROBLEM — R26.9 ABNORMAL GAIT: Status: ACTIVE | Noted: 2024-02-08

## 2024-02-08 PROCEDURE — 99213 OFFICE O/P EST LOW 20 MIN: CPT | Performed by: STUDENT IN AN ORGANIZED HEALTH CARE EDUCATION/TRAINING PROGRAM

## 2024-02-08 RX ORDER — MECLIZINE HCL 12.5 MG/1
12.5 TABLET ORAL 3 TIMES DAILY PRN
Qty: 30 TABLET | Refills: 0 | Status: SHIPPED | OUTPATIENT
Start: 2024-02-08

## 2024-02-08 NOTE — ASSESSMENT & PLAN NOTE
No alarming signs for stroke, no weakness, numbness, ataxia.  BPPV is highly likely however due to loss of balance, will need to rule out structural brain abnormality.  Obtain CT head.

## 2024-02-08 NOTE — PROGRESS NOTES
Office Note     Name: Carola Resendiz    : 1955     MRN: [id number]     Chief Complaint  Balance Problems (Having trouble balancing)    Subjective     History of Present Illness:  Carola Resendiz is a 68 y.o. female who presents today for imbalance. For the past couple of weeks, she has noted that her head feels like it is leaning to one side. She notes that when she is walking, she feels like she is leaning to one side or forward. No dizziness. No medications tried. No vision changes. She notes that when the bends her head forward diagonally to the left, she feels like falling and like her head just wants to fall that way.       Family History:   Family History   Problem Relation Age of Onset    Emphysema Mother     Heart attack Father     Prostate cancer Father     Breast cancer Neg Hx     Ovarian cancer Neg Hx        Social History:   Social History     Socioeconomic History    Marital status:    Tobacco Use    Smoking status: Former     Packs/day: 1.50     Years: 25.00     Additional pack years: 0.00     Total pack years: 37.50     Types: Cigarettes     Quit date: 12/3/2013     Years since quitting: 10.1    Smokeless tobacco: Never   Vaping Use    Vaping Use: Never used   Substance and Sexual Activity    Alcohol use: No    Drug use: Never    Sexual activity: Defer       Health Maintenance   Topic Date Due    RSV Vaccine - Adults (1 - 1-dose 60+ series) Never done    ZOSTER VACCINE (2 of 2) 2017    LUNG CANCER SCREENING  2023    COLORECTAL CANCER SCREENING  2024    COVID-19 Vaccine (3 - -24 season) 2024 (Originally 2023)    Pneumococcal Vaccine 65+ (2 of 2 - PCV) 2024 (Originally 3/18/2023)    DXA SCAN  2024    ANNUAL WELLNESS VISIT  2024    LIPID PANEL  2024    BMI FOLLOWUP  2025    MAMMOGRAM  2025    TDAP/TD VACCINES (2 - Td or Tdap) 2027    HEPATITIS C SCREENING  Completed    INFLUENZA VACCINE  Completed    PAP  "SMEAR  Discontinued       Objective     Vital Signs  /72   Pulse 68   Resp 16   Ht 157.5 cm (62.01\")   Wt 68.5 kg (151 lb)   SpO2 97%   BMI 27.61 kg/m²   Estimated body mass index is 27.61 kg/m² as calculated from the following:    Height as of this encounter: 157.5 cm (62.01\").    Weight as of this encounter: 68.5 kg (151 lb).        Physical Exam  Vitals and nursing note reviewed.   Constitutional:       Appearance: Normal appearance.   HENT:      Head: Normocephalic and atraumatic.      Comments: Upon bending head forward and diagonally to the left, she notes dizziness and feeling like her head is about to fall towards that direction  Cardiovascular:      Rate and Rhythm: Normal rate and regular rhythm.      Pulses: Normal pulses.      Heart sounds: Normal heart sounds.   Pulmonary:      Effort: Pulmonary effort is normal. No respiratory distress.      Breath sounds: Normal breath sounds. No wheezing, rhonchi or rales.   Abdominal:      General: Abdomen is flat. Bowel sounds are normal.      Palpations: Abdomen is soft.      Tenderness: There is no abdominal tenderness. There is no guarding.   Musculoskeletal:      Cervical back: Neck supple.   Skin:     General: Skin is warm.      Capillary Refill: Capillary refill takes less than 2 seconds.   Neurological:      General: No focal deficit present.      Mental Status: She is alert and oriented to person, place, and time. Mental status is at baseline.      Sensory: No sensory deficit.      Motor: No weakness.      Gait: Gait normal.   Psychiatric:         Mood and Affect: Mood normal.         Behavior: Behavior normal.          Procedures     Assessment and Plan     Diagnoses and all orders for this visit:    1. Benign paroxysmal positional vertigo of left ear (Primary)  Assessment & Plan:  New problem, uncontrolled  Loss of balance is likely due to BPPV  will manage empirically with meclizine for now  Refer to physical therapy for Epley " maneuver    Orders:  -     meclizine (ANTIVERT) 12.5 MG tablet; Take 1 tablet by mouth 3 (Three) Times a Day As Needed for Dizziness (balance issues).  Dispense: 30 tablet; Refill: 0  -     Ambulatory Referral to Physical Therapy Vestibular    2. Loss of balance  Assessment & Plan:  No alarming signs for stroke, no weakness, numbness, ataxia.  BPPV is highly likely however due to loss of balance, will need to rule out structural brain abnormality.  Obtain CT head.    Orders:  -     CT Head Without Contrast; Future         Counseling was given to patient for the following topics: instructions for management and risks and benefits of treatment options.    Follow Up  Return in about 2 months (around 4/8/2024) for with PCP.    MD MARCO [facility]  [address] [place] [id number] [phone]

## 2024-02-08 NOTE — ASSESSMENT & PLAN NOTE
New problem, uncontrolled  Loss of balance is likely due to BPPV  will manage empirically with meclizine for now  Refer to physical therapy for Epley maneuver

## 2024-02-09 DIAGNOSIS — E03.8 OTHER SPECIFIED HYPOTHYROIDISM: Primary | ICD-10-CM

## 2024-02-09 DIAGNOSIS — R79.89 ABNORMAL CBC: ICD-10-CM

## 2024-02-09 LAB — TSH SERPL DL<=0.005 MIU/L-ACNC: 14.2 UIU/ML (ref 0.27–4.2)

## 2024-02-09 RX ORDER — LEVOTHYROXINE SODIUM 88 UG/1
88 TABLET ORAL DAILY
Qty: 90 TABLET | Refills: 3 | Status: SHIPPED | OUTPATIENT
Start: 2024-02-09

## 2024-02-09 NOTE — PROGRESS NOTES
Spoke with patient and Rx sent in for 88 mcg recommend recheck in 8 weeks and advised patient to take on empty stomach with no other medications she stated understanding.

## 2024-02-12 ENCOUNTER — TELEPHONE (OUTPATIENT)
Dept: PULMONOLOGY | Facility: CLINIC | Age: 69
End: 2024-02-12

## 2024-02-12 NOTE — TELEPHONE ENCOUNTER
Caller: Carola Resendiz    Relationship: Self    Best call back number:[phone]    Caller requesting test results: PATIENT    What test was performed: LABS    When was the test performed: 01/22/2024    Where was the test performed: [facility]    Additional notes: PT NEEDS HER LAB RESULTS. PLEASE CALL THE PT BACK.

## 2024-02-13 ENCOUNTER — HOSPITAL ENCOUNTER (OUTPATIENT)
Dept: CT IMAGING | Facility: HOSPITAL | Age: 69
Discharge: HOME | End: 2024-02-13
Admitting: NURSE PRACTITIONER
Payer: MEDICARE

## 2024-02-13 ENCOUNTER — PATIENT OUTREACH (OUTPATIENT)
Dept: ONCOLOGY | Facility: HOSPITAL | Age: 69
End: 2024-02-13
Payer: MEDICARE

## 2024-02-13 ENCOUNTER — TREATMENT (OUTPATIENT)
Dept: PHYSICAL THERAPY | Facility: CLINIC | Age: 69
End: 2024-02-13
Payer: MEDICARE

## 2024-02-13 DIAGNOSIS — R42 DIZZINESSES: Primary | ICD-10-CM

## 2024-02-13 DIAGNOSIS — Z87.891 PERSONAL HISTORY OF NICOTINE DEPENDENCE: ICD-10-CM

## 2024-02-13 PROCEDURE — 71271 CT THORAX LUNG CANCER SCR C-: CPT

## 2024-02-16 ENCOUNTER — HOSPITAL ENCOUNTER (OUTPATIENT)
Dept: CT IMAGING | Facility: HOSPITAL | Age: 69
Discharge: HOME | End: 2024-02-16
Payer: MEDICARE

## 2024-02-16 DIAGNOSIS — R26.89 LOSS OF BALANCE: ICD-10-CM

## 2024-02-16 PROCEDURE — 70450 CT HEAD/BRAIN W/O DYE: CPT

## 2024-02-20 ENCOUNTER — TELEPHONE (OUTPATIENT)
Dept: SURGERY | Facility: CLINIC | Age: 69
End: 2024-02-20
Payer: MEDICARE

## 2024-02-20 ENCOUNTER — TREATMENT (OUTPATIENT)
Dept: PHYSICAL THERAPY | Facility: CLINIC | Age: 69
End: 2024-02-20
Payer: MEDICARE

## 2024-02-20 DIAGNOSIS — R42 DIZZINESSES: Primary | ICD-10-CM

## 2024-02-20 NOTE — PROGRESS NOTES
Physical Therapy Daily Treatment Note  [address] [place] [address]      Patient: Carola Resendiz   : 1955  Referring practitioner: Joy Martinez MD  Date of Initial Visit: Type: THERAPY  Noted: 2024  Today's Date: 2024  Patient seen for 2 sessions         Visit Diagnoses:    ICD-10-CM ICD-9-CM   1. Dizzinesses  R42 780.4       Subjective   Patient reports dizziness has decreased by 50% but still getting it with certain movements.    Objective   See Exercise, Manual, and Modality Logs for complete treatment.   Observation: pt with improved gait from evaluation, not reaching for furniture or PT's arm    Assessment/Plan  Pt with strong reaction during  R sided epley again. Pt with slight response during L sided epley. Discussed use at home if she feels safe as well as calling in sooner if symptoms persist. Pt scheduled for a follow up but she will call if she has symptom resolution and does not need it. Continue with current POT progressing pt per tolerance    Timed:         Manual Therapy:         mins  19744;     Therapeutic Exercise:         mins  81042;     Neuromuscular Sofie:      mins  57515;    Therapeutic Activity:   20       mins  31533;     Gait Training:           mins  54541;     Ultrasound:          mins  93003;    Ionto                                   mins   23308  Self Care                            mins   06220  Canalith Repos         mins 17583      Un-Timed:  Electrical Stimulation:        mins  48786 ( );  Dry Needling          mins self-pay  Traction          mins 81105      Timed Treatment:   20   mins   Total Treatment:     20   mins    Bhavna Joseph, PT  [place] License: [id number]

## 2024-02-20 NOTE — TELEPHONE ENCOUNTER
Called and confirmed  colonoscopy, 02/23/24, Dr Cuevas @ [facility]

## 2024-02-20 NOTE — TELEPHONE ENCOUNTER
Pt informed that colonoscopy scheduled @ [facility] 02/23/2024 louis be rescheduled @  [facility].  Pt will await call with new dates available.

## 2024-02-28 ENCOUNTER — TELEPHONE (OUTPATIENT)
Dept: SURGERY | Facility: CLINIC | Age: 69
End: 2024-02-28
Payer: MEDICARE

## 2024-02-28 PROBLEM — Z86.0100 HISTORY OF COLON POLYPS: Status: ACTIVE | Noted: 2024-02-07

## 2024-02-28 PROBLEM — Z86.010 HISTORY OF COLON POLYPS: Status: ACTIVE | Noted: 2024-02-07

## 2024-02-29 ENCOUNTER — TELEPHONE (OUTPATIENT)
Dept: ORTHOPEDICS | Facility: OTHER | Age: 69
End: 2024-02-29
Payer: MEDICARE

## 2024-02-29 NOTE — TELEPHONE ENCOUNTER
PATIENT IS BETTER AND DON'T NEED TO HAVE PHY THERE.  CANCELLED TODAY   Detail Level: Detailed Detail Level: Zone

## 2024-03-01 NOTE — PRE-PROCEDURE INSTRUCTIONS
PAT phone history completed with patient for upcoming procedure on 3/4/24 with Dr. Cuevas.    PAT PASS reviewed with patient and they verbalize understanding of the following:     Do not eat or drink anything after midnight the night before procedure unless otherwise instructed by physician/surgeon's office, this includes no gum, candy, mints, tobacco products or e-cigarettes.  Do not shave the area to be operated on at least 48 hours prior to procedure.  Do not wear makeup, lotion, hair products, or nail polish.  Do not wear any jewelry and remove all piercings.  Do not wear any adhesive if you wear dentures.  Do not wear contacts; bring in glasses if needed.  Only take medications on the morning of procedure as instructed by PAT nurse per anesthesia guidelines or as instructed by physician's office.  If you are on any blood thinners reach out to the physician/surgeon's office for instructions on when/if they will need to be stopped prior to procedure.  Bring in picture ID and insurance card, advanced directive copies if applicable, CPAP/BIPAP/Inhalers if indicated morning of procedure, leave any other valuables at home.  Ensure you have arranged for someone to drive you home the day of your procedure and someone to care for you at home afterwards. It is recommended that you do not drive, drink alcohol, or make any major legal decisions for at least 24 hours after your procedure is complete.    Instructions given on hospital entrance and registration location.

## 2024-03-04 ENCOUNTER — ANESTHESIA EVENT (OUTPATIENT)
Dept: GASTROENTEROLOGY | Facility: HOSPITAL | Age: 69
End: 2024-03-04
Payer: MEDICARE

## 2024-03-04 ENCOUNTER — ANESTHESIA (OUTPATIENT)
Dept: GASTROENTEROLOGY | Facility: HOSPITAL | Age: 69
End: 2024-03-04
Payer: MEDICARE

## 2024-03-04 ENCOUNTER — HOSPITAL ENCOUNTER (OUTPATIENT)
Facility: HOSPITAL | Age: 69
Setting detail: HOSPITAL OUTPATIENT SURGERY
Discharge: HOME | End: 2024-03-04
Attending: SURGERY | Admitting: SURGERY
Payer: MEDICARE

## 2024-03-04 VITALS
HEART RATE: 79 BPM | HEIGHT: 62 IN | TEMPERATURE: 97.5 F | WEIGHT: 151 LBS | RESPIRATION RATE: 18 BRPM | OXYGEN SATURATION: 98 % | SYSTOLIC BLOOD PRESSURE: 134 MMHG | BODY MASS INDEX: 27.79 KG/M2 | DIASTOLIC BLOOD PRESSURE: 65 MMHG

## 2024-03-04 DIAGNOSIS — Z86.010 HISTORY OF COLON POLYPS: ICD-10-CM

## 2024-03-04 PROCEDURE — 45380 COLONOSCOPY AND BIOPSY: CPT | Performed by: SURGERY

## 2024-03-04 PROCEDURE — 45384 COLONOSCOPY W/LESION REMOVAL: CPT | Performed by: SURGERY

## 2024-03-04 PROCEDURE — 25010000002 PROPOFOL 200 MG/20ML EMULSION: Performed by: NURSE ANESTHETIST, CERTIFIED REGISTERED

## 2024-03-04 PROCEDURE — 25010000002 ONDANSETRON PER 1 MG: Performed by: NURSE ANESTHETIST, CERTIFIED REGISTERED

## 2024-03-04 PROCEDURE — 25810000003 LACTATED RINGERS PER 1000 ML: Performed by: SURGERY

## 2024-03-04 RX ORDER — LIDOCAINE HCL/PF 100 MG/5ML
SYRINGE (ML) INJECTION AS NEEDED
Status: DISCONTINUED | OUTPATIENT
Start: 2024-03-04 | End: 2024-03-04 | Stop reason: SURG

## 2024-03-04 RX ORDER — PROPOFOL 10 MG/ML
INJECTION, EMULSION INTRAVENOUS AS NEEDED
Status: DISCONTINUED | OUTPATIENT
Start: 2024-03-04 | End: 2024-03-04 | Stop reason: SURG

## 2024-03-04 RX ORDER — SODIUM CHLORIDE, SODIUM LACTATE, POTASSIUM CHLORIDE, CALCIUM CHLORIDE 600; 310; 30; 20 MG/100ML; MG/100ML; MG/100ML; MG/100ML
1000 INJECTION, SOLUTION INTRAVENOUS CONTINUOUS
Status: DISCONTINUED | OUTPATIENT
Start: 2024-03-04 | End: 2024-03-04 | Stop reason: HOSPADM

## 2024-03-04 RX ORDER — ONDANSETRON 2 MG/ML
INJECTION INTRAMUSCULAR; INTRAVENOUS AS NEEDED
Status: DISCONTINUED | OUTPATIENT
Start: 2024-03-04 | End: 2024-03-04 | Stop reason: SURG

## 2024-03-04 RX ADMIN — PROPOFOL 50 MG: 10 INJECTION, EMULSION INTRAVENOUS at 14:36

## 2024-03-04 RX ADMIN — ONDANSETRON 4 MG: 2 INJECTION INTRAMUSCULAR; INTRAVENOUS at 14:44

## 2024-03-04 RX ADMIN — SODIUM CHLORIDE, POTASSIUM CHLORIDE, SODIUM LACTATE AND CALCIUM CHLORIDE 1000 ML: 600; 310; 30; 20 INJECTION, SOLUTION INTRAVENOUS at 14:05

## 2024-03-04 RX ADMIN — PROPOFOL 100 MG: 10 INJECTION, EMULSION INTRAVENOUS at 14:29

## 2024-03-04 RX ADMIN — Medication 50 MG: at 14:29

## 2024-03-04 NOTE — ANESTHESIA PREPROCEDURE EVALUATION
Anesthesia Evaluation     Patient summary reviewed and Nursing notes reviewed   no history of anesthetic complications:   NPO Solid Status: > 8 hours  NPO Liquid Status: > 8 hours           Airway   Mallampati: I  TM distance: >3 FB  Neck ROM: full  No difficulty expected and Possible difficult intubation  Dental    (+) upper dentures and lower dentures    Pulmonary - normal exam   (+) a smoker Former, COPD moderate,shortness of breath  Cardiovascular - normal exam  Exercise tolerance: good (4-7 METS)    (+) hyperlipidemia      Neuro/Psych- negative ROS  GI/Hepatic/Renal/Endo    (+) GERD, PUD, thyroid problem hypothyroidism    Musculoskeletal (-) negative ROS    Abdominal  - normal exam    Bowel sounds: normal.   Substance History - negative use     OB/GYN negative ob/gyn ROS   (-)  Pregnant    Comment: Post menopause      Other        ROS/Med Hx Other: NKDA    Problem list:   Chronic obstructive pulmonary disease (CMS/HCC)  Gastroesophageal reflux disease without esophagitis  Hypothyroidism  Peptic ulcer  Screening mammogram, encounter for  Environmental allergies  Generalized abdominal pain  Bilateral hip pain  Pain of upper abdomen  Labyrinthitis, acute  Acute bronchitis                Anesthesia Plan    ASA 2     MAC     (Pt told that intravenous sedation will be used as the primary anesthetic along with local anesthesia if necessary. Every effort will be made to make sure the patient is comfortable.     The patient was told they may or may not have recall for the procedure. It was further explained that if the MAC was not adequate that a general anesthetic with either an LMA or endotracheal tube would be required.     Will proceed with the plan of care.)  intravenous induction     Anesthetic plan, risks, benefits, and alternatives have been provided, discussed and informed consent has been obtained with: patient.  Pre-procedure education provided  Plan discussed with CRNA.

## 2024-03-04 NOTE — ANESTHESIA POSTPROCEDURE EVALUATION
Patient: Carola Resendiz    Procedure Summary       Date: 03/04/24 Room / Location: [facility] ENDOSCOPY 3 / [facility] ENDOSCOPY    Anesthesia Start: 1417 Anesthesia Stop: 1447    Procedure: COLONOSCOPY WITH POLYPECTOMY X2 Diagnosis:       History of colon polyps      (History of colon polyps [Z86.010])    Surgeons: Catalino Cuevas MD Provider: Brianna Bertrand CRNA    Anesthesia Type: MAC ASA Status: 2            Anesthesia Type: MAC    Vitals  Vitals Value Taken Time   /65 03/04/24 1520   Temp 97.5 °F (36.4 °C) 03/04/24 1520   Pulse 79 03/04/24 1520   Resp 18 03/04/24 1520   SpO2 98 % 03/04/24 1520           Post Anesthesia Care and Evaluation    Patient location during evaluation: PHASE II  Patient participation: complete - patient participated  Level of consciousness: awake and alert  Pain score: 0  Pain management: satisfactory to patient    Airway patency: patent  Anesthetic complications: No anesthetic complications  PONV Status: none  Cardiovascular status: acceptable and stable  Respiratory status: acceptable  Hydration status: acceptable    Comments: Vitals signs as noted in nursing documentation as per protocol.

## 2024-03-06 LAB — REF LAB TEST METHOD: NORMAL

## 2024-03-15 ENCOUNTER — PREP FOR SURGERY (OUTPATIENT)
Dept: OTHER | Facility: HOSPITAL | Age: 69
End: 2024-03-15
Payer: MEDICARE

## 2024-03-15 DIAGNOSIS — Z12.11 SCREENING FOR COLON CANCER: Primary | ICD-10-CM

## 2024-03-19 ENCOUNTER — TELEPHONE (OUTPATIENT)
Dept: SURGERY | Facility: CLINIC | Age: 69
End: 2024-03-19
Payer: MEDICARE

## 2024-03-19 NOTE — TELEPHONE ENCOUNTER
Patient called for colonoscopy pathology results from 3/4/24. I informed her of hyperplastic polyps and negative for dysplasia or carcinoma. She will be placed on a 3 year colon recall. She understood.

## 2024-04-08 RX ORDER — LEVOTHYROXINE SODIUM 0.05 MG/1
50 TABLET ORAL DAILY
Qty: 90 TABLET | Refills: 0 | OUTPATIENT
Start: 2024-04-08

## 2024-04-16 LAB
ERYTHROCYTE [DISTWIDTH] IN BLOOD BY AUTOMATED COUNT: 11.7 % (ref 12.3–15.4)
HCT VFR BLD AUTO: 35.3 % (ref 34–46.6)
HGB BLD-MCNC: 11.6 G/DL (ref 12–15.9)
MCH RBC QN AUTO: 29.7 PG (ref 26.6–33)
MCHC RBC AUTO-ENTMCNC: 32.9 G/DL (ref 31.5–35.7)
MCV RBC AUTO: 90.3 FL (ref 79–97)
PLATELET # BLD AUTO: 333 10*3/MM3 (ref 140–450)
RBC # BLD AUTO: 3.91 10*6/MM3 (ref 3.77–5.28)
TSH SERPL DL<=0.005 MIU/L-ACNC: 0.2 UIU/ML (ref 0.27–4.2)
WBC # BLD AUTO: 7.97 10*3/MM3 (ref 3.4–10.8)

## 2024-04-22 DIAGNOSIS — E03.8 OTHER SPECIFIED HYPOTHYROIDISM: Primary | ICD-10-CM

## 2024-04-22 RX ORDER — LEVOTHYROXINE SODIUM 0.07 MG/1
75 TABLET ORAL DAILY
Qty: 90 TABLET | Refills: 3 | Status: SHIPPED | OUTPATIENT
Start: 2024-04-22

## 2024-05-14 DIAGNOSIS — K21.9 GASTROESOPHAGEAL REFLUX DISEASE, UNSPECIFIED WHETHER ESOPHAGITIS PRESENT: ICD-10-CM

## 2024-05-14 RX ORDER — OMEPRAZOLE 40 MG/1
40 CAPSULE, DELAYED RELEASE ORAL DAILY
Qty: 90 CAPSULE | Refills: 1 | Status: SHIPPED | OUTPATIENT
Start: 2024-05-14

## 2024-05-22 ENCOUNTER — OFFICE VISIT (OUTPATIENT)
Dept: PULMONOLOGY | Facility: CLINIC | Age: 69
End: 2024-05-22
Payer: MEDICARE

## 2024-05-22 VITALS
RESPIRATION RATE: 18 BRPM | HEIGHT: 62 IN | WEIGHT: 157 LBS | HEART RATE: 71 BPM | SYSTOLIC BLOOD PRESSURE: 122 MMHG | DIASTOLIC BLOOD PRESSURE: 60 MMHG | OXYGEN SATURATION: 95 % | BODY MASS INDEX: 28.89 KG/M2

## 2024-05-22 DIAGNOSIS — G47.34 NOCTURNAL HYPOXIA: ICD-10-CM

## 2024-05-22 DIAGNOSIS — J30.89 OTHER ALLERGIC RHINITIS: ICD-10-CM

## 2024-05-22 DIAGNOSIS — J44.9 CHRONIC OBSTRUCTIVE PULMONARY DISEASE, UNSPECIFIED COPD TYPE: Primary | ICD-10-CM

## 2024-05-22 DIAGNOSIS — Z87.891 PERSONAL HISTORY OF NICOTINE DEPENDENCE: ICD-10-CM

## 2024-05-22 PROCEDURE — 99214 OFFICE O/P EST MOD 30 MIN: CPT | Performed by: INTERNAL MEDICINE

## 2024-05-22 RX ORDER — AZELASTINE 1 MG/ML
1 SPRAY, METERED NASAL 2 TIMES DAILY PRN
Qty: 1 EACH | Refills: 5 | Status: SHIPPED | OUTPATIENT
Start: 2024-05-22

## 2024-05-22 NOTE — PROGRESS NOTES
"  Chief Complaint   Patient presents with    Shortness of Breath    Follow-up         Subjective   Carola Resendiz is a 68 y.o. female.   Patient was evaluated today for follow up of shortness of breath, hypoxia and COPD.     Patient says that her symptoms have been stable since the last clinic visit. she reports no recent exacerbations.      Patient is using Trelegy, as prescribed. Exercise tolerance has also remained stable.     Quit smoking 11 years ago.     Patient says that she has been using her nasal sprays on a seasonal basis and describes no significant ongoing issues other than occasional congestion.     The patient says that she is using oxygen as prescribed and feels that it appears to be helping some of her symptoms.    The following portions of the patient's history were reviewed and updated as appropriate: allergies, current medications, past family history, past medical history, past social history, and past surgical history.    Review of Systems   HENT:  Positive for sneezing and sore throat. Negative for sinus pressure.    Respiratory:  Positive for cough, chest tightness and wheezing. Negative for shortness of breath.    Psychiatric/Behavioral:  Negative for sleep disturbance.        Objective   Visit Vitals  /60   Pulse 71   Resp 18   Ht 157.5 cm (62.01\") Comment: pt reported   Wt 71.2 kg (157 lb)   SpO2 95%   BMI 28.71 kg/m²         BMI Readings from Last 3 Encounters:   05/22/24 28.71 kg/m²   03/01/24 27.62 kg/m²   02/08/24 27.61 kg/m²       Physical Exam  Vitals reviewed.   Constitutional:       Appearance: She is well-developed.   HENT:      Head: Normocephalic and atraumatic.   Eyes:      Extraocular Movements: Extraocular movements intact.   Cardiovascular:      Rate and Rhythm: Normal rate.   Pulmonary:      Comments: Somewhat hyperresonant to percussion.  Somewhat decreased air entry.  No obvious wheezing noted.   Musculoskeletal:      Cervical back: Neck supple.   Neurological:    "   Mental Status: She is alert.             Assessment & Plan   Diagnoses and all orders for this visit:    1. Chronic obstructive pulmonary disease, unspecified COPD type (Primary)    2. Other allergic rhinitis    3. Nocturnal hypoxia    4. Personal history of nicotine dependence  -      CT Chest Low Dose Cancer Screening WO; Future    Other orders  -     azelastine (ASTELIN) 0.1 % nasal spray; 1 spray into the nostril(s) as directed by provider 2 (Two) Times a Day As Needed for Rhinitis or Allergies. Use in each nostril as directed  Dispense: 1 each; Refill: 5  -     Fluticasone-Umeclidin-Vilant (TRELEGY ELLIPTA) 200-62.5-25 MCG/ACT inhaler; Inhale 1 puff Daily.  Dispense: 180 each; Refill: 2           Return in about 9 months (around 2/28/2025) for Recheck, Imaging, For [place] ([place]).    DISCUSSION (if any):  Last CT scan results was reviewed in great detail with the patient.  Results for orders placed during the hospital encounter of 02/13/24    CT Chest Low Dose Cancer Screening WO    Narrative  PROCEDURE: CT CHEST LOW DOSE CANCER SCREENING WO-    HISTORY: Lung cancer screening, >= 20 pk-yr smoking history (Age >=  50y); Z87.891-Personal history of nicotine dependence    COMPARISON: September 29, 2022 and July 2020    TECHNIQUE: Axial images were obtained from the thoracic inlet through  the upper abdomen per the low-dose protocol. Coronal images were  reformatted. DLP 35.53 mGy.cm; CTDI 0.9 mGy    FINDINGS: There is centrilobular emphysema. There is an irregular  density in the posterior right lung apex, stable since 2020. There is  linear scarring in the lingula and right middle lobe. There are a few  scattered 3 mm or less nodules. There is no airspace disease. The heart  is normal in size. The aorta is normal in caliber. There are no pleural  or pericardial effusions. The visualized upper abdomen is unremarkable.  Bone windows reveal no acute osseous abnormalities.    Impression  Few scattered small  nodules. No evidence of neoplasm. Lung  RADS category 2.    RECOMMENDATIONS:Annual low-dose chest CT.        Images were reviewed, interpreted, and dictated by Dr. Kentrell Em MD  Transcribed by Gwendolyn Stout PA-C.    This report was signed and finalized on 2/13/2024 2:46 PM by Kentrell Em MD.      Laboratory data was reviewed with her.   Lab Results   Component Value Date    AFPTM 147 02/08/2021     Lab Results   Component Value Date    PHENOTYPE MM 02/08/2021       Latest available PFTs were reviewed.  Consistent with severe COPD. Performed in May 2023.    We have reviewed her pulmonary medications in great detail.    Compliance with medications stressed.     Side effects of prescribed medications discussed with the patient    The patient belongs to the risk group for which lung cancer screening has been recommended.  This will be indicated in Feb 2025. This has been ordered     Patient was advised to continue her nasal spray, especially given improvement in symptoms overall.    The patient was advised to continue using oxygen at night.    Vaccination status addressed.    Up-to-date with influenza vaccinations.     Up-to-date with pneumonia vaccinations.     It was recommended that the patient consider Prevnar-20 vaccination and discuss it with her PCP, if not already obtained.     For the vaccines, that she refused today, I have asked her to discuss this further with her PCP.      Dictated utilizing Dragon dictation.    This document was electronically signed by Lenny Lin MD on 05/22/24 at 11:54 EDT

## 2024-06-26 LAB — TSH SERPL DL<=0.005 MIU/L-ACNC: 1.2 UIU/ML (ref 0.27–4.2)

## 2024-12-12 ENCOUNTER — HOSPITAL ENCOUNTER (OUTPATIENT)
Dept: GENERAL RADIOLOGY | Facility: HOSPITAL | Age: 69
Discharge: HOME | End: 2024-12-12
Admitting: FAMILY MEDICINE
Payer: MEDICARE

## 2024-12-12 DIAGNOSIS — R10.84 GENERALIZED ABDOMINAL PAIN: ICD-10-CM

## 2024-12-12 PROCEDURE — 74022 RADEX COMPL AQT ABD SERIES: CPT

## 2025-01-02 ENCOUNTER — LAB (OUTPATIENT)
Dept: LAB | Facility: HOSPITAL | Age: 70
End: 2025-01-02
Payer: MEDICARE

## 2025-01-02 ENCOUNTER — OFFICE VISIT (OUTPATIENT)
Dept: SURGERY | Facility: CLINIC | Age: 70
End: 2025-01-02
Payer: MEDICARE

## 2025-01-02 VITALS
HEIGHT: 62 IN | SYSTOLIC BLOOD PRESSURE: 152 MMHG | OXYGEN SATURATION: 97 % | TEMPERATURE: 98.2 F | HEART RATE: 76 BPM | BODY MASS INDEX: 28.85 KG/M2 | DIASTOLIC BLOOD PRESSURE: 84 MMHG | WEIGHT: 156.8 LBS

## 2025-01-02 DIAGNOSIS — R10.30 LOWER ABDOMINAL PAIN: Primary | ICD-10-CM

## 2025-01-02 DIAGNOSIS — R10.32 LEFT LOWER QUADRANT ABDOMINAL PAIN: ICD-10-CM

## 2025-01-02 DIAGNOSIS — R10.30 LOWER ABDOMINAL PAIN: ICD-10-CM

## 2025-01-02 LAB
ALBUMIN SERPL-MCNC: 4.2 G/DL (ref 3.5–5.2)
ALBUMIN/GLOB SERPL: 1.1 G/DL
ALP SERPL-CCNC: 101 U/L (ref 39–117)
ALT SERPL W P-5'-P-CCNC: 11 U/L (ref 1–33)
ANION GAP SERPL CALCULATED.3IONS-SCNC: 8.1 MMOL/L (ref 5–15)
AST SERPL-CCNC: 20 U/L (ref 1–32)
BACTERIA UR QL AUTO: NORMAL /HPF
BASOPHILS # BLD AUTO: 0.07 10*3/MM3 (ref 0–0.2)
BASOPHILS NFR BLD AUTO: 0.7 % (ref 0–1.5)
BILIRUB SERPL-MCNC: 0.4 MG/DL (ref 0–1.2)
BILIRUB UR QL STRIP: NEGATIVE
BUN SERPL-MCNC: 13 MG/DL (ref 8–23)
BUN/CREAT SERPL: 15.3 (ref 7–25)
CALCIUM SPEC-SCNC: 9.4 MG/DL (ref 8.6–10.5)
CHLORIDE SERPL-SCNC: 102 MMOL/L (ref 98–107)
CLARITY UR: CLEAR
CO2 SERPL-SCNC: 26.9 MMOL/L (ref 22–29)
COLOR UR: YELLOW
CREAT SERPL-MCNC: 0.85 MG/DL (ref 0.57–1)
DEPRECATED RDW RBC AUTO: 43.4 FL (ref 37–54)
EGFRCR SERPLBLD CKD-EPI 2021: 73.8 ML/MIN/1.73
EOSINOPHIL # BLD AUTO: 0.27 10*3/MM3 (ref 0–0.4)
EOSINOPHIL NFR BLD AUTO: 2.8 % (ref 0.3–6.2)
ERYTHROCYTE [DISTWIDTH] IN BLOOD BY AUTOMATED COUNT: 12.3 % (ref 12.3–15.4)
GLOBULIN UR ELPH-MCNC: 3.8 GM/DL
GLUCOSE SERPL-MCNC: 99 MG/DL (ref 65–99)
GLUCOSE UR STRIP-MCNC: NEGATIVE MG/DL
HCT VFR BLD AUTO: 37.7 % (ref 34–46.6)
HGB BLD-MCNC: 11.8 G/DL (ref 12–15.9)
HGB UR QL STRIP.AUTO: ABNORMAL
HOLD SPECIMEN: NORMAL
HYALINE CASTS UR QL AUTO: NORMAL /LPF
IMM GRANULOCYTES # BLD AUTO: 0.02 10*3/MM3 (ref 0–0.05)
IMM GRANULOCYTES NFR BLD AUTO: 0.2 % (ref 0–0.5)
KETONES UR QL STRIP: NEGATIVE
LEUKOCYTE ESTERASE UR QL STRIP.AUTO: NEGATIVE
LYMPHOCYTES # BLD AUTO: 1.81 10*3/MM3 (ref 0.7–3.1)
LYMPHOCYTES NFR BLD AUTO: 18.9 % (ref 19.6–45.3)
MCH RBC QN AUTO: 29.5 PG (ref 26.6–33)
MCHC RBC AUTO-ENTMCNC: 31.3 G/DL (ref 31.5–35.7)
MCV RBC AUTO: 94.3 FL (ref 79–97)
MONOCYTES # BLD AUTO: 0.59 10*3/MM3 (ref 0.1–0.9)
MONOCYTES NFR BLD AUTO: 6.2 % (ref 5–12)
NEUTROPHILS NFR BLD AUTO: 6.8 10*3/MM3 (ref 1.7–7)
NEUTROPHILS NFR BLD AUTO: 71.2 % (ref 42.7–76)
NITRITE UR QL STRIP: NEGATIVE
NRBC BLD AUTO-RTO: 0 /100 WBC (ref 0–0.2)
PH UR STRIP.AUTO: 6.5 [PH] (ref 5–8)
PLATELET # BLD AUTO: 328 10*3/MM3 (ref 140–450)
PMV BLD AUTO: 10.7 FL (ref 6–12)
POTASSIUM SERPL-SCNC: 4.1 MMOL/L (ref 3.5–5.2)
PROT SERPL-MCNC: 8 G/DL (ref 6–8.5)
PROT UR QL STRIP: NEGATIVE
RBC # BLD AUTO: 4 10*6/MM3 (ref 3.77–5.28)
RBC # UR STRIP: NORMAL /HPF
REF LAB TEST METHOD: NORMAL
SODIUM SERPL-SCNC: 137 MMOL/L (ref 136–145)
SP GR UR STRIP: <=1.005 (ref 1–1.03)
SQUAMOUS #/AREA URNS HPF: NORMAL /HPF
UROBILINOGEN UR QL STRIP: ABNORMAL
WBC # UR STRIP: NORMAL /HPF
WBC NRBC COR # BLD AUTO: 9.56 10*3/MM3 (ref 3.4–10.8)

## 2025-01-02 PROCEDURE — 80053 COMPREHEN METABOLIC PANEL: CPT

## 2025-01-02 PROCEDURE — 36415 COLL VENOUS BLD VENIPUNCTURE: CPT

## 2025-01-02 PROCEDURE — 81001 URINALYSIS AUTO W/SCOPE: CPT

## 2025-01-02 PROCEDURE — 85025 COMPLETE CBC W/AUTO DIFF WBC: CPT

## 2025-01-02 NOTE — PROGRESS NOTES
"Patient: Carola Resendiz    YOB: 1955    Date: 01/02/2025    Primary Care Provider: Kari Sterling APRN    Chief Complaint   Patient presents with    Abdominal Pain       SUBJECTIVE:    History of present illness:  I saw the patient in the office  today as a consultation for evaluation and treatment of \"stomach/right upper quadrant pain.\"  Patient complains of \"abdominal pain\" in her epigastric and left abdominal areas which has been ongoing for a couple of weeks.  Patient was seen initially [facility] 12/12/2024 and she was given Rx Carafate.  Patient had abdominal x-rays which showed nonspecific gas pattern with no evidence of obstruction or free air.      Patient's last upper endoscopy was performed 01/31/2024, biopsies taken at the time of endoscopy showed reactive antral type mucosa with intestinal metaplasia and squamous mucosa with reactive changes.  Patient's last colonoscopy was performed 03/04/2024 at which time she had 2 hyperplastic polyps removed.    Apparently this has been going on for the past week or so and is getting progressively worse, complains of sharp discomfort in the left lower quadrant.    The following portions of the patient's history were reviewed and updated as appropriate: allergies, current medications, past family history, past medical history, past social history, past surgical history and problem list.    Review of Systems   Constitutional:  Negative for chills, fever and unexpected weight change.   HENT:  Negative for hearing loss, trouble swallowing and voice change.    Eyes:  Negative for visual disturbance.   Respiratory:  Negative for apnea, cough, chest tightness, shortness of breath and wheezing.    Cardiovascular:  Negative for chest pain, palpitations and leg swelling.   Gastrointestinal:  Positive for abdominal pain. Negative for abdominal distention, anal bleeding, blood in stool, constipation, diarrhea, nausea, rectal pain and vomiting. "   Endocrine: Negative for cold intolerance and heat intolerance.   Genitourinary:  Negative for difficulty urinating, dysuria and flank pain.   Musculoskeletal:  Negative for back pain and gait problem.   Skin:  Negative for color change, rash and wound.   Neurological:  Negative for dizziness, syncope, speech difficulty, weakness, light-headedness, numbness and headaches.   Hematological:  Negative for adenopathy. Does not bruise/bleed easily.   Psychiatric/Behavioral:  Negative for confusion. The patient is not nervous/anxious.          Review of Systems:  Constitutional:  Negative for chills, fever, and unexpected weight change.  HENT: Negative for trouble swallowing and voice change.  Eyes:  Negative for visual disturbance.  Respiratory:  Negative for apnea, cough, chest tightness, shortness of breath, and wheezing.  Cardiovascular:  Negative for chest pain, palpitations, and leg swelling.  Gastrointestinal:  Negative for abdominal distention, abdominal pain, anal bleeding, blood in stool, constipation, diarrhea, nausea, rectal pain, and vomiting.  There are complaints of left lower quadrant abdominal discomfort  Musculoskeletal:  Negative for back pain, gait problem, and joint swelling.  Skin:  Negative for color change, rash, and wound  Neurological:  Negative for dizziness, syncope, speech difficulty, weakness, numbness, and headaches.  Hematological:  Negative for adenopathy.  Does not bruise/bleed easily.  Psychiatric/Behavioral:  Negative for confusion.  The patient is not nervous/anxious.          History:  Past Medical History:   Diagnosis Date    Abdominal pain, RUQ     Acute bronchitis     Atypical mole     COPD (chronic obstructive pulmonary disease)     Cough     Diarrhea     Disease of thyroid gland     Edema     Elevated blood pressure reading without diagnosis of hypertension     Esophageal reflux     Fever     Full dentures     Hematochezia     Labyrinthitis, acute     Low back pain     Nocturnal  hypoxia     Peptic ulcer     Shortness of breath     Sinusitis     Urinary frequency     UTI (urinary tract infection)     Wears glasses        Past Surgical History:   Procedure Laterality Date    APPENDECTOMY      BREAST SURGERY Right     Fibroid removed from breast     COLONOSCOPY N/A 3/4/2024    Procedure: COLONOSCOPY WITH POLYPECTOMY X2;  Surgeon: Catalino Cuevas MD;  Location: [facility] ENDOSCOPY;  Service: Gastroenterology;  Laterality: N/A;    ENDOSCOPY N/A 2020    Procedure: ESOPHAGOGASTRODUODENOSCOPY WITH BIOPSY;  Surgeon: Catalino Cuevas MD;  Location: [facility] ENDOSCOPY;  Service: Gastroenterology;  Laterality: N/A;    ENDOSCOPY N/A 2024    Procedure: ESOPHAGOGASTRODUODENOSCOPY with biopsy;  Surgeon: Catalino Cuevas MD;  Location: [facility] ENDOSCOPY;  Service: Gastroenterology;  Laterality: N/A;    HYSTERECTOMY      Partial - unsure of when       Family History   Problem Relation Age of Onset    Emphysema Mother     Heart attack Father     Prostate cancer Father     Breast cancer Neg Hx     Ovarian cancer Neg Hx        Social History     Tobacco Use    Smoking status: Former     Current packs/day: 0.00     Average packs/day: 1.5 packs/day for 25.0 years (37.5 ttl pk-yrs)     Types: Cigarettes     Start date: 12/3/1988     Quit date: 12/3/2013     Years since quittin.0     Passive exposure: Past    Smokeless tobacco: Never   Vaping Use    Vaping status: Never Used   Substance Use Topics    Alcohol use: No    Drug use: Never       Allergies:  No Known Allergies    Medications:    Current Outpatient Medications:     albuterol sulfate  (90 Base) MCG/ACT inhaler, Inhale 2 puffs Every 4 (Four) Hours As Needed for Wheezing., Disp: 18 g, Rfl: 0    azelastine (ASTELIN) 0.1 % nasal spray, 1 spray into the nostril(s) as directed by provider 2 (Two) Times a Day As Needed for Rhinitis or Allergies. Use in each nostril as directed, Disp: 1 each, Rfl: 5    fluticasone (FLONASE) 50 MCG/ACT nasal  "spray, 1 spray into the nostril(s) as directed by provider Daily. Administer 1 spray in each nostril for each dose. (Patient taking differently: Administer 1 spray into the nostril(s) as directed by provider As Needed. Administer 1 spray in each nostril for each dose.), Disp: 48 g, Rfl: 2    Fluticasone-Umeclidin-Vilant (TRELEGY ELLIPTA) 200-62.5-25 MCG/ACT inhaler, Inhale 1 puff Daily., Disp: 180 each, Rfl: 2    ipratropium (ATROVENT) 0.02 % nebulizer solution, Take 2.5 mL by nebulization Every 4 (Four) Hours As Needed for Wheezing or Shortness of Air., Disp: 300 mL, Rfl: 2    levothyroxine (Synthroid) 75 MCG tablet, Take 1 tablet by mouth Daily., Disp: 90 tablet, Rfl: 3    omeprazole (priLOSEC) 40 MG capsule, TAKE 1 CAPSULE BY MOUTH DAILY, Disp: 90 capsule, Rfl: 1    ondansetron ODT (ZOFRAN-ODT) 4 MG disintegrating tablet, Place 1 tablet under the tongue Every 8 (Eight) Hours As Needed for Nausea or Vomiting., Disp: 15 tablet, Rfl: 0    OBJECTIVE:    Vital Signs:   Vitals:    01/02/25 1237   BP: 152/84   Pulse: 76   Temp: 98.2 °F (36.8 °C)   SpO2: 97%   Weight: 71.1 kg (156 lb 12.8 oz)   Height: 157.5 cm (62.01\")       Physical Exam:     General Appearance:    Alert, cooperative, in no acute distress   Head:    Normocephalic, without obvious abnormality, atraumatic   Eyes:            Lids and lashes normal, conjunctivae and sclerae normal, no   icterus, no pallor, corneas clear, PERRLA   Ears:    Ears appear intact with no abnormalities noted   Throat:   No oral lesions, no thrush, oral mucosa moist   Neck:   No adenopathy, supple, trachea midline, no thyromegaly, no   carotid bruit, no JVD   Back:     No kyphosis present, no scoliosis present, no skin lesions,      erythema or scars, no tenderness to percussion or                   palpation,   range of motion normal   Lungs:     Clear to auscultation,respirations regular, even and                  unlabored    Heart:    Regular rhythm and normal rate, normal S1 " and S2, no            murmur, no gallop, no rub, no click   Chest Wall:    No abnormalities observed   Abdomen:     Normal bowel sounds, no masses, no organomegaly, soft        non-tender, non-distended, no guarding, tender in the left lower quadrant   Extremities:   Moves all extremities well, no edema, no cyanosis, no             redness   Pulses:   Pulses palpable and equal bilaterally   Skin:   No bleeding, bruising or rash   Lymph nodes:   No palpable adenopathy   Neurologic:   Cranial nerves 2 - 12 grossly intact, sensation intact, DTR       present and equal bilaterally       Results Review:   I reviewed the patient's new clinical results.  I reviewed the patient's new imaging results and agree with the interpretation.  I reviewed the patient's other test results and agree with the interpretation    Review of Systems was reviewed and confirmed as accurate as documented by the MA.    ASSESSMENT/PLAN:    1. Lower abdominal pain    2. Left lower quadrant abdominal pain        Patient needs to have a CT scan of the abdomen and pelvis with oral and IV contrast, CBC, CMP, and urinalysis and then I need to see her back in the office in follow-up.    Electronically signed by Catalino Cuevas MD  01/02/25 07:17 EST

## 2025-01-14 NOTE — PROGRESS NOTES
"Patient: Carola Resendiz    YOB: 1955    Date: 01/22/2025    Primary Care Provider: Kari Sterling APRN    Chief Complaint   Patient presents with    Follow-up     Abdominal pain/CT abdomen and pelvis        SUBJECTIVE:    History of present illness:  Patient is here for follow-up left lower quadrant pain which has been ongoing for \"a couple of weeks.\"  Patient stated that the pain was \"sharp\" in nature.  Patient had CT scan of her abdomen and pelvis performed 01/22/2025. Patient also had labs including urinalysis, CBC and CMP performed 01/02/2025    CT scan was normal, she still complains of epigastric and left upper quadrant abdominal discomfort.  She also complains of periumbilical discomfort associated with heavy lifting.      The following portions of the patient's history were reviewed and updated as appropriate: allergies, current medications, past family history, past medical history, past social history, past surgical history and problem list.      Review of Systems   Constitutional:  Negative for chills, fever and unexpected weight change.   HENT:  Negative for hearing loss, trouble swallowing and voice change.    Eyes:  Negative for visual disturbance.   Respiratory:  Negative for apnea, cough, chest tightness, shortness of breath and wheezing.    Cardiovascular:  Negative for chest pain, palpitations and leg swelling.   Gastrointestinal:  Negative for abdominal distention, abdominal pain, anal bleeding, blood in stool, constipation, diarrhea, nausea, rectal pain and vomiting.   Endocrine: Negative for cold intolerance and heat intolerance.   Genitourinary:  Negative for difficulty urinating, dysuria and flank pain.   Musculoskeletal:  Negative for back pain and gait problem.   Skin:  Negative for color change, rash and wound.   Neurological:  Negative for dizziness, syncope, speech difficulty, weakness, light-headedness, numbness and headaches.   Hematological:  Negative for " adenopathy. Does not bruise/bleed easily.   Psychiatric/Behavioral:  Negative for confusion. The patient is not nervous/anxious.        Allergies:  No Known Allergies    Medications:    Current Outpatient Medications:     albuterol sulfate  (90 Base) MCG/ACT inhaler, Inhale 2 puffs Every 4 (Four) Hours As Needed for Wheezing., Disp: 18 g, Rfl: 0    azelastine (ASTELIN) 0.1 % nasal spray, 1 spray into the nostril(s) as directed by provider 2 (Two) Times a Day As Needed for Rhinitis or Allergies. Use in each nostril as directed, Disp: 1 each, Rfl: 5    fluticasone (FLONASE) 50 MCG/ACT nasal spray, 1 spray into the nostril(s) as directed by provider Daily. Administer 1 spray in each nostril for each dose. (Patient taking differently: Administer 1 spray into the nostril(s) as directed by provider As Needed. Administer 1 spray in each nostril for each dose.), Disp: 48 g, Rfl: 2    Fluticasone-Umeclidin-Vilant (TRELEGY ELLIPTA) 200-62.5-25 MCG/ACT inhaler, Inhale 1 puff Daily., Disp: 180 each, Rfl: 2    ipratropium (ATROVENT) 0.02 % nebulizer solution, Take 2.5 mL by nebulization Every 4 (Four) Hours As Needed for Wheezing or Shortness of Air., Disp: 300 mL, Rfl: 2    levothyroxine (Synthroid) 75 MCG tablet, Take 1 tablet by mouth Daily., Disp: 90 tablet, Rfl: 3    omeprazole (priLOSEC) 40 MG capsule, TAKE 1 CAPSULE BY MOUTH DAILY, Disp: 90 capsule, Rfl: 1    ondansetron ODT (ZOFRAN-ODT) 4 MG disintegrating tablet, Place 1 tablet under the tongue Every 8 (Eight) Hours As Needed for Nausea or Vomiting., Disp: 15 tablet, Rfl: 0    History:  Past Medical History:   Diagnosis Date    Abdominal pain, RUQ     Acute bronchitis     Atypical mole     COPD (chronic obstructive pulmonary disease)     Cough     Diarrhea     Disease of thyroid gland     Edema     Elevated blood pressure reading without diagnosis of hypertension     Esophageal reflux     Fever     Full dentures     Hematochezia     Labyrinthitis, acute     Low  "back pain     Nocturnal hypoxia     Peptic ulcer     Shortness of breath     Sinusitis     Urinary frequency     UTI (urinary tract infection)     Wears glasses        Past Surgical History:   Procedure Laterality Date    APPENDECTOMY      BREAST SURGERY Right     Fibroid removed from breast     COLONOSCOPY N/A 3/4/2024    Procedure: COLONOSCOPY WITH POLYPECTOMY X2;  Surgeon: Catalino Cuevas MD;  Location: [facility] ENDOSCOPY;  Service: Gastroenterology;  Laterality: N/A;    ENDOSCOPY N/A 2020    Procedure: ESOPHAGOGASTRODUODENOSCOPY WITH BIOPSY;  Surgeon: Catalino Cuevas MD;  Location: [facility] ENDOSCOPY;  Service: Gastroenterology;  Laterality: N/A;    ENDOSCOPY N/A 2024    Procedure: ESOPHAGOGASTRODUODENOSCOPY with biopsy;  Surgeon: Catalino Cuevas MD;  Location: [facility] ENDOSCOPY;  Service: Gastroenterology;  Laterality: N/A;    HYSTERECTOMY      Partial - unsure of when       Family History   Problem Relation Age of Onset    Emphysema Mother     Heart attack Father     Prostate cancer Father     Breast cancer Neg Hx     Ovarian cancer Neg Hx        Social History     Tobacco Use    Smoking status: Former     Current packs/day: 0.00     Average packs/day: 1.5 packs/day for 25.0 years (37.5 ttl pk-yrs)     Types: Cigarettes     Start date: 12/3/1988     Quit date: 12/3/2013     Years since quittin.1     Passive exposure: Past    Smokeless tobacco: Never   Vaping Use    Vaping status: Never Used   Substance Use Topics    Alcohol use: No    Drug use: Never        OBJECTIVE:    Vital Signs:   Vitals:    25 1251   BP: 142/70   Pulse: 74   Temp: 98.2 °F (36.8 °C)   SpO2: 97%   Weight: 70.6 kg (155 lb 9.6 oz)   Height: 157.5 cm (62.01\")     Physical Exam:     General Appearance:    Alert, cooperative, in no acute distress   Head:    Normocephalic, without obvious abnormality, atraumatic   Eyes:            Lids and lashes normal, conjunctivae and sclerae normal, no   icterus, no pallor, corneas " clear, PERRLA   Ears:    Ears appear intact with no abnormalities noted   Throat:   No oral lesions, no thrush, oral mucosa moist   Neck:   No adenopathy, supple, trachea midline, no thyromegaly, no   carotid bruit, no JVD   Back:     No kyphosis present, no scoliosis present, no skin lesions,      erythema or scars, no tenderness to percussion or                   palpation,   range of motion normal   Lungs:     Clear to auscultation,respirations regular, even and                  unlabored    Heart:    Regular rhythm and normal rate, normal S1 and S2, no            murmur, no gallop, no rub, no click   Chest Wall:    No abnormalities observed   Abdomen:     Normal bowel sounds, no masses, no organomegaly, soft        non-tender, non-distended, no guarding, there is some tenderness in the epigastric region and superior to the umbilicus   Extremities:   Moves all extremities well, no edema, no cyanosis, no             redness   Pulses:   Pulses palpable and equal bilaterally   Skin:   No bleeding, bruising or rash   Lymph nodes:   No palpable adenopathy   Neurologic:   Cranial nerves 2 - 12 grossly intact, sensation intact, DTR       present and equal bilaterally         Results Review:   I reviewed the patient's new clinical results.  I reviewed the patient's new imaging results and agree with the interpretation.  I reviewed the patient's other test results and agree with the interpretation    Review of Systems was reviewed and confirmed as accurate as documented by the MA.    ASSESSMENT/PLAN:    1. Left lower quadrant abdominal pain    2. Left upper quadrant abdominal pain    3. Incarcerated hernia        I did have a detailed and extensive discussion with the patient in the office today.  Ultrasound of the anterior abdominal wall did show a very small hernia about 6 cm superior to the umbilicus and a separate periumbilical hernia that was slightly tender to palpation with the ultrasound probe.  Gallbladder  ultrasound was negative, I would get a HIDA scan on the patient and then see her back in the office in follow-up.  Certainly she is fairly symptomatic and if she continues to have symptoms she may need this ventral hernia/umbilical hernia repaired to see if this helps her other symptomatology.    I discussed the patients findings and my recommendations with patient        Electronically signed by Catalino Cuevas MD  01/22/25

## 2025-01-20 ENCOUNTER — HOSPITAL ENCOUNTER (OUTPATIENT)
Dept: CT IMAGING | Facility: HOSPITAL | Age: 70
Discharge: HOME | End: 2025-01-20
Admitting: SURGERY
Payer: MEDICARE

## 2025-01-20 DIAGNOSIS — R10.30 LOWER ABDOMINAL PAIN: ICD-10-CM

## 2025-01-20 PROCEDURE — 25510000001 IOPAMIDOL 61 % SOLUTION: Performed by: SURGERY

## 2025-01-20 PROCEDURE — 74177 CT ABD & PELVIS W/CONTRAST: CPT

## 2025-01-20 RX ORDER — IOPAMIDOL 612 MG/ML
100 INJECTION, SOLUTION INTRAVASCULAR
Status: COMPLETED | OUTPATIENT
Start: 2025-01-20 | End: 2025-01-20

## 2025-01-20 RX ADMIN — IOPAMIDOL 100 ML: 612 INJECTION, SOLUTION INTRAVENOUS at 14:56

## 2025-01-22 ENCOUNTER — OFFICE VISIT (OUTPATIENT)
Dept: SURGERY | Facility: CLINIC | Age: 70
End: 2025-01-22
Payer: MEDICARE

## 2025-01-22 VITALS
HEIGHT: 62 IN | WEIGHT: 155.6 LBS | DIASTOLIC BLOOD PRESSURE: 70 MMHG | SYSTOLIC BLOOD PRESSURE: 142 MMHG | TEMPERATURE: 98.2 F | BODY MASS INDEX: 28.63 KG/M2 | OXYGEN SATURATION: 97 % | HEART RATE: 74 BPM

## 2025-01-22 DIAGNOSIS — R10.32 LEFT LOWER QUADRANT ABDOMINAL PAIN: Primary | ICD-10-CM

## 2025-01-22 DIAGNOSIS — K46.0 INCARCERATED HERNIA: ICD-10-CM

## 2025-01-22 DIAGNOSIS — R10.12 LEFT UPPER QUADRANT ABDOMINAL PAIN: ICD-10-CM

## 2025-01-22 PROCEDURE — 1160F RVW MEDS BY RX/DR IN RCRD: CPT | Performed by: SURGERY

## 2025-01-22 PROCEDURE — 99213 OFFICE O/P EST LOW 20 MIN: CPT | Performed by: SURGERY

## 2025-01-22 PROCEDURE — 1159F MED LIST DOCD IN RCRD: CPT | Performed by: SURGERY

## 2025-02-03 ENCOUNTER — HOSPITAL ENCOUNTER (OUTPATIENT)
Dept: NUCLEAR MEDICINE | Facility: HOSPITAL | Age: 70
Discharge: HOME | End: 2025-02-03
Payer: MEDICARE

## 2025-02-03 DIAGNOSIS — K46.0 INCARCERATED HERNIA: ICD-10-CM

## 2025-02-03 DIAGNOSIS — R10.32 LEFT LOWER QUADRANT ABDOMINAL PAIN: ICD-10-CM

## 2025-02-03 DIAGNOSIS — R10.12 LEFT UPPER QUADRANT ABDOMINAL PAIN: ICD-10-CM

## 2025-02-03 PROCEDURE — 34310000005 TECHNETIUM TC 99M MEBROFENIN KIT: Performed by: SURGERY

## 2025-02-03 PROCEDURE — A9537 TC99M MEBROFENIN: HCPCS | Performed by: SURGERY

## 2025-02-03 PROCEDURE — 78227 HEPATOBIL SYST IMAGE W/DRUG: CPT

## 2025-02-03 PROCEDURE — 25010000002 SINCALIDE PER 5 MCG: Performed by: SURGERY

## 2025-02-03 RX ORDER — SINCALIDE 5 UG/5ML
0.02 INJECTION, POWDER, LYOPHILIZED, FOR SOLUTION INTRAVENOUS
Status: COMPLETED | OUTPATIENT
Start: 2025-02-03 | End: 2025-02-03

## 2025-02-03 RX ORDER — KIT FOR THE PREPARATION OF TECHNETIUM TC 99M MEBROFENIN 45 MG/10ML
1 INJECTION, POWDER, LYOPHILIZED, FOR SOLUTION INTRAVENOUS
Status: COMPLETED | OUTPATIENT
Start: 2025-02-03 | End: 2025-02-03

## 2025-02-03 RX ADMIN — SINCALIDE 1.4 MCG: 5 INJECTION, POWDER, LYOPHILIZED, FOR SOLUTION INTRAVENOUS at 10:37

## 2025-02-03 RX ADMIN — MEBROFENIN 1 DOSE: 45 INJECTION, POWDER, LYOPHILIZED, FOR SOLUTION INTRAVENOUS at 09:40

## 2025-02-03 NOTE — PROGRESS NOTES
Patient: Carola Resendiz    YOB: 1955    Date: 02/05/2025    Primary Care Provider: Kari Sterling APRN    Chief Complaint   Patient presents with    Follow-up     Hida scan        SUBJECTIVE:    History of present illness:  Patient is here for follow-up hida scan which was performed 02/03/2025, it showed an ejection fraction of 56%.  Patient is also here for follow-up ventral/umbilical hernia.    She continues to complain of midepigastric and left upper quadrant abdominal discomfort.    The following portions of the patient's history were reviewed and updated as appropriate: allergies, current medications, past family history, past medical history, past social history, past surgical history and problem list.      Review of Systems   Constitutional:  Negative for chills, fever and unexpected weight change.   HENT:  Negative for hearing loss, trouble swallowing and voice change.    Eyes:  Negative for visual disturbance.   Respiratory:  Negative for apnea, cough, chest tightness, shortness of breath and wheezing.    Cardiovascular:  Negative for chest pain, palpitations and leg swelling.   Gastrointestinal:  Negative for abdominal distention, abdominal pain, anal bleeding, blood in stool, constipation, diarrhea, nausea, rectal pain and vomiting.   Endocrine: Negative for cold intolerance and heat intolerance.   Genitourinary:  Negative for difficulty urinating, dysuria and flank pain.   Musculoskeletal:  Negative for back pain and gait problem.   Skin:  Negative for color change, rash and wound.   Neurological:  Negative for dizziness, syncope, speech difficulty, weakness, light-headedness, numbness and headaches.   Hematological:  Negative for adenopathy. Does not bruise/bleed easily.   Psychiatric/Behavioral:  Negative for confusion. The patient is not nervous/anxious.        Allergies:  No Known Allergies    Medications:    Current Outpatient Medications:     albuterol sulfate  (90  Base) MCG/ACT inhaler, Inhale 2 puffs Every 4 (Four) Hours As Needed for Wheezing., Disp: 18 g, Rfl: 0    azelastine (ASTELIN) 0.1 % nasal spray, 1 spray into the nostril(s) as directed by provider 2 (Two) Times a Day As Needed for Rhinitis or Allergies. Use in each nostril as directed, Disp: 1 each, Rfl: 5    fluticasone (FLONASE) 50 MCG/ACT nasal spray, 1 spray into the nostril(s) as directed by provider Daily. Administer 1 spray in each nostril for each dose. (Patient taking differently: Administer 1 spray into the nostril(s) as directed by provider As Needed. Administer 1 spray in each nostril for each dose.), Disp: 48 g, Rfl: 2    Fluticasone-Umeclidin-Vilant (TRELEGY ELLIPTA) 200-62.5-25 MCG/ACT inhaler, Inhale 1 puff Daily., Disp: 180 each, Rfl: 2    ipratropium (ATROVENT) 0.02 % nebulizer solution, Take 2.5 mL by nebulization Every 4 (Four) Hours As Needed for Wheezing or Shortness of Air., Disp: 300 mL, Rfl: 2    levothyroxine (Synthroid) 75 MCG tablet, Take 1 tablet by mouth Daily., Disp: 90 tablet, Rfl: 3    omeprazole (priLOSEC) 40 MG capsule, TAKE 1 CAPSULE BY MOUTH DAILY, Disp: 90 capsule, Rfl: 1    ondansetron ODT (ZOFRAN-ODT) 4 MG disintegrating tablet, Place 1 tablet under the tongue Every 8 (Eight) Hours As Needed for Nausea or Vomiting., Disp: 15 tablet, Rfl: 0    History:  Past Medical History:   Diagnosis Date    Abdominal pain, RUQ     Acute bronchitis     Atypical mole     COPD (chronic obstructive pulmonary disease)     Cough     Diarrhea     Disease of thyroid gland     Edema     Elevated blood pressure reading without diagnosis of hypertension     Esophageal reflux     Fever     Full dentures     Hematochezia     Labyrinthitis, acute     Low back pain     Nocturnal hypoxia     Peptic ulcer     Shortness of breath     Sinusitis     Urinary frequency     UTI (urinary tract infection)     Wears glasses        Past Surgical History:   Procedure Laterality Date    APPENDECTOMY      BREAST  "SURGERY Right     Fibroid removed from breast     COLONOSCOPY N/A 3/4/2024    Procedure: COLONOSCOPY WITH POLYPECTOMY X2;  Surgeon: Catalino Cuevas MD;  Location: [facility] ENDOSCOPY;  Service: Gastroenterology;  Laterality: N/A;    ENDOSCOPY N/A 2020    Procedure: ESOPHAGOGASTRODUODENOSCOPY WITH BIOPSY;  Surgeon: Catalino Cuevas MD;  Location: [facility] ENDOSCOPY;  Service: Gastroenterology;  Laterality: N/A;    ENDOSCOPY N/A 2024    Procedure: ESOPHAGOGASTRODUODENOSCOPY with biopsy;  Surgeon: Catalino Cuevas MD;  Location: [facility] ENDOSCOPY;  Service: Gastroenterology;  Laterality: N/A;    HYSTERECTOMY      Partial - unsure of when       Family History   Problem Relation Age of Onset    Emphysema Mother     Heart attack Father     Prostate cancer Father     Breast cancer Neg Hx     Ovarian cancer Neg Hx        Social History     Tobacco Use    Smoking status: Former     Current packs/day: 0.00     Average packs/day: 1.5 packs/day for 25.0 years (37.5 ttl pk-yrs)     Types: Cigarettes     Start date: 12/3/1988     Quit date: 12/3/2013     Years since quittin.1     Passive exposure: Past    Smokeless tobacco: Never   Vaping Use    Vaping status: Never Used   Substance Use Topics    Alcohol use: No    Drug use: Never        OBJECTIVE:    Vital Signs:   Vitals:    25 1342   BP: 130/76   Pulse: 83   Temp: 98.2 °F (36.8 °C)   SpO2: 96%   Weight: 70.3 kg (155 lb)   Height: 157.5 cm (62.01\")       Physical Exam:     General Appearance:    Alert, cooperative, in no acute distress   Head:    Normocephalic, without obvious abnormality, atraumatic   Eyes:            Lids and lashes normal, conjunctivae and sclerae normal, no   icterus, no pallor, corneas clear, PERRLA   Ears:    Ears appear intact with no abnormalities noted   Throat:   No oral lesions, no thrush, oral mucosa moist   Neck:   No adenopathy, supple, trachea midline, no thyromegaly, no   carotid bruit, no JVD   Back:     No kyphosis present, " no scoliosis present, no skin lesions,      erythema or scars, no tenderness to percussion or                   palpation,   range of motion normal   Lungs:     Clear to auscultation,respirations regular, even and                  unlabored    Heart:    Regular rhythm and normal rate, normal S1 and S2, no            murmur, no gallop, no rub, no click   Chest Wall:    No abnormalities observed   Abdomen:     Normal bowel sounds, no masses, no organomegaly, soft        non-tender, non-distended, no guarding,    Extremities:   Moves all extremities well, no edema, no cyanosis, no             redness   Pulses:   Pulses palpable and equal bilaterally   Skin:   No bleeding, bruising or rash   Lymph nodes:   No palpable adenopathy   Neurologic:   Cranial nerves 2 - 12 grossly intact, sensation intact, DTR       present and equal bilaterally       Results Review:   I reviewed the patient's new clinical results.  I reviewed the patient's new imaging results and agree with the interpretation.  I reviewed the patient's other test results and agree with the interpretation    Review of Systems was reviewed and confirmed as accurate as documented by the MA.    ASSESSMENT/PLAN:    1. Epigastric pain        I did have a detailed and extensive discussion with the patient in the office today.  The full risks and benefits of operative versus nonoperative intervention were discussed with the paient, they understand, agree, and wish to proceed with the surgical treatment plan of EGD.    I discussed the patients findings and my recommendations with patient.    I have told the patient that I cannot guarantee her resolution of her discomfort with laparoscopic cholecystectomy or umbilical herniorrhaphy, I will address this when I see her back in the office after upper endoscopy.        Electronically signed by Catalino Cuevas MD  02/06/25

## 2025-02-05 ENCOUNTER — OFFICE VISIT (OUTPATIENT)
Dept: SURGERY | Facility: CLINIC | Age: 70
End: 2025-02-05
Payer: MEDICARE

## 2025-02-05 VITALS
HEART RATE: 83 BPM | WEIGHT: 155 LBS | SYSTOLIC BLOOD PRESSURE: 130 MMHG | DIASTOLIC BLOOD PRESSURE: 76 MMHG | TEMPERATURE: 98.2 F | BODY MASS INDEX: 28.52 KG/M2 | HEIGHT: 62 IN | OXYGEN SATURATION: 96 %

## 2025-02-05 DIAGNOSIS — R10.13 EPIGASTRIC PAIN: Primary | ICD-10-CM

## 2025-02-05 PROCEDURE — 99213 OFFICE O/P EST LOW 20 MIN: CPT | Performed by: SURGERY

## 2025-02-05 PROCEDURE — 1160F RVW MEDS BY RX/DR IN RCRD: CPT | Performed by: SURGERY

## 2025-02-05 PROCEDURE — 1159F MED LIST DOCD IN RCRD: CPT | Performed by: SURGERY

## 2025-02-14 ENCOUNTER — OUTSIDE FACILITY SERVICE (OUTPATIENT)
Dept: SURGERY | Facility: CLINIC | Age: 70
End: 2025-02-14
Payer: MEDICARE

## 2025-02-14 PROCEDURE — 43239 EGD BIOPSY SINGLE/MULTIPLE: CPT | Performed by: SURGERY

## 2025-02-17 ENCOUNTER — PATIENT OUTREACH (OUTPATIENT)
Facility: HOSPITAL | Age: 70
End: 2025-02-17
Payer: MEDICARE

## 2025-02-17 ENCOUNTER — HOSPITAL ENCOUNTER (OUTPATIENT)
Dept: CT IMAGING | Facility: HOSPITAL | Age: 70
Discharge: HOME | End: 2025-02-17
Admitting: INTERNAL MEDICINE
Payer: MEDICARE

## 2025-02-17 DIAGNOSIS — Z87.891 PERSONAL HISTORY OF NICOTINE DEPENDENCE: ICD-10-CM

## 2025-02-17 PROCEDURE — 71271 CT THORAX LUNG CANCER SCR C-: CPT

## 2025-02-20 ENCOUNTER — OFFICE VISIT (OUTPATIENT)
Dept: SURGERY | Facility: CLINIC | Age: 70
End: 2025-02-20
Payer: MEDICARE

## 2025-02-20 VITALS
TEMPERATURE: 97.5 F | OXYGEN SATURATION: 95 % | BODY MASS INDEX: 28.34 KG/M2 | SYSTOLIC BLOOD PRESSURE: 148 MMHG | HEIGHT: 62 IN | HEART RATE: 76 BPM | DIASTOLIC BLOOD PRESSURE: 72 MMHG

## 2025-02-20 DIAGNOSIS — K46.0 INCARCERATED HERNIA: Primary | ICD-10-CM

## 2025-02-20 DIAGNOSIS — K82.8 BILIARY DYSKINESIA: ICD-10-CM

## 2025-02-20 PROCEDURE — 1160F RVW MEDS BY RX/DR IN RCRD: CPT | Performed by: SURGERY

## 2025-02-20 PROCEDURE — 1159F MED LIST DOCD IN RCRD: CPT | Performed by: SURGERY

## 2025-02-20 PROCEDURE — 99213 OFFICE O/P EST LOW 20 MIN: CPT | Performed by: SURGERY

## 2025-02-20 NOTE — PROGRESS NOTES
Patient: Carola Resendiz    YOB: 1955    Date: 02/20/2025    Primary Care Provider: Kari Sterling APRN    Chief Complaint   Patient presents with    Follow-up     EGD/epigastric pain/hernia/HIDA SCAN 56%       SUBJECTIVE:    History of present illness:  I saw the patient in the office today as a follow-up EGD which was performed 02/12/2025.  Pathology report showed chronic inactive gastritis with intestinal metaplasia.  Patient is also here for follow-up consultation for evaluation and treatment of right upper quadrant pain.  Patient had recent gallbladder ultrasound which was normal.  Hida scan was performed 02/03/2025 it showed 56% ejection fraction.    She continues to complain of right upper quadrant midepigastric abdominal discomfort associated with nausea after fatty meals.  This does seem to be getting worse in nature.  She is also had she continues to complain of right upper quadrant midepigastric abdominal discomfort associated with nausea after fatty meals.  This does seem to be getting worse in nature.  She is also had previous ultrasound that showed a small umbilical hernia and a small ventral hernia 4 cm superior to the umbilicus both of which contained omentum.    The following portions of the patient's history were reviewed and updated as appropriate: allergies, current medications, past family history, past medical history, past social history, past surgical history and problem list.        Review of Systems:  Constitutional:  Negative for chills, fever, and unexpected weight change.  HENT: Negative for trouble swallowing and voice change.  Eyes:  Negative for visual disturbance.  Respiratory:  Negative for apnea, cough, chest tightness, shortness of breath, and wheezing.  Cardiovascular:  Negative for chest pain, palpitations, and leg swelling.  Gastrointestinal:  Negative for abdominal distention, anal bleeding, blood in stool, constipation, diarrhea, rectal pain, and  vomiting.  There is a history of right upper quadrant pain and nausea  Musculoskeletal:  Negative for back pain, gait problem, and joint swelling.  Skin:  Negative for color change, rash, and wound  Neurological:  Negative for dizziness, syncope, speech difficulty, weakness, numbness, and headaches.  Hematological:  Negative for adenopathy.  Does not bruise/bleed easily.  Psychiatric/Behavioral:  Negative for confusion.  The patient is not nervous/anxious.          History:  Past Medical History:   Diagnosis Date    Abdominal pain, RUQ     Acute bronchitis     Atypical mole     COPD (chronic obstructive pulmonary disease)     Cough     Diarrhea     Disease of thyroid gland     Edema     Elevated blood pressure reading without diagnosis of hypertension     Esophageal reflux     Fever     Full dentures     Hematochezia     Labyrinthitis, acute     Low back pain     Nocturnal hypoxia     Peptic ulcer     Shortness of breath     Sinusitis     Urinary frequency     UTI (urinary tract infection)     Wears glasses        Past Surgical History:   Procedure Laterality Date    APPENDECTOMY      BREAST SURGERY Right     Fibroid removed from breast     COLONOSCOPY N/A 3/4/2024    Procedure: COLONOSCOPY WITH POLYPECTOMY X2;  Surgeon: Catalino Cuevas MD;  Location: [facility] ENDOSCOPY;  Service: Gastroenterology;  Laterality: N/A;    ENDOSCOPY N/A 06/11/2020    Procedure: ESOPHAGOGASTRODUODENOSCOPY WITH BIOPSY;  Surgeon: Catalino Cuevas MD;  Location: [facility] ENDOSCOPY;  Service: Gastroenterology;  Laterality: N/A;    ENDOSCOPY N/A 01/31/2024    Procedure: ESOPHAGOGASTRODUODENOSCOPY with biopsy;  Surgeon: Catalino Cuevas MD;  Location: [facility] ENDOSCOPY;  Service: Gastroenterology;  Laterality: N/A;    HYSTERECTOMY      Partial - unsure of when       Family History   Problem Relation Age of Onset    Emphysema Mother     Heart attack Father     Prostate cancer Father     Breast cancer Neg Hx     Ovarian cancer Neg Hx        Social  History     Tobacco Use    Smoking status: Former     Current packs/day: 0.00     Average packs/day: 1.5 packs/day for 25.0 years (37.5 ttl pk-yrs)     Types: Cigarettes     Start date: 12/3/1988     Quit date: 12/3/2013     Years since quittin.2     Passive exposure: Past    Smokeless tobacco: Never   Vaping Use    Vaping status: Never Used   Substance Use Topics    Alcohol use: No    Drug use: Never       Allergies:  No Known Allergies    Medications:    Current Outpatient Medications:     albuterol sulfate  (90 Base) MCG/ACT inhaler, Inhale 2 puffs Every 4 (Four) Hours As Needed for Wheezing., Disp: 18 g, Rfl: 0    azelastine (ASTELIN) 0.1 % nasal spray, 1 spray into the nostril(s) as directed by provider 2 (Two) Times a Day As Needed for Rhinitis or Allergies. Use in each nostril as directed, Disp: 1 each, Rfl: 5    fluticasone (FLONASE) 50 MCG/ACT nasal spray, 1 spray into the nostril(s) as directed by provider Daily. Administer 1 spray in each nostril for each dose. (Patient taking differently: Administer 1 spray into the nostril(s) as directed by provider As Needed. Administer 1 spray in each nostril for each dose.), Disp: 48 g, Rfl: 2    Fluticasone-Umeclidin-Vilant (TRELEGY ELLIPTA) 200-62.5-25 MCG/ACT inhaler, Inhale 1 puff Daily., Disp: 180 each, Rfl: 2    ipratropium (ATROVENT) 0.02 % nebulizer solution, Take 2.5 mL by nebulization Every 4 (Four) Hours As Needed for Wheezing or Shortness of Air., Disp: 300 mL, Rfl: 2    levothyroxine (Synthroid) 75 MCG tablet, Take 1 tablet by mouth Daily., Disp: 90 tablet, Rfl: 3    omeprazole (priLOSEC) 40 MG capsule, TAKE 1 CAPSULE BY MOUTH DAILY, Disp: 90 capsule, Rfl: 1    ondansetron ODT (ZOFRAN-ODT) 4 MG disintegrating tablet, Place 1 tablet under the tongue Every 8 (Eight) Hours As Needed for Nausea or Vomiting., Disp: 15 tablet, Rfl: 0    OBJECTIVE:    Vital Signs:   Vitals:    25 1409   BP: 148/72   Pulse: 76   Temp: 97.5 °F (36.4 °C)   SpO2:  "95%   Height: 157.5 cm (62.01\")       Physical Exam:     General Appearance:    Alert, cooperative, in no acute distress   Head:    Normocephalic, without obvious abnormality, atraumatic   Eyes:            Lids and lashes normal, conjunctivae and sclerae normal, no   icterus, no pallor, corneas clear, PERRLA   Ears:    Ears appear intact with no abnormalities noted   Throat:   No oral lesions, no thrush, oral mucosa moist   Neck:   No adenopathy, supple, trachea midline, no thyromegaly, no   carotid bruit, no JVD   Back:     No kyphosis present, no scoliosis present, no skin lesions,      erythema or scars, no tenderness to percussion or                   palpation,   range of motion normal   Lungs:     Clear to auscultation,respirations regular, even and                  unlabored    Heart:    Regular rhythm and normal rate, normal S1 and S2, no            murmur, no gallop, no rub, no click   Chest Wall:    No abnormalities observed   Abdomen:     Normal bowel sounds, no masses, no organomegaly, soft        tender in the right upper quadrant, non-distended, no guarding, there is evidence of right upper quadrant tenderness   Extremities:   Moves all extremities well, no edema, no cyanosis, no             redness   Pulses:   Pulses palpable and equal bilaterally   Skin:   No bleeding, bruising or rash   Lymph nodes:   No palpable adenopathy   Neurologic:   Cranial nerves 2 - 12 grossly intact, sensation intact, DTR       present and equal bilaterally                 Results Review:   I reviewed the patient's new clinical results.  I reviewed the patient's new imaging results and agree with the interpretation.  I reviewed the patient's other test results and agree with the interpretation    Review of Systems was reviewed and confirmed as accurate as documented by the MA.    ASSESSMENT/PLAN:    1. Incarcerated hernia    2. Biliary dyskinesia          I did have a detailed and extensive discussion with the patient in " the office today and they understand that they need to undergo laparoscopic cholecystectomy with intraoperative cholangiography and/or immunofluorescence or possible open cholecystectomy. Full risks and benefits of operative versus nonoperative intervention were discussed with the patient and these included things such as nonresolution of symptoms and possible worsening of symptoms without surgical intervention versus infection, bleeding, open cholecystectomy, common bile duct injury, postoperative biliary leakage, need for drain placement, possible inability to perform cholangiography due to inflammation, postoperative abscess, etc with surgical intervention. The patient understands, agrees, and wishes to proceed with the surgical treatment plan as mentioned above. The patient had no questions for me at the end of the discussion.  I did draw a picture of the anatomy for the patient and used this in my informed consent.     I discussed the patients findings and my recommendations with patient.    Electronically signed by Catalino Cuevas MD  02/20/25

## 2025-02-20 NOTE — PROGRESS NOTES
Patient: Carola Resendiz    YOB: 1955    Date: 02/20/2025    Primary Care Provider: Kari Sterling APRN    Chief Complaint   Patient presents with    Follow-up     EGD/epigastric pain/hernia/HIDA SCAN 56%       SUBJECTIVE:    History of present illness:  I saw the patient in the office today as a consultation for evaluation and treatment of ***    {Hx  Review:57974}    ***    History:  Past Medical History:   Diagnosis Date    Abdominal pain, RUQ     Acute bronchitis     Atypical mole     COPD (chronic obstructive pulmonary disease)     Cough     Diarrhea     Disease of thyroid gland     Edema     Elevated blood pressure reading without diagnosis of hypertension     Esophageal reflux     Fever     Full dentures     Hematochezia     Labyrinthitis, acute     Low back pain     Nocturnal hypoxia     Peptic ulcer     Shortness of breath     Sinusitis     Urinary frequency     UTI (urinary tract infection)     Wears glasses        Past Surgical History:   Procedure Laterality Date    APPENDECTOMY      BREAST SURGERY Right     Fibroid removed from breast     COLONOSCOPY N/A 3/4/2024    Procedure: COLONOSCOPY WITH POLYPECTOMY X2;  Surgeon: Catalino Cuevas MD;  Location: [facility] ENDOSCOPY;  Service: Gastroenterology;  Laterality: N/A;    ENDOSCOPY N/A 06/11/2020    Procedure: ESOPHAGOGASTRODUODENOSCOPY WITH BIOPSY;  Surgeon: Catalino Cuevas MD;  Location: [facility] ENDOSCOPY;  Service: Gastroenterology;  Laterality: N/A;    ENDOSCOPY N/A 01/31/2024    Procedure: ESOPHAGOGASTRODUODENOSCOPY with biopsy;  Surgeon: Catalino Cuevas MD;  Location: [facility] ENDOSCOPY;  Service: Gastroenterology;  Laterality: N/A;    HYSTERECTOMY      Partial - unsure of when       Family History   Problem Relation Age of Onset    Emphysema Mother     Heart attack Father     Prostate cancer Father     Breast cancer Neg Hx     Ovarian cancer Neg Hx        Social History     Tobacco Use    Smoking status: Former     Current  "packs/day: 0.00     Average packs/day: 1.5 packs/day for 25.0 years (37.5 ttl pk-yrs)     Types: Cigarettes     Start date: 12/3/1988     Quit date: 12/3/2013     Years since quittin.2     Passive exposure: Past    Smokeless tobacco: Never   Vaping Use    Vaping status: Never Used   Substance Use Topics    Alcohol use: No    Drug use: Never       Allergies:  No Known Allergies    Medications:    Current Outpatient Medications:     albuterol sulfate  (90 Base) MCG/ACT inhaler, Inhale 2 puffs Every 4 (Four) Hours As Needed for Wheezing., Disp: 18 g, Rfl: 0    azelastine (ASTELIN) 0.1 % nasal spray, 1 spray into the nostril(s) as directed by provider 2 (Two) Times a Day As Needed for Rhinitis or Allergies. Use in each nostril as directed, Disp: 1 each, Rfl: 5    fluticasone (FLONASE) 50 MCG/ACT nasal spray, 1 spray into the nostril(s) as directed by provider Daily. Administer 1 spray in each nostril for each dose. (Patient taking differently: Administer 1 spray into the nostril(s) as directed by provider As Needed. Administer 1 spray in each nostril for each dose.), Disp: 48 g, Rfl: 2    Fluticasone-Umeclidin-Vilant (TRELEGY ELLIPTA) 200-62.5-25 MCG/ACT inhaler, Inhale 1 puff Daily., Disp: 180 each, Rfl: 2    ipratropium (ATROVENT) 0.02 % nebulizer solution, Take 2.5 mL by nebulization Every 4 (Four) Hours As Needed for Wheezing or Shortness of Air., Disp: 300 mL, Rfl: 2    levothyroxine (Synthroid) 75 MCG tablet, Take 1 tablet by mouth Daily., Disp: 90 tablet, Rfl: 3    omeprazole (priLOSEC) 40 MG capsule, TAKE 1 CAPSULE BY MOUTH DAILY, Disp: 90 capsule, Rfl: 1    ondansetron ODT (ZOFRAN-ODT) 4 MG disintegrating tablet, Place 1 tablet under the tongue Every 8 (Eight) Hours As Needed for Nausea or Vomiting., Disp: 15 tablet, Rfl: 0    OBJECTIVE:    Vital Signs:   Vitals:    25 1409   BP: 148/72   Pulse: 76   Temp: 97.5 °F (36.4 °C)   SpO2: 95%   Height: 157.5 cm (62.01\")         ***      Results " "Review:   {Results Review:47713::\"I reviewed the patient's new clinical results.\"}    {ROS review:97003}    ASSESSMENT/PLAN:    1. Incarcerated hernia    2. Biliary dyskinesia        ***    Electronically signed by Catalino Cuevas MD  02/20/25            "

## 2025-02-27 ENCOUNTER — OFFICE VISIT (OUTPATIENT)
Dept: PULMONOLOGY | Facility: CLINIC | Age: 70
End: 2025-02-27
Payer: MEDICARE

## 2025-02-27 VITALS
DIASTOLIC BLOOD PRESSURE: 68 MMHG | HEIGHT: 62 IN | HEART RATE: 72 BPM | OXYGEN SATURATION: 95 % | RESPIRATION RATE: 18 BRPM | WEIGHT: 155 LBS | BODY MASS INDEX: 28.52 KG/M2 | SYSTOLIC BLOOD PRESSURE: 152 MMHG

## 2025-02-27 DIAGNOSIS — Z87.891 PERSONAL HISTORY OF NICOTINE DEPENDENCE: ICD-10-CM

## 2025-02-27 DIAGNOSIS — J44.9 CHRONIC OBSTRUCTIVE PULMONARY DISEASE, UNSPECIFIED COPD TYPE: Primary | ICD-10-CM

## 2025-02-27 DIAGNOSIS — G47.34 NOCTURNAL HYPOXIA: ICD-10-CM

## 2025-02-27 PROCEDURE — 99214 OFFICE O/P EST MOD 30 MIN: CPT | Performed by: NURSE PRACTITIONER

## 2025-02-27 NOTE — PROGRESS NOTES
"Chief Complaint   Patient presents with    Breathing Problem    Follow-up         Subjective   Carola Resendiz is a 69 y.o. female.   Patient comes today for follow up of shortness of breath and COPD.     Symptoms have been stable since the last clinic visit. Patient reports no recent exacerbations.     Patient is using medications, as prescribed. She uses Trelegy daily. She has not needed DREW.     She uses flonase as needed. She is not using Astelin.     Exercise tolerance has also remained stable.     She uses oxygen at night.    Quit smoking 12 years ago.       The following portions of the patient's history were reviewed and updated as appropriate: allergies, current medications, past family history, past medical history, past social history, and past surgical history.    Review of Systems   HENT:  Positive for sinus pressure and sinus pain.    Respiratory:  Positive for wheezing.    Psychiatric/Behavioral:  Positive for sleep disturbance.        Objective   Visit Vitals  /68   Pulse 72   Resp 18   Ht 157.5 cm (62.01\")   Wt 70.3 kg (155 lb)   SpO2 95%   BMI 28.34 kg/m²     Physical Exam  Vitals reviewed.   HENT:      Head: Atraumatic.      Mouth/Throat:      Mouth: Mucous membranes are moist.   Eyes:      Extraocular Movements: Extraocular movements intact.   Cardiovascular:      Rate and Rhythm: Normal rate and regular rhythm.   Pulmonary:      Effort: Pulmonary effort is normal. No respiratory distress.      Comments: Somewhat decreased A/E without wheezing.   Skin:     General: Skin is warm.   Neurological:      Mental Status: She is alert and oriented to person, place, and time.           Assessment & Plan   Diagnoses and all orders for this visit:    1. Chronic obstructive pulmonary disease, unspecified COPD type (Primary)    2. Nocturnal hypoxia    3. Personal history of nicotine dependence    Other orders  -     Fluticasone-Umeclidin-Vilant (TRELEGY ELLIPTA) 200-62.5-25 MCG/ACT inhaler; Inhale 1 " puff Daily.  Dispense: 180 each; Refill: 2           Return in about 8 months (around 10/27/2025) for Recheck, For Dr. Lin.    DISCUSSION (if any):  PFT 2023 consistent with severe obstruction.     She is scheduled for cholecystectomy tomorrow.     Continue oxygen at night. O2 through [employer].    No change to the current medications has been made.  Continue Trelegy daily and the rescue medication as directed.    Compliance with medications stressed.     Side effects of prescribed medications discussed with the patient        The patient belongs to the risk group for which lung cancer screening has been recommended.  She will need to continue annual low-dose CT screening which will be due February 2026.    I reviewed CT from last week which shows no suspicious nodules.    Study Result    Narrative & Impression   PROCEDURE: CT CHEST LOW DOSE CANCER SCREENING WO-     HISTORY: Lung cancer screening, >= 20 pk-yr smoking history (Age >=  50y); Z87.891-Personal history of nicotine dependence     COMPARISON: 2/13/2024     TECHNIQUE: Axial images were obtained from the thoracic inlet through  the upper abdomen per the low-dose protocol. Coronal images were  reformatted. DLP 1.04; CTDI 38.9     FINDINGS: There is scarring within the posterior right lung apex,  unchanged from prior. There is evidence of old calcified granulomatous  disease. There is stable linear scarring. There is no airspace disease.  The heart is normal in size. The aorta is normal in caliber. There are  no pleural or pericardial effusions. The visualized upper abdomen is  unremarkable. Bone windows reveal no acute osseous abnormalities.     IMPRESSION:  Stable exam without evidence of neoplasm.     Lung RADS category 1.     RECOMMENDATIONS:Annual low-dose chest CT.           Images were reviewed, interpreted, and dictated by Dr. Kentrell Em MD  Transcribed by Beni Valdez PA-C.     This report was signed and finalized on 2/17/2025 12:43 PM by  Kentrell Em MD.           Dictated utilizing Dragon dictation.    This document was electronically signed by MITCHEL Heller February 27, 2025  10:22 EST

## 2025-02-28 ENCOUNTER — OUTSIDE FACILITY SERVICE (OUTPATIENT)
Dept: SURGERY | Facility: CLINIC | Age: 70
End: 2025-02-28
Payer: MEDICARE

## 2025-02-28 DIAGNOSIS — K82.8 BILIARY DYSKINESIA: Primary | ICD-10-CM

## 2025-02-28 RX ORDER — HYDROCODONE BITARTRATE AND ACETAMINOPHEN 7.5; 325 MG/1; MG/1
1 TABLET ORAL EVERY 6 HOURS PRN
Qty: 15 TABLET | Refills: 0 | Status: SHIPPED | OUTPATIENT
Start: 2025-02-28

## 2025-03-02 DIAGNOSIS — E03.8 OTHER SPECIFIED HYPOTHYROIDISM: ICD-10-CM

## 2025-03-03 RX ORDER — LEVOTHYROXINE SODIUM 75 UG/1
75 TABLET ORAL DAILY
Qty: 90 TABLET | Refills: 3 | Status: SHIPPED | OUTPATIENT
Start: 2025-03-03

## 2025-03-03 NOTE — TELEPHONE ENCOUNTER
Rx Refill Note  Requested Prescriptions     Pending Prescriptions Disp Refills    levothyroxine (SYNTHROID, LEVOTHROID) 75 MCG tablet [Pharmacy Med Name: LEVOTHYROXINE 0.075MG (75MCG) TABS] 90 tablet 3     Sig: TAKE 1 TABLET BY MOUTH DAILY      Last office visit with prescribing clinician: 1/11/2024   Last telemedicine visit with prescribing clinician: Visit date not found   Next office visit with prescribing clinician: Visit date not found                         Would you like a call back once the refill request has been completed: [] Yes [] No    If the office needs to give you a call back, can they leave a voicemail: [] Yes [] No    Stan Momin, [facility]  03/03/25, 09:04 EST

## 2025-03-11 NOTE — PROGRESS NOTES
"Patient: Carola Resendiz    YOB: 1955    Date: 03/13/2025    Primary Care Provider: Kari Sterling APRN    Chief Complaint   Patient presents with    Post-op     laparoscopic cholecystectomy       History of present illness:  I saw the patient in the office today as a followup from their recent laparoscopic cholecystectomy which was performed 02/28/2025.  Pathology report showed chronic cholecystitis. They state that they have done well and are having no complaints.    Vital Signs:   Vitals:    03/13/25 1414   BP: 142/68   Pulse: 77   Resp: 18   Temp: 98.2 °F (36.8 °C)   TempSrc: Temporal   SpO2: 96%   Weight: 70.3 kg (155 lb)   Height: 157.5 cm (62\")     Physical Exam:     General : no acute distress  Chest : clear bilaterally  COR : regular rate and rhythm  ABD :  soft nontender, all incisions healed nicely      Assessment / Plan :    1. Post-operative state        I did discuss the situation with the patient today in the office and they have done well from their recent laparoscopic cholecystectomy.  I have released the patient back to normal activity, they understand that they need to be careful about heavy lifting.  I need to see the patient back in the office only if they are having further problems, they know to call me if they are.    Electronically signed by Catalino Cuevas MD  03/13/25                "

## 2025-03-13 ENCOUNTER — OFFICE VISIT (OUTPATIENT)
Dept: SURGERY | Facility: CLINIC | Age: 70
End: 2025-03-13
Payer: MEDICARE

## 2025-03-13 VITALS
HEIGHT: 62 IN | HEART RATE: 77 BPM | BODY MASS INDEX: 28.52 KG/M2 | SYSTOLIC BLOOD PRESSURE: 142 MMHG | OXYGEN SATURATION: 96 % | RESPIRATION RATE: 18 BRPM | TEMPERATURE: 98.2 F | DIASTOLIC BLOOD PRESSURE: 68 MMHG | WEIGHT: 155 LBS

## 2025-03-13 DIAGNOSIS — Z98.890 POST-OPERATIVE STATE: Primary | ICD-10-CM

## 2025-04-10 ENCOUNTER — OFFICE VISIT (OUTPATIENT)
Dept: SURGERY | Facility: CLINIC | Age: 70
End: 2025-04-10
Payer: MEDICARE

## 2025-04-10 VITALS
BODY MASS INDEX: 27.79 KG/M2 | DIASTOLIC BLOOD PRESSURE: 82 MMHG | TEMPERATURE: 98.2 F | SYSTOLIC BLOOD PRESSURE: 152 MMHG | OXYGEN SATURATION: 95 % | HEIGHT: 62 IN | WEIGHT: 151 LBS | HEART RATE: 86 BPM

## 2025-04-10 DIAGNOSIS — Z98.890 POST-OPERATIVE STATE: Primary | ICD-10-CM

## 2025-04-10 NOTE — PROGRESS NOTES
"Patient: Carola Resendiz    YOB: 1955    Date: 04/10/2025    Primary Care Provider: Kari Sterling APRN    Chief Complaint   Patient presents with    Post-op Follow-up     LAP LUCILLE 2/28/25  C/O NONHEALING INCISION        History of present illness:  I saw the patient in the office today as a followup from their recent laparoscopic cholecystectomy which was performed 02/28/2025.  They state that they have done well although they have an incision which is not healing well and \"it feels like something is coming-out of incision\".    Vital Signs:   Vitals:    04/10/25 1400   BP: 152/82   Pulse: 86   Temp: 98.2 °F (36.8 °C)   TempSrc: Infrared   SpO2: 95%   Weight: 68.5 kg (151 lb)   Height: 157.5 cm (62.01\")     Physical Exam:     General : no acute distress  Chest : clear bilaterally  COR : regular rate and rhythm  ABD :  soft nontender, all incisions healed nicely      Assessment / Plan :    1. Post-operative state        I did discuss the situation with the patient today in the office and they have done well from their recent laparoscopic cholecystectomy.  I have released the patient back to normal activity, they understand that they need to be careful about heavy lifting.  I need to see the patient back in the office only if they are having further problems, they know to call me if they are.    I did remove a very small Vicryl suture from the most lateral 5 mm incision today in the office, there is no signs of infection.    Electronically signed by Catalino Cuevas MD  04/10/25                "

## 2025-04-14 ENCOUNTER — PATIENT ROUNDING (BHMG ONLY) (OUTPATIENT)
Dept: URGENT CARE | Facility: CLINIC | Age: 70
End: 2025-04-14
Payer: MEDICARE

## 2025-04-24 DIAGNOSIS — K21.9 GASTROESOPHAGEAL REFLUX DISEASE, UNSPECIFIED WHETHER ESOPHAGITIS PRESENT: ICD-10-CM

## 2025-04-25 RX ORDER — OMEPRAZOLE 40 MG/1
40 CAPSULE, DELAYED RELEASE ORAL DAILY
Qty: 30 CAPSULE | Refills: 0 | Status: SHIPPED | OUTPATIENT
Start: 2025-04-25

## 2025-05-02 ENCOUNTER — PATIENT ROUNDING (BHMG ONLY) (OUTPATIENT)
Dept: URGENT CARE | Facility: CLINIC | Age: 70
End: 2025-05-02
Payer: MEDICARE

## 2025-05-15 ENCOUNTER — OFFICE VISIT (OUTPATIENT)
Dept: INTERNAL MEDICINE | Facility: CLINIC | Age: 70
End: 2025-05-15
Payer: MEDICARE

## 2025-05-15 VITALS
OXYGEN SATURATION: 94 % | WEIGHT: 151 LBS | BODY MASS INDEX: 27.79 KG/M2 | DIASTOLIC BLOOD PRESSURE: 74 MMHG | HEIGHT: 62 IN | TEMPERATURE: 98.2 F | SYSTOLIC BLOOD PRESSURE: 150 MMHG | HEART RATE: 71 BPM

## 2025-05-15 DIAGNOSIS — K21.9 GASTROESOPHAGEAL REFLUX DISEASE, UNSPECIFIED WHETHER ESOPHAGITIS PRESENT: ICD-10-CM

## 2025-05-15 DIAGNOSIS — E55.9 VITAMIN D INSUFFICIENCY: ICD-10-CM

## 2025-05-15 DIAGNOSIS — Z12.31 ENCOUNTER FOR SCREENING MAMMOGRAM FOR MALIGNANT NEOPLASM OF BREAST: ICD-10-CM

## 2025-05-15 DIAGNOSIS — E03.8 OTHER SPECIFIED HYPOTHYROIDISM: ICD-10-CM

## 2025-05-15 DIAGNOSIS — E78.2 MIXED HYPERLIPIDEMIA: ICD-10-CM

## 2025-05-15 DIAGNOSIS — R73.9 ELEVATED BLOOD SUGAR: ICD-10-CM

## 2025-05-15 DIAGNOSIS — R79.89 ABNORMAL CBC: ICD-10-CM

## 2025-05-15 DIAGNOSIS — Z00.00 MEDICARE ANNUAL WELLNESS VISIT, SUBSEQUENT: Primary | ICD-10-CM

## 2025-05-15 DIAGNOSIS — E53.8 B12 DEFICIENCY: ICD-10-CM

## 2025-05-15 DIAGNOSIS — K21.9 GASTROESOPHAGEAL REFLUX DISEASE WITHOUT ESOPHAGITIS: ICD-10-CM

## 2025-05-15 DIAGNOSIS — R53.83 FATIGUE, UNSPECIFIED TYPE: ICD-10-CM

## 2025-05-15 RX ORDER — LEVOTHYROXINE SODIUM 75 UG/1
75 TABLET ORAL DAILY
Qty: 90 TABLET | Refills: 3 | Status: SHIPPED | OUTPATIENT
Start: 2025-05-15

## 2025-05-15 RX ORDER — OMEPRAZOLE 40 MG/1
40 CAPSULE, DELAYED RELEASE ORAL DAILY
Qty: 90 CAPSULE | Refills: 3 | Status: SHIPPED | OUTPATIENT
Start: 2025-05-15

## 2025-05-15 NOTE — PROGRESS NOTES
Subjective   The ABCs of the Annual Wellness Visit  Medicare Wellness Visit      Carola Resendiz is a 69 y.o. patient who presents for a Medicare Wellness Visit.    The following portions of the patient's history were reviewed and   updated as appropriate: allergies, current medications, past family history, past medical history, past social history, past surgical history, and problem list.    Compared to one year ago, the patient's physical   health is worse.  Compared to one year ago, the patient's mental   health is the same.    Recent Hospitalizations:  She was not admitted to the hospital during the last year.     Current Medical Providers:  Patient Care Team:  Kari Sterling APRN as PCP - General (Family Medicine)  Catalino Cuevas MD as Consulting Physician (General Surgery)    Outpatient Medications Prior to Visit   Medication Sig Dispense Refill    albuterol sulfate  (90 Base) MCG/ACT inhaler Inhale 2 puffs Every 4 (Four) Hours As Needed for Wheezing. 18 g 0    azelastine (ASTELIN) 0.1 % nasal spray 1 spray into the nostril(s) as directed by provider 2 (Two) Times a Day As Needed for Rhinitis or Allergies. Use in each nostril as directed 1 each 5    fluticasone (FLONASE) 50 MCG/ACT nasal spray 1 spray into the nostril(s) as directed by provider Daily. Administer 1 spray in each nostril for each dose. (Patient taking differently: Administer 1 spray into the nostril(s) as directed by provider As Needed. Administer 1 spray in each nostril for each dose.) 48 g 2    ipratropium (ATROVENT) 0.02 % nebulizer solution Take 2.5 mL by nebulization Every 4 (Four) Hours As Needed for Wheezing or Shortness of Air. 300 mL 2    ondansetron ODT (ZOFRAN-ODT) 4 MG disintegrating tablet Place 1 tablet under the tongue Every 8 (Eight) Hours As Needed for Nausea or Vomiting. 15 tablet 0    levothyroxine (SYNTHROID, LEVOTHROID) 75 MCG tablet TAKE 1 TABLET BY MOUTH DAILY 90 tablet 3    omeprazole (priLOSEC) 40 MG  "capsule TAKE 1 CAPSULE BY MOUTH DAILY 30 capsule 0     No facility-administered medications prior to visit.     No opioid medication identified on active medication list. I have reviewed chart for other potential  high risk medication/s and harmful drug interactions in the elderly.      Aspirin is not on active medication list.  Aspirin use is not indicated based on review of current medical condition/s. Risk of harm outweighs potential benefits.  .    Patient Active Problem List   Diagnosis    Chronic obstructive pulmonary disease    Gastroesophageal reflux disease without esophagitis    Hypothyroidism    Peptic ulcer    Screening mammogram, encounter for    Environmental allergies    Bilateral hip pain    Encounter for Medicare annual wellness exam    Mixed hyperlipidemia    Elevated blood sugar    Overweight (BMI 25.0-29.9)    Screening for osteoporosis    Menopause    Allergic contact dermatitis due to plants, except food    Epigastric pain    BPPV (benign paroxysmal positional vertigo)    Loss of balance    History of colon polyps     Advance Care Planning Advance Directive is not on file.  ACP discussion was declined by the patient. Patient does not have an advance directive, declines further assistance.            Objective   Vitals:    05/15/25 0955   BP: 150/54   Pulse: 71   Temp: 98.2 °F (36.8 °C)   SpO2: 94%   Weight: 68.5 kg (151 lb)   Height: 157.5 cm (62.01\")   PainSc: 0-No pain       Estimated body mass index is 27.61 kg/m² as calculated from the following:    Height as of this encounter: 157.5 cm (62.01\").    Weight as of this encounter: 68.5 kg (151 lb).    BMI is >= 25 and <30. (Overweight) The following options were offered after discussion;: exercise counseling/recommendations and nutrition counseling/recommendations           Does the patient have evidence of cognitive impairment? No                                                                                                Health  Risk " Assessment    Smoking Status:  Social History     Tobacco Use   Smoking Status Former    Current packs/day: 0.00    Average packs/day: 1.5 packs/day for 25.0 years (37.5 ttl pk-yrs)    Types: Cigarettes    Start date: 12/3/1988    Quit date: 12/3/2013    Years since quittin.4    Passive exposure: Past   Smokeless Tobacco Never     Alcohol Consumption:  Social History     Substance and Sexual Activity   Alcohol Use No       Fall Risk Screen  STEADI Fall Risk Assessment was completed, and patient is at LOW risk for falls.Assessment completed on:5/15/2025    Depression Screening   Little interest or pleasure in doing things? Not at all   Feeling down, depressed, or hopeless? Not at all   PHQ-2 Total Score 0      Health Habits and Functional and Cognitive Screenin/15/2025     9:50 AM   Functional & Cognitive Status   Do you have difficulty preparing food and eating? No   Do you have difficulty bathing yourself, getting dressed or grooming yourself? No   Do you have difficulty using the toilet? No   Do you have difficulty moving around from place to place? No   Do you have trouble with steps or getting out of a bed or a chair? Yes   Current Diet Well Balanced Diet   Dental Exam Up to date   Eye Exam Up to date   Exercise (times per week) 4 times per week   Current Exercises Include House Cleaning;Walking;Yard Work   Do you need help using the phone?  No   Are you deaf or do you have serious difficulty hearing?  No   Do you need help to go to places out of walking distance? No   Do you need help shopping? No   Do you need help preparing meals?  No   Do you need help with housework?  No   Do you need help with laundry? No   Do you need help taking your medications? No   Do you need help managing money? No   Do you ever drive or ride in a car without wearing a seat belt? No   Have you felt unusual stress, anger or loneliness in the last month? No   Who do you live with? Spouse   If you need help, do you have  trouble finding someone available to you? No   Have you been bothered in the last four weeks by sexual problems? No   Do you have difficulty concentrating, remembering or making decisions? Yes           Age-appropriate Screening Schedule:  Refer to the list below for future screening recommendations based on patient's age, sex and/or medical conditions. Orders for these recommended tests are listed in the plan section. The patient has been provided with a written plan.    Health Maintenance List  Health Maintenance   Topic Date Due    Pneumococcal Vaccine 50+ (2 of 2 - PCV) 03/18/2023    DXA SCAN  05/19/2024    ANNUAL WELLNESS VISIT  07/12/2024    LIPID PANEL  07/14/2024    COVID-19 Vaccine (3 - 2024-25 season) 05/29/2025 (Originally 9/1/2024)    ZOSTER VACCINE (2 of 2) 09/30/2025 (Originally 6/1/2017)    INFLUENZA VACCINE  07/01/2025    MAMMOGRAM  09/05/2025    LUNG CANCER SCREENING  02/17/2026    COLORECTAL CANCER SCREENING  03/04/2027    TDAP/TD VACCINES (2 - Td or Tdap) 04/06/2027    HEPATITIS C SCREENING  Completed                                                                                                                                                CMS Preventative Services Quick Reference  Risk Factors Identified During Encounter  Health maintenance components discussed along with recommendations    The above risks/problems have been discussed with the patient.  Pertinent information has been shared with the patient in the After Visit Summary.  An After Visit Summary and PPPS were made available to the patient.    Follow Up:   Next Medicare Wellness visit to be scheduled in 1 year.         Additional E&M Note during same encounter follows:  Patient has additional, significant, and separately identifiable condition(s)/problem(s) that require work above and beyond the Medicare Wellness Visit     Chief Complaint  Medicare Wellness-subsequent    Subjective    HPI  Carola is also being seen today for an  annual adult preventative physical exam.        The patient presents for a Medicare wellness subsequent visit.    She reports fluctuating blood pressure levels at home, with a recent episode of elevated blood pressure during an urgent care visit for a urinary tract infection (UTI) approximately 2 weeks ago. Her blood pressure was recorded as 177, but it decreased upon re-evaluation before her departure from the urgent care facility. She does not monitor her blood pressure regularly at home. She is not experiencing any chest pain or shortness of breath.    She has a history of B12 deficiency and expresses interest in having her iron levels checked. Despite maintaining an active lifestyle, she experiences fatigue. She perceives a slight decline in her physical health compared to the previous year, but her mental health remains stable. She does not endorse any feelings of depression. She has not experienced any falls or unusual incidents. She does not have a living will and declines information on the same. Her family is aware of her wishes.    She has been diagnosed with hypothyroidism and is currently on thyroid medication. She requests a refill of her thyroid medication.    She has been diagnosed with gastroesophageal reflux disease (GERD) and is currently on omeprazole. She requests a refill of her omeprazole prescription.    She has undergone a colonoscopy in 03/2024 and is due for a repeat procedure in 03/2027. She has received the pneumonia vaccine twice and declines a third dose. She has an upcoming ophthalmology appointment next week. She has dentures in both her upper and lower jaws. She does not see a dermatologist. She has not had a DEXA scan recently and does not wish to have one. She is uncertain about the date of her last mammogram and requests an order for the same.    She has a history of cholecystectomy but continues to experience gastrointestinal discomfort. She has not attempted to increase her  "dietary fiber intake.    PAST SURGICAL HISTORY:  - Cholecystectomy          Objective   Vital Signs:  /54   Pulse 71   Temp 98.2 °F (36.8 °C)   Ht 157.5 cm (62.01\")   Wt 68.5 kg (151 lb)   SpO2 94%   BMI 27.61 kg/m²   Physical Exam  Vitals and nursing note reviewed.   Constitutional:       General: She is not in acute distress.     Appearance: Normal appearance. She is well-developed.   HENT:      Head: Normocephalic and atraumatic.      Right Ear: Hearing, tympanic membrane and ear canal normal.      Left Ear: Hearing, tympanic membrane and ear canal normal.      Nose: Nose normal.      Mouth/Throat:      Mouth: Mucous membranes are dry.      Dentition: Normal dentition.   Eyes:      Conjunctiva/sclera: Conjunctivae normal.      Pupils: Pupils are equal, round, and reactive to light.   Neck:      Thyroid: No thyroid mass or thyromegaly.      Vascular: No carotid bruit or JVD.   Cardiovascular:      Rate and Rhythm: Normal rate and regular rhythm.      Pulses: Normal pulses.      Heart sounds: Normal heart sounds, S1 normal and S2 normal. No murmur heard.  Pulmonary:      Effort: Pulmonary effort is normal.      Breath sounds: Normal breath sounds.   Abdominal:      General: Bowel sounds are normal. There is no distension or abdominal bruit.      Palpations: Abdomen is soft. There is no mass.      Tenderness: There is no abdominal tenderness. There is no right CVA tenderness, left CVA tenderness, guarding or rebound.      Hernia: No hernia is present.   Genitourinary:     Comments: deferred  Musculoskeletal:         General: Tenderness and deformity present. Normal range of motion.      Cervical back: Normal range of motion and neck supple.   Lymphadenopathy:      Cervical: No cervical adenopathy.   Skin:     General: Skin is warm and dry.      Capillary Refill: Capillary refill takes less than 2 seconds.      Findings: No rash.      Nails: There is no clubbing.   Neurological:      Mental Status: She is " alert and oriented to person, place, and time.      Cranial Nerves: No cranial nerve deficit.      Sensory: No sensory deficit.      Gait: Gait normal.   Psychiatric:         Behavior: Behavior normal.         Thought Content: Thought content normal.         Judgment: Judgment normal.           Respiratory: Clear to auscultation, no wheezing, rales or rhonchi  Cardiovascular: Regular rate and rhythm, no murmurs, rubs, or gallops  Gastrointestinal: Mild tenderness noted upon palpation            Results  Labs   - Cholesterol: 219 mg/dL   - LDL: 130 mg/dL           Assessment and Plan        1. Hypertension.  - Blood pressure was elevated during today's visit at 150/54.  - Advised to monitor blood pressure and heart rate daily, once or twice depending on how she feels.  - Recheck of blood pressure will be conducted before departure today.  - If blood pressure remains high, contact the office.    2. Hypothyroidism.  - TSH levels were within the normal range during the last assessment.  - Prescription refill for thyroid medication will be provided.  - Dosage may be adjusted based on future lab results.  - Fasting blood work to be conducted to assess thyroid function.    3. Gastroesophageal Reflux Disease (GERD).  - Prescription refill for omeprazole will be provided.  - Advised to maintain a well-balanced diet.  - Benefiber recommended to help lower cholesterol levels and bulk up stool.    4. Health Maintenance.  - Mammogram ordered, last one was in 09/2023.  - Fasting blood work to assess cholesterol, A1c, B12, and white blood cell count.  - Advised to wait at least 5 days post-antibiotic treatment before undergoing blood work.  - Encouraged to maintain a well-balanced diet and incorporate Benefiber into diet once daily or every other day.    5. B12 Deficiency.  - History of B12 deficiency.  - B12 levels will be checked during upcoming blood work.  - Advised to ensure proper hygiene by wiping from front to back.    6.  Urinary Tract Infection (UTI).  - Had a UTI about 2 weeks ago and completed antibiotics course 3-4 days ago.  - Advised to ensure proper hygiene by wiping from front to back.  - Blood work to be conducted to check for any residual infection.         Follow Up   No follow-ups on file.  Patient was given instructions and counseling regarding her condition or for health maintenance advice. Please see specific information pulled into the AVS if appropriate.  Patient or patient representative verbalized consent for the use of Ambient Listening during the visit with  MITCHEL Ortiz for chart documentation.

## 2025-05-17 ENCOUNTER — LAB (OUTPATIENT)
Dept: LAB | Facility: HOSPITAL | Age: 70
End: 2025-05-17
Payer: MEDICARE

## 2025-05-17 LAB
25(OH)D3 SERPL-MCNC: 26.8 NG/ML (ref 30–100)
ALBUMIN SERPL-MCNC: 4.1 G/DL (ref 3.5–5.2)
ALBUMIN/GLOB SERPL: 1.2 G/DL
ALP SERPL-CCNC: 100 U/L (ref 39–117)
ALT SERPL W P-5'-P-CCNC: 13 U/L (ref 1–33)
ANION GAP SERPL CALCULATED.3IONS-SCNC: 10.4 MMOL/L (ref 5–15)
AST SERPL-CCNC: 20 U/L (ref 1–32)
BASOPHILS # BLD AUTO: 0.05 10*3/MM3 (ref 0–0.2)
BASOPHILS NFR BLD AUTO: 0.7 % (ref 0–1.5)
BILIRUB SERPL-MCNC: 0.3 MG/DL (ref 0–1.2)
BUN SERPL-MCNC: 14 MG/DL (ref 8–23)
BUN/CREAT SERPL: 17.7 (ref 7–25)
CALCIUM SPEC-SCNC: 9.7 MG/DL (ref 8.6–10.5)
CHLORIDE SERPL-SCNC: 104 MMOL/L (ref 98–107)
CHOLEST SERPL-MCNC: 239 MG/DL (ref 0–200)
CO2 SERPL-SCNC: 25.6 MMOL/L (ref 22–29)
CREAT SERPL-MCNC: 0.79 MG/DL (ref 0.57–1)
DEPRECATED RDW RBC AUTO: 41.8 FL (ref 37–54)
EGFRCR SERPLBLD CKD-EPI 2021: 80.6 ML/MIN/1.73
EOSINOPHIL # BLD AUTO: 0.32 10*3/MM3 (ref 0–0.4)
EOSINOPHIL NFR BLD AUTO: 4.8 % (ref 0.3–6.2)
ERYTHROCYTE [DISTWIDTH] IN BLOOD BY AUTOMATED COUNT: 11.9 % (ref 12.3–15.4)
FERRITIN SERPL-MCNC: 121 NG/ML (ref 13–150)
GLOBULIN UR ELPH-MCNC: 3.5 GM/DL
GLUCOSE SERPL-MCNC: 98 MG/DL (ref 65–99)
HBA1C MFR BLD: 5.3 % (ref 4.8–5.6)
HCT VFR BLD AUTO: 39.3 % (ref 34–46.6)
HDLC SERPL-MCNC: 78 MG/DL (ref 40–60)
HGB BLD-MCNC: 12.5 G/DL (ref 12–15.9)
IMM GRANULOCYTES # BLD AUTO: 0.01 10*3/MM3 (ref 0–0.05)
IMM GRANULOCYTES NFR BLD AUTO: 0.1 % (ref 0–0.5)
IRON 24H UR-MRATE: 61 MCG/DL (ref 37–145)
IRON SATN MFR SERPL: 20 % (ref 20–50)
LDLC SERPL CALC-MCNC: 147 MG/DL (ref 0–100)
LDLC/HDLC SERPL: 1.85 {RATIO}
LYMPHOCYTES # BLD AUTO: 1.6 10*3/MM3 (ref 0.7–3.1)
LYMPHOCYTES NFR BLD AUTO: 23.9 % (ref 19.6–45.3)
MCH RBC QN AUTO: 30.1 PG (ref 26.6–33)
MCHC RBC AUTO-ENTMCNC: 31.8 G/DL (ref 31.5–35.7)
MCV RBC AUTO: 94.7 FL (ref 79–97)
MONOCYTES # BLD AUTO: 0.44 10*3/MM3 (ref 0.1–0.9)
MONOCYTES NFR BLD AUTO: 6.6 % (ref 5–12)
NEUTROPHILS NFR BLD AUTO: 4.27 10*3/MM3 (ref 1.7–7)
NEUTROPHILS NFR BLD AUTO: 63.9 % (ref 42.7–76)
NRBC BLD AUTO-RTO: 0 /100 WBC (ref 0–0.2)
PLATELET # BLD AUTO: 339 10*3/MM3 (ref 140–450)
PMV BLD AUTO: 10.6 FL (ref 6–12)
POTASSIUM SERPL-SCNC: 4.2 MMOL/L (ref 3.5–5.2)
PROT SERPL-MCNC: 7.6 G/DL (ref 6–8.5)
RBC # BLD AUTO: 4.15 10*6/MM3 (ref 3.77–5.28)
SODIUM SERPL-SCNC: 140 MMOL/L (ref 136–145)
T4 FREE SERPL-MCNC: 1.23 NG/DL (ref 0.92–1.68)
TIBC SERPL-MCNC: 310 MCG/DL (ref 298–536)
TRANSFERRIN SERPL-MCNC: 208 MG/DL (ref 200–360)
TRIGL SERPL-MCNC: 84 MG/DL (ref 0–150)
TSH SERPL DL<=0.05 MIU/L-ACNC: 3.08 UIU/ML (ref 0.27–4.2)
VIT B12 BLD-MCNC: 313 PG/ML (ref 211–946)
VLDLC SERPL-MCNC: 14 MG/DL (ref 5–40)
WBC NRBC COR # BLD AUTO: 6.69 10*3/MM3 (ref 3.4–10.8)

## 2025-05-17 PROCEDURE — 82728 ASSAY OF FERRITIN: CPT | Performed by: NURSE PRACTITIONER

## 2025-05-17 PROCEDURE — 84466 ASSAY OF TRANSFERRIN: CPT | Performed by: NURSE PRACTITIONER

## 2025-05-17 PROCEDURE — 80061 LIPID PANEL: CPT | Performed by: NURSE PRACTITIONER

## 2025-05-17 PROCEDURE — 83036 HEMOGLOBIN GLYCOSYLATED A1C: CPT | Performed by: NURSE PRACTITIONER

## 2025-05-17 PROCEDURE — 83540 ASSAY OF IRON: CPT | Performed by: NURSE PRACTITIONER

## 2025-05-17 PROCEDURE — 82607 VITAMIN B-12: CPT | Performed by: NURSE PRACTITIONER

## 2025-05-17 PROCEDURE — 84439 ASSAY OF FREE THYROXINE: CPT | Performed by: NURSE PRACTITIONER

## 2025-05-17 PROCEDURE — 85025 COMPLETE CBC W/AUTO DIFF WBC: CPT | Performed by: NURSE PRACTITIONER

## 2025-05-17 PROCEDURE — 80053 COMPREHEN METABOLIC PANEL: CPT | Performed by: NURSE PRACTITIONER

## 2025-05-17 PROCEDURE — 84443 ASSAY THYROID STIM HORMONE: CPT | Performed by: NURSE PRACTITIONER

## 2025-05-17 PROCEDURE — 82306 VITAMIN D 25 HYDROXY: CPT | Performed by: NURSE PRACTITIONER

## 2025-05-17 PROCEDURE — 36415 COLL VENOUS BLD VENIPUNCTURE: CPT | Performed by: NURSE PRACTITIONER

## 2025-05-22 ENCOUNTER — TELEPHONE (OUTPATIENT)
Dept: INTERNAL MEDICINE | Facility: CLINIC | Age: 70
End: 2025-05-22
Payer: MEDICARE

## 2025-05-22 RX ORDER — ERGOCALCIFEROL 1.25 MG/1
50000 CAPSULE, LIQUID FILLED ORAL WEEKLY
Qty: 12 CAPSULE | Refills: 0 | Status: SHIPPED | OUTPATIENT
Start: 2025-05-22

## 2025-05-22 NOTE — TELEPHONE ENCOUNTER
Patient states that [facility] does not have the prescription for the vitamin D or the omeprazole.  Please advise. Phone number verified.

## 2025-07-14 ENCOUNTER — PREP FOR SURGERY (OUTPATIENT)
Dept: OTHER | Facility: HOSPITAL | Age: 70
End: 2025-07-14
Payer: MEDICARE

## 2025-07-14 DIAGNOSIS — H25.12 NUCLEAR SCLEROTIC CATARACT OF LEFT EYE: Primary | ICD-10-CM

## 2025-07-14 RX ORDER — SODIUM CHLORIDE 0.9 % (FLUSH) 0.9 %
10 SYRINGE (ML) INJECTION EVERY 12 HOURS SCHEDULED
Status: CANCELLED | OUTPATIENT
Start: 2025-07-14

## 2025-07-14 RX ORDER — SODIUM CHLORIDE 0.9 % (FLUSH) 0.9 %
1-10 SYRINGE (ML) INJECTION AS NEEDED
Status: CANCELLED | OUTPATIENT
Start: 2025-07-14

## 2025-07-14 RX ORDER — PREDNISOLONE ACETATE 10 MG/ML
1 SUSPENSION/ DROPS OPHTHALMIC SEE ADMIN INSTRUCTIONS
Status: CANCELLED | OUTPATIENT
Start: 2025-07-14

## 2025-07-14 RX ORDER — CYCLOPENT/TROPIC/PHEN/KETR/WAT 1%-1%-2.5%
1 DROPS (EA) OPHTHALMIC (EYE)
Status: CANCELLED | OUTPATIENT
Start: 2025-07-14 | End: 2025-07-14

## 2025-07-14 RX ORDER — TETRACAINE HYDROCHLORIDE 5 MG/ML
1 SOLUTION OPHTHALMIC SEE ADMIN INSTRUCTIONS
Status: CANCELLED | OUTPATIENT
Start: 2025-07-14

## 2025-07-16 NOTE — PRE-PROCEDURE INSTRUCTIONS
PAT phone history completed with patient for upcoming procedure on 7/21/25 with Dr. Painting.    PAT PASS reviewed with patient and they verbalize understanding of the following:     Do not eat or drink anything after midnight the night before procedure unless otherwise instructed by physician/surgeon's office, this includes no gum, candy, mints, tobacco products or e-cigarettes.  Do not shave the area to be operated on at least 48 hours prior to procedure.  Do not wear makeup, lotion, hair products, or nail polish.  Do not wear any jewelry and remove all piercings.  Do not wear any adhesive if you wear dentures.  Do not wear contacts; bring in glasses if needed.  Only take medications on the morning of procedure as instructed by PAT nurse per anesthesia guidelines or as instructed by physician's office.  If you are on any blood thinners reach out to the physician/surgeon's office for instructions on when/if they will need to be stopped prior to procedure.  Bring in picture ID and insurance card, advanced directive copies if applicable, CPAP/BIPAP/Inhalers if indicated morning of procedure, leave any other valuables at home.  Ensure you have arranged for someone to drive you home the day of your procedure and someone to care for you at home afterwards. It is recommended that you do not drive, drink alcohol, or make any major legal decisions for at least 24 hours after your procedure is complete.  ERAS instructions given unless otherwise instructed per surgeon's orders.    Instructions given on hospital entrance and registration location.

## 2025-07-17 ENCOUNTER — OFFICE VISIT (OUTPATIENT)
Dept: INTERNAL MEDICINE | Facility: CLINIC | Age: 70
End: 2025-07-17
Payer: MEDICARE

## 2025-07-17 VITALS
SYSTOLIC BLOOD PRESSURE: 144 MMHG | TEMPERATURE: 98.3 F | DIASTOLIC BLOOD PRESSURE: 74 MMHG | HEIGHT: 61 IN | HEART RATE: 72 BPM | BODY MASS INDEX: 28.32 KG/M2 | OXYGEN SATURATION: 98 % | WEIGHT: 150 LBS

## 2025-07-17 DIAGNOSIS — R03.0 ELEVATED BLOOD PRESSURE READING: ICD-10-CM

## 2025-07-17 DIAGNOSIS — Z87.19 HISTORY OF GASTRITIS: ICD-10-CM

## 2025-07-17 DIAGNOSIS — Z87.19 HISTORY OF ESOPHAGITIS: ICD-10-CM

## 2025-07-17 DIAGNOSIS — K21.9 GASTROESOPHAGEAL REFLUX DISEASE, UNSPECIFIED WHETHER ESOPHAGITIS PRESENT: Primary | ICD-10-CM

## 2025-07-17 PROCEDURE — 1160F RVW MEDS BY RX/DR IN RCRD: CPT | Performed by: NURSE PRACTITIONER

## 2025-07-17 PROCEDURE — 99213 OFFICE O/P EST LOW 20 MIN: CPT | Performed by: NURSE PRACTITIONER

## 2025-07-17 PROCEDURE — 1159F MED LIST DOCD IN RCRD: CPT | Performed by: NURSE PRACTITIONER

## 2025-07-17 PROCEDURE — 1126F AMNT PAIN NOTED NONE PRSNT: CPT | Performed by: NURSE PRACTITIONER

## 2025-07-17 NOTE — PROGRESS NOTES
Chief Complaint / Reason:      Chief Complaint   Patient presents with    Abdominal Pain     Along with epigastric has gotten worse in the last month almost every day        Subjective     History of Present Illness  The patient presents with complaints of epigastric pain and abdominal pain that has worsened over the past month, occurring almost daily. She underwent an upper GI series and colonoscopy in 2024, which revealed gastritis and esophagitis. She takes omeprazole daily and had a HIDA scan this year, which showed a normal hepatobiliary scan with a gallbladder ejection fraction of 56%.    She reports experiencing increased reflux despite taking omeprazole in the morning. Benefiber is used daily to manage constipation. She has lost some weight and feels generally unwell, experiencing intermittent soreness in her sides and heat sensations in her back that disrupt her sleep. The discomfort is described as pressure rather than severe pain. Despite attempts to secure an appointment with a gastroenterologist, she has been unable to do so due to their fully booked schedule. She continues to take her vitamins but does not notice any improvement. Various gas medications and frequent use of Tums have not provided relief. She underwent a gallbladder procedure performed by Dr. Sena, after which she has not felt well. Additionally, she was diagnosed with a hiatal hernia several years ago.    Social History:  Sleep: Reports disrupted sleep due to heat sensations in her back    PAST SURGICAL HISTORY:  Gallbladder procedure performed by Dr. Sena    History taken from: patient    PMH/FH/Social History were reviewed and updated appropriately in the electronic medical record.   Past Medical History:   Diagnosis Date    Abdominal pain, RUQ     Acute bronchitis     Atypical mole     COPD (chronic obstructive pulmonary disease)     Cough     Diarrhea     Disease of thyroid gland     Edema     Elevated blood pressure reading  without diagnosis of hypertension     Esophageal reflux     Fever     Full dentures     Hematochezia     Labyrinthitis, acute     Low back pain     Nocturnal hypoxia     Peptic ulcer     Shortness of breath     Sinusitis     Urinary frequency     UTI (urinary tract infection)     Wears glasses      Past Surgical History:   Procedure Laterality Date    APPENDECTOMY      BREAST SURGERY Right     Fibroid removed from breast     COLONOSCOPY N/A 2024    Procedure: COLONOSCOPY WITH POLYPECTOMY X2;  Surgeon: Catalino Cuevas MD;  Location: [facility] ENDOSCOPY;  Service: Gastroenterology;  Laterality: N/A;    ENDOSCOPY N/A 2020    Procedure: ESOPHAGOGASTRODUODENOSCOPY WITH BIOPSY;  Surgeon: Catalino Cuevas MD;  Location: [facility] ENDOSCOPY;  Service: Gastroenterology;  Laterality: N/A;    ENDOSCOPY N/A 2024    Procedure: ESOPHAGOGASTRODUODENOSCOPY with biopsy;  Surgeon: Catalino Cuevas MD;  Location: [facility] ENDOSCOPY;  Service: Gastroenterology;  Laterality: N/A;    HYSTERECTOMY      Partial - unsure of when    LAPAROSCOPIC CHOLECYSTECTOMY  2025     Social History     Socioeconomic History    Marital status:    Tobacco Use    Smoking status: Former     Current packs/day: 0.00     Average packs/day: 1.5 packs/day for 25.0 years (37.5 ttl pk-yrs)     Types: Cigarettes     Start date: 12/3/1988     Quit date: 12/3/2013     Years since quittin.6     Passive exposure: Past    Smokeless tobacco: Never   Vaping Use    Vaping status: Never Used   Substance and Sexual Activity    Alcohol use: No    Drug use: Never    Sexual activity: Defer     Family History   Problem Relation Age of Onset    Emphysema Mother     Heart attack Father     Prostate cancer Father     Breast cancer Neg Hx     Ovarian cancer Neg Hx        Review of Systems:   Review of Systems      All other systems were reviewed and are negative.  Exceptions are noted in the subjective or above.      Objective     Vital Signs  Vitals:     07/17/25 0921   BP: 144/74   Pulse: 72   Temp: 98.3 °F (36.8 °C)   SpO2: 98%       Body mass index is 28.36 kg/m².  BMI is >= 25 and <30. (Overweight) The following options were offered after discussion;: exercise counseling/recommendations and nutrition counseling/recommendations       Physical Exam  Vitals and nursing note reviewed.   Constitutional:       General: She is not in acute distress.     Appearance: She is well-developed.   Cardiovascular:      Rate and Rhythm: Normal rate and regular rhythm.      Pulses: Normal pulses.      Heart sounds: Normal heart sounds.   Pulmonary:      Effort: Pulmonary effort is normal.      Breath sounds: Normal breath sounds. No wheezing.   Chest:      Chest wall: No tenderness.   Abdominal:      General: Bowel sounds are normal.      Palpations: Abdomen is soft.      Tenderness: There is abdominal tenderness in the epigastric area.   Musculoskeletal:         General: Tenderness and deformity present.   Skin:     General: Skin is warm and dry.      Capillary Refill: Capillary refill takes less than 2 seconds.      Coloration: Skin is not pale.      Findings: No erythema or rash.   Neurological:      Mental Status: She is alert and oriented to person, place, and time.   Psychiatric:         Behavior: Behavior normal.         Thought Content: Thought content normal.         Judgment: Judgment normal.              Results Review:    I reviewed the patient's new clinical results.       Medication Review:     Current Outpatient Medications:     albuterol sulfate  (90 Base) MCG/ACT inhaler, Inhale 2 puffs Every 4 (Four) Hours As Needed for Wheezing., Disp: 18 g, Rfl: 0    fluticasone (FLONASE) 50 MCG/ACT nasal spray, 1 spray into the nostril(s) as directed by provider Daily. Administer 1 spray in each nostril for each dose. (Patient taking differently: Administer 1 spray into the nostril(s) as directed by provider As Needed. Administer 1 spray in each nostril for each  dose.), Disp: 48 g, Rfl: 2    ipratropium (ATROVENT) 0.02 % nebulizer solution, Take 2.5 mL by nebulization Every 4 (Four) Hours As Needed for Wheezing or Shortness of Air., Disp: 300 mL, Rfl: 2    levothyroxine (SYNTHROID, LEVOTHROID) 75 MCG tablet, Take 1 tablet by mouth Daily., Disp: 90 tablet, Rfl: 3    omeprazole (priLOSEC) 40 MG capsule, Take 1 capsule by mouth Daily., Disp: 90 capsule, Rfl: 3    ondansetron ODT (ZOFRAN-ODT) 4 MG disintegrating tablet, Place 1 tablet under the tongue Every 8 (Eight) Hours As Needed for Nausea or Vomiting., Disp: 15 tablet, Rfl: 0    vitamin D (ERGOCALCIFEROL) 1.25 MG (58132 UT) capsule capsule, Take 1 capsule by mouth 1 (One) Time Per Week., Disp: 12 capsule, Rfl: 0    Diagnoses and all orders for this visit:    Gastroesophageal reflux disease, unspecified whether esophagitis present    History of gastritis    History of esophagitis    Elevated blood pressure reading      Assessment & Plan  1. Epigastric pain.  - Symptoms include worsening epigastric and abdominal pain occurring almost daily, consistent with gastritis and esophagitis.  - Physical exam findings show tenderness in the epigastric region; previous EGD indicated acute gastritis and esophagitis with no bleeding.  - Discussed changing the timing of omeprazole to nighttime to better manage symptoms; reviewed the need to avoid constipation and maintain a low-fat diet.  - Prescribed omeprazole to be taken at night; recommended over-the-counter Mylanta or milk of magnesia during the day; referral to GI specialist if symptoms persist.    Return if symptoms worsen or fail to improve.    MITCHEL Ortiz  07/17/2025    Patient or patient representative verbalized consent for the use of Ambient Listening during the visit with  MITCHEL Ortiz for chart documentation.

## 2025-07-20 ENCOUNTER — ANESTHESIA EVENT (OUTPATIENT)
Dept: PERIOP | Facility: HOSPITAL | Age: 70
End: 2025-07-20
Payer: MEDICARE

## 2025-07-21 ENCOUNTER — ANESTHESIA (OUTPATIENT)
Dept: PERIOP | Facility: HOSPITAL | Age: 70
End: 2025-07-21
Payer: MEDICARE

## 2025-07-21 ENCOUNTER — HOSPITAL ENCOUNTER (OUTPATIENT)
Facility: HOSPITAL | Age: 70
Setting detail: HOSPITAL OUTPATIENT SURGERY
Discharge: HOME | End: 2025-07-21
Attending: OPHTHALMOLOGY | Admitting: OPHTHALMOLOGY
Payer: MEDICARE

## 2025-07-21 VITALS
OXYGEN SATURATION: 94 % | RESPIRATION RATE: 16 BRPM | SYSTOLIC BLOOD PRESSURE: 122 MMHG | DIASTOLIC BLOOD PRESSURE: 61 MMHG | HEART RATE: 74 BPM | TEMPERATURE: 97.4 F | BODY MASS INDEX: 28.51 KG/M2 | WEIGHT: 151 LBS | HEIGHT: 61 IN

## 2025-07-21 DIAGNOSIS — H25.12 NUCLEAR SCLEROTIC CATARACT OF LEFT EYE: ICD-10-CM

## 2025-07-21 PROCEDURE — V2632 POST CHMBR INTRAOCULAR LENS: HCPCS | Performed by: OPHTHALMOLOGY

## 2025-07-21 PROCEDURE — 25010000002 LIDOCAINE HCL (PF) 4 % SOLUTION: Performed by: OPHTHALMOLOGY

## 2025-07-21 PROCEDURE — 25010000002 MIDAZOLAM PER 1MG: Performed by: NURSE ANESTHETIST, CERTIFIED REGISTERED

## 2025-07-21 PROCEDURE — 25010000002 FENTANYL CITRATE (PF) 50 MCG/ML SOLUTION PREFILLED SYRINGE: Performed by: NURSE ANESTHETIST, CERTIFIED REGISTERED

## 2025-07-21 DEVICE — LENS MONOFOCAL IQ CC60WF220: Type: IMPLANTABLE DEVICE | Site: POSTERIOR CHAMBER | Status: FUNCTIONAL

## 2025-07-21 RX ORDER — PREDNISOLONE ACETATE 10 MG/ML
1 SUSPENSION/ DROPS OPHTHALMIC SEE ADMIN INSTRUCTIONS
Status: DISCONTINUED | OUTPATIENT
Start: 2025-07-21 | End: 2025-07-21 | Stop reason: HOSPADM

## 2025-07-21 RX ORDER — BALANCED SALT SOLUTION 6.4; .75; .48; .3; 3.9; 1.7 MG/ML; MG/ML; MG/ML; MG/ML; MG/ML; MG/ML
SOLUTION OPHTHALMIC AS NEEDED
Status: DISCONTINUED | OUTPATIENT
Start: 2025-07-21 | End: 2025-07-21 | Stop reason: HOSPADM

## 2025-07-21 RX ORDER — IPRATROPIUM BROMIDE AND ALBUTEROL SULFATE 2.5; .5 MG/3ML; MG/3ML
3 SOLUTION RESPIRATORY (INHALATION) ONCE AS NEEDED
Status: CANCELLED | OUTPATIENT
Start: 2025-07-21

## 2025-07-21 RX ORDER — TETRACAINE HYDROCHLORIDE 5 MG/ML
1 SOLUTION OPHTHALMIC SEE ADMIN INSTRUCTIONS
Status: COMPLETED | OUTPATIENT
Start: 2025-07-21 | End: 2025-07-21

## 2025-07-21 RX ORDER — LIDOCAINE HYDROCHLORIDE 40 MG/ML
INJECTION, SOLUTION RETROBULBAR AS NEEDED
Status: DISCONTINUED | OUTPATIENT
Start: 2025-07-21 | End: 2025-07-21 | Stop reason: HOSPADM

## 2025-07-21 RX ORDER — SODIUM CHLORIDE 0.9 % (FLUSH) 0.9 %
10 SYRINGE (ML) INJECTION EVERY 12 HOURS SCHEDULED
Status: DISCONTINUED | OUTPATIENT
Start: 2025-07-21 | End: 2025-07-21 | Stop reason: HOSPADM

## 2025-07-21 RX ORDER — MOXIFLOXACIN IN NACL,ISO-OS/PF 1.6 MG/ML
SYRINGE (ML) INTRAOCULAR AS NEEDED
Status: DISCONTINUED | OUTPATIENT
Start: 2025-07-21 | End: 2025-07-21 | Stop reason: HOSPADM

## 2025-07-21 RX ORDER — MIDAZOLAM HYDROCHLORIDE 2 MG/2ML
INJECTION, SOLUTION INTRAMUSCULAR; INTRAVENOUS AS NEEDED
Status: DISCONTINUED | OUTPATIENT
Start: 2025-07-21 | End: 2025-07-21 | Stop reason: SURG

## 2025-07-21 RX ORDER — CYCLOPENT/TROPIC/PHEN/KETR/WAT 1%-1%-2.5%
1 DROPS (EA) OPHTHALMIC (EYE)
Status: COMPLETED | OUTPATIENT
Start: 2025-07-21 | End: 2025-07-21

## 2025-07-21 RX ORDER — PREDNISOLONE ACETATE 10 MG/ML
SUSPENSION/ DROPS OPHTHALMIC AS NEEDED
Status: DISCONTINUED | OUTPATIENT
Start: 2025-07-21 | End: 2025-07-21 | Stop reason: HOSPADM

## 2025-07-21 RX ORDER — POVIDONE-IODINE 5 %
SOLUTION, NON-ORAL OPHTHALMIC (EYE) AS NEEDED
Status: DISCONTINUED | OUTPATIENT
Start: 2025-07-21 | End: 2025-07-21 | Stop reason: HOSPADM

## 2025-07-21 RX ORDER — ACETAZOLAMIDE 500 MG/1
500 CAPSULE, EXTENDED RELEASE ORAL ONCE
Status: COMPLETED | OUTPATIENT
Start: 2025-07-21 | End: 2025-07-21

## 2025-07-21 RX ORDER — NEOMYCIN SULFATE, POLYMYXIN B SULFATE, AND DEXAMETHASONE 3.5; 10000; 1 MG/G; [USP'U]/G; MG/G
OINTMENT OPHTHALMIC AS NEEDED
Status: DISCONTINUED | OUTPATIENT
Start: 2025-07-21 | End: 2025-07-21 | Stop reason: HOSPADM

## 2025-07-21 RX ORDER — FENTANYL CITRATE 50 UG/ML
INJECTION, SOLUTION INTRAMUSCULAR; INTRAVENOUS AS NEEDED
Status: DISCONTINUED | OUTPATIENT
Start: 2025-07-21 | End: 2025-07-21 | Stop reason: SURG

## 2025-07-21 RX ORDER — TETRACAINE HYDROCHLORIDE 5 MG/ML
SOLUTION OPHTHALMIC AS NEEDED
Status: DISCONTINUED | OUTPATIENT
Start: 2025-07-21 | End: 2025-07-21 | Stop reason: HOSPADM

## 2025-07-21 RX ORDER — SODIUM CHLORIDE 0.9 % (FLUSH) 0.9 %
1-10 SYRINGE (ML) INJECTION AS NEEDED
Status: DISCONTINUED | OUTPATIENT
Start: 2025-07-21 | End: 2025-07-21 | Stop reason: HOSPADM

## 2025-07-21 RX ADMIN — Medication 1 DROP: at 09:40

## 2025-07-21 RX ADMIN — Medication 1 DROP: at 09:30

## 2025-07-21 RX ADMIN — ACETAZOLAMIDE 500 MG: 500 CAPSULE ORAL at 11:53

## 2025-07-21 RX ADMIN — Medication 1 DROP: at 09:35

## 2025-07-21 RX ADMIN — FENTANYL CITRATE 25 MCG: 50 INJECTION, SOLUTION INTRAMUSCULAR; INTRAVENOUS at 11:19

## 2025-07-21 RX ADMIN — TETRACAINE HYDROCHLORIDE 1 DROP: 5 SOLUTION OPHTHALMIC at 09:29

## 2025-07-21 RX ADMIN — TETRACAINE HYDROCHLORIDE 1 DROP: 5 SOLUTION OPHTHALMIC at 09:28

## 2025-07-21 RX ADMIN — FENTANYL CITRATE 25 MCG: 50 INJECTION, SOLUTION INTRAMUSCULAR; INTRAVENOUS at 11:23

## 2025-07-21 RX ADMIN — MIDAZOLAM HYDROCHLORIDE 2 MG: 1 INJECTION, SOLUTION INTRAMUSCULAR; INTRAVENOUS at 11:19

## 2025-07-21 NOTE — OP NOTE
OPERATIVE NOTE    Date of Procedure: 7/21/2025  Patient Name: Carola Resendiz  Patient MRN: [id number]  YOB: 1955     Preoperative Diagnosis: Left nuclear sclerotic cataract.     Postoperative Diagnosis: Left nuclear sclerotic cataract.     Procedure Performed: Phacoemulsification with implantation of a foldable posterior chamber intraocular lens, Left eye.     Surgeon: Paxton Painting MD    Anesthesia:  Monitored Anesthesia Care (MAC)      Brief History and Indication: The patient presents with a history of past progressive loss of vision.  Patient was diagnosed with cataract and requests removal for increased ability to read and see.     Operation Description: The patient was taken to the OR and prepped and draped in the usual sterile ophthalmic fashion. A lid speculum was placed in the eye.  A #75 blade was then used to make a stab incision two o’clock hours from the intended temporal clear cornea groove. The anterior chamber was then inflated with a Viscoelastic. A metal microkeratome blade was then used to enter the anterior chamber at the temporal clear cornea site. A three level tunnel incision was made. A curvilinear capsulorrhexis was then performed with a bent cystotome needle and capsulorrhexis forceps.  BSS on a 30 gauge bent cannula was used to hydro-dissect, and hydro-delineate the lens. Good fluid waves and hydro-delineation were noted. Phacoemulsification was then used to remove nuclear material without complications. The residual cortical and lenticular material was then removed with irrigation and aspiration. Viscoelastics were then used to inflate the bag in a soft shell technique. A PCIOL was injected into the bag. Post-implantation, there were no rents or tears in the bag and the lens was noted to be stable and centered. The residual Viscoelastic was then removed with irrigation and aspiration.  The wound was checked and found to be without leaks. Therefore a Polydex ointment and  one drop of Durezol eye drop was placed in the eye.     Implant Information:   Implant Name Type Inv. Item Serial No.  Lot No. LRB No. Used Action   LENS MONOFOCAL IQ [id number] - [id number] - [id number] Implant LENS MONOFOCAL IQ [id number] NICOLAS NA Left 1 Implanted       Complications: None    Estimated Blood Loss:  Less than 1 cc.       [x]   Changed to complex procedure due to: []  hypermature, white cataract. Blue dye was used. [x]   small iris. A Malyugin Ring was used.    Discharge and Condition  The patient was transported to same day surgery in excellent condition and scheduled for follow-up tomorrow morning. The patient was given instructions on use of eye drops for the operative eye and was specifically instructed to call Dr. Painting at his office or home for any nausea, vomiting, headache, decreased visual acuity, or pain, or if the patient had any general concerns.    Paxton Painting MD  7/21/2025

## 2025-07-21 NOTE — H&P
[facility]         History and Physical    Patient Name: Carola Resendiz  MRN: [id number]  : 1955  Gender: female     HPI: Patient complaint of PPLOV Left eye diagnosed with cataract. Patient requests PHACO PCIOL for Increase of VA/ADL.    History:    Past Medical History:   Diagnosis Date    Abdominal pain, RUQ     Acute bronchitis     Atypical mole     COPD (chronic obstructive pulmonary disease)     Cough     Diarrhea     Disease of thyroid gland     Edema     Elevated blood pressure reading without diagnosis of hypertension     Esophageal reflux     Fever     Full dentures     Hematochezia     Labyrinthitis, acute     Low back pain     Nocturnal hypoxia     Peptic ulcer     Shortness of breath     Sinusitis     Urinary frequency     UTI (urinary tract infection)     Wears glasses        Past Surgical History:   Procedure Laterality Date    APPENDECTOMY      BREAST SURGERY Right     Fibroid removed from breast     COLONOSCOPY N/A 2024    Procedure: COLONOSCOPY WITH POLYPECTOMY X2;  Surgeon: Catalino Cuevas MD;  Location: [facility] ENDOSCOPY;  Service: Gastroenterology;  Laterality: N/A;    ENDOSCOPY N/A 2020    Procedure: ESOPHAGOGASTRODUODENOSCOPY WITH BIOPSY;  Surgeon: Catalino Cuevas MD;  Location: [facility] ENDOSCOPY;  Service: Gastroenterology;  Laterality: N/A;    ENDOSCOPY N/A 2024    Procedure: ESOPHAGOGASTRODUODENOSCOPY with biopsy;  Surgeon: Catalino Cuevas MD;  Location: [facility] ENDOSCOPY;  Service: Gastroenterology;  Laterality: N/A;    HYSTERECTOMY      Partial - unsure of when    LAPAROSCOPIC CHOLECYSTECTOMY  2025       Social History     Socioeconomic History    Marital status:    Tobacco Use    Smoking status: Former     Current packs/day: 0.00     Average packs/day: 1.5 packs/day for 25.0 years (37.5 ttl pk-yrs)     Types: Cigarettes     Start date: 12/3/1988     Quit date: 12/3/2013     Years since quittin.6     Passive  exposure: Past    Smokeless tobacco: Never   Vaping Use    Vaping status: Never Used   Substance and Sexual Activity    Alcohol use: No    Drug use: Never    Sexual activity: Defer       Family History   Problem Relation Age of Onset    Emphysema Mother     Heart attack Father     Prostate cancer Father     Breast cancer Neg Hx     Ovarian cancer Neg Hx        Prior to Admission Medications:  Medications Prior to Admission   Medication Sig Dispense Refill Last Dose/Taking    albuterol sulfate  (90 Base) MCG/ACT inhaler Inhale 2 puffs Every 4 (Four) Hours As Needed for Wheezing. 18 g 0 7/20/2025    fluticasone (FLONASE) 50 MCG/ACT nasal spray 1 spray into the nostril(s) as directed by provider Daily. Administer 1 spray in each nostril for each dose. (Patient taking differently: Administer 1 spray into the nostril(s) as directed by provider As Needed. Administer 1 spray in each nostril for each dose.) 48 g 2 7/20/2025    ipratropium (ATROVENT) 0.02 % nebulizer solution Take 2.5 mL by nebulization Every 4 (Four) Hours As Needed for Wheezing or Shortness of Air. 300 mL 2 7/20/2025    levothyroxine (SYNTHROID, LEVOTHROID) 75 MCG tablet Take 1 tablet by mouth Daily. 90 tablet 3 7/21/2025 Morning    omeprazole (priLOSEC) 40 MG capsule Take 1 capsule by mouth Daily. 90 capsule 3 7/20/2025    ondansetron ODT (ZOFRAN-ODT) 4 MG disintegrating tablet Place 1 tablet under the tongue Every 8 (Eight) Hours As Needed for Nausea or Vomiting. 15 tablet 0 7/20/2025    vitamin D (ERGOCALCIFEROL) 1.25 MG (80877 UT) capsule capsule Take 1 capsule by mouth 1 (One) Time Per Week. 12 capsule 0 7/20/2025       Allergies:  No Known Allergies     Vitals: Temp:  [98.3 °F (36.8 °C)] 98.3 °F (36.8 °C)  Heart Rate:  [72] 72  Resp:  [18] 18  BP: (175)/(73) 175/73    Review of Systems:   Within Normal Limits Abnormal   HEENT [x]    []     Cardiovascular [x]   []     Gastrointestinal [x]   []     Genitourinary [x]   []     Neurologic [x]   []      Pulmonary [x]   []       Physical Exam:   Within Normal Limits Abnormal   HEENT [x]    []     Heart [x]   []     Lungs [x]   []     Abdomen [x]   []     Extremities [x]   []       Impression: Left nuclear sclerotic cataract.     Plan: CATARACT PHACO EXTRACTION WITH INTRAOCULAR LENS IMPLANT LEFT (Left)     Paxton Painting MD  7/21/2025

## 2025-07-21 NOTE — ANESTHESIA POSTPROCEDURE EVALUATION
Patient: Carola Resendiz    Procedure Summary       Date: 07/21/25 Room / Location: [facility] OR 3 /  [facility]    Anesthesia Start: 1115 Anesthesia Stop: 1132    Procedure: CATARACT PHACO EXTRACTION WITH INTRAOCULAR LENS IMPLANT LEFT COMPLICATED WITH MALYUGIN RING (Left: Eye) Diagnosis:       Nuclear sclerotic cataract of left eye      (Nuclear sclerotic cataract of left eye [H25.12])    Surgeons: Paxton Painting MD Provider: Vernell Balderas CRNA    Anesthesia Type: MAC ASA Status: 3            Anesthesia Type: MAC    Vitals  Vitals Value Taken Time   /91 07/21/25 11:35   Temp 97.4 °F (36.3 °C) 07/21/25 11:35   Pulse 74 07/21/25 11:35   Resp 16 07/21/25 11:35   SpO2 96 % 07/21/25 11:35           Post Anesthesia Care and Evaluation    Patient location during evaluation: bedside  Patient participation: complete - patient participated  Level of consciousness: awake and alert  Pain score: 0  Pain management: satisfactory to patient    Airway patency: patent  Anesthetic complications: No anesthetic complications  PONV Status: none  Cardiovascular status: acceptable and stable  Respiratory status: acceptable, room air, unassisted and spontaneous ventilation  Hydration status: acceptable    Comments: Vitals signs as noted in nursing documentation as per protocol.

## 2025-07-21 NOTE — ANESTHESIA PREPROCEDURE EVALUATION
Anesthesia Evaluation     Patient summary reviewed and Nursing notes reviewed   NPO Solid Status: > 8 hours  NPO Liquid Status: > 4 hours           Airway   Mallampati: II  TM distance: >3 FB  Neck ROM: full  No difficulty expected  Dental    (+) upper dentures and lower dentures    Pulmonary - normal exam   (+) a smoker (37.5 pack years quit 2013) Former, Abstained day of surgery, cigarettes, COPD moderate,shortness of breath    ROS comment: Acute bronchitis    Atypical mole  Cough      Cardiovascular - normal exam    Rhythm: regular  Rate: normal    (+) hyperlipidemia      Neuro/Psych  (+) dizziness/light headedness  GI/Hepatic/Renal/Endo    (+) GERD well controlled, PUD, thyroid problem hypothyroidism    ROS Comment:      Diarrhea       Esophageal reflux           Musculoskeletal     (+) back pain, gait problem  Abdominal  - normal exam   Substance History - negative use     OB/GYN negative ob/gyn ROS         Other - negative ROS                         Anesthesia Plan    ASA 3     MAC   total IV anesthesia  (Risks and benefits discussed including risk of aspiration, recall and dental damage. All patient questions answered.    Will continue with plan of care.)  intravenous induction     Anesthetic plan, risks, benefits, and alternatives have been provided, discussed and informed consent has been obtained with: patient.  Pre-procedure education provided      CODE STATUS:

## 2025-07-21 NOTE — DISCHARGE INSTRUCTIONS
[facility], [facility]  [address] [place] [address]  (P): [phone]           (F): [phone]    Carola Resendiz  PATIENT NAME:__________________________________    Left Eye    POST OPERATIVE INSTRUCTIONS    You have received anesthesia today. DO NOT drive, drink alcohol, sign legal documents.   After surgery, your eye will not hurt. It may feel scratchy, sticky or uncomfortable. Your eye will be sensitive to light.  Most people see better 1-3 days after the procedure, but it could take 3 weeks to get the full benefits and reach your visual potential. If your vision is blurry for a few days it is normal, and means you may have swelling outside or inside the eye. For some patients, a bubble is placed and there will be blurriness.   You should receive a post-op kit with tape and an eye shield. Wear the shield for the first 3 nights after surgery to keep you from rubbing the eye.  Most people are able to return to their normal routine 1-3 days after surgery, however, due to the brain adjusting to your new vision you may have trouble judging distances and want to be careful when driving and going up and downstairs.   You can shower and wash your hair the day after surgery. Keep water, shampoo, hair spray and shaving lotion out of the eye, especially for the first week.  During the first week, you should AVOID:   Rubbing or putting pressure on your eye.  Eye make-up, face cream or lotion, hair coloring or perms  Strenuous activities, such as running or lifting weights, as to avoid sweat from getting in the eye. Avoid swimming, hot tubs, fumes or ilan conditions.   Keep your head above your heart (no hanging the head down for periods of time).  Some discomfort and blurred vision after surgery are normal, but if you have any unusual pain, swelling, bleeding or sudden decrease in vision, contact our office immediately. Emergency assistance is available at any time by calling:    Dr. Paxton Cruz  [facility]    [phone]    If unable to reach call [facility] @ [phone]            POST OPERATIVE DROP INSTRUCTIONS  You have been prescribed eye drops to use after surgery, please follow these instructions:  PLACE A INGE IN THE DAY COLUMN EACH TIME YOU USE TO KEEP ON SCHEDULE. WAIT 5 MINUTES IN BETWEEN DROPS    Prednisolone (PINK TOP) SHAKE WELL BEFORE USE   GIVEN TO YOU BY THE HOSPITAL    WEEK 1-USE 4  (FOUR) TIMES A DAY DAY 1   DAY 2 DAY 3 DAY 4 DAY 5 DAY 6 DAY 7     WEEK 2-USE 3 (THREE)  TIMES A DAY DAY 1 DAY 2 DAY 3 DAY 4 DAY 5 DAY 6 DAY 7     WEEK 3-USE 2 (TWO) TIMES A DAY DAY 1 DAY 2 DAY 3 DAY 4 DAY 5 DAY 6 DAY 7     WEEK 4-USE 1 (ONE)TIME A DAY DAY 1 DAY 2 DAY 3 DAY 4 DAY 5 DAY 6 DAY 7         Ketorolac (GRAY TOP) PRESCRIBED TO YOUR PHARMACY    WEEK 1-USE 4  (FOUR) TIMES A DAY DAY 1   DAY 2 DAY 3 DAY 4 DAY 5 DAY 6 DAY 7     WEEK 2-USE 3 (THREE)  TIMES A DAY DAY 1 DAY 2 DAY 3 DAY 4 DAY 5 DAY 6 DAY 7     WEEK 3-USE 2 (TWO) TIMES A DAY DAY 1 DAY 2 DAY 3 DAY 4 DAY 5 DAY 6 DAY 7     WEEK 4-USE 1 (ONE)TIME A DAY DAY 1 DAY 2 DAY 3 DAY 4 DAY 5 DAY 6 DAY 7       Moxifloxacin (TAN TOP) PRESCRIBED TO YOUR PHARMACY    WEEK 1-USE 4  (FOUR) TIMES A DAY DAY 1   DAY 2 DAY 3 DAY 4 DAY 5 DAY 6 DAY 7         Rest today  No pushing,pulling,tugging,heavy lifting, or strenuous activity   No major decision making,driving,or drinking alcoholic beverages for 24 hours due to the sedation you received  Always use good hand hygiene/washing technique  No driving on pain medication.To assist you in voiding:  Drink plenty of fluids  Listen to running water while attempting to void.    If you are unable to urinate and you have an uncomfortable urge to void or it has been   6 hours since you were discharged, return to the Emergency RoomCOMMON SIDE EFFECTS OF SEDATION  Nausea and Vomiting- often occurs within the first few hours after surgery  Dry Mouth- due to reduced  production of saliva during surgery  Drowsiness- may last a few hours after surgery or even into the next day  Chills or Shivering- due to body temperature changes during surgery  Temporary Confusion or Disorientation- most common in older adults or with higher sedation doses  Dizziness- often occurs when moving too quickly after waking

## 2025-07-24 ENCOUNTER — PREP FOR SURGERY (OUTPATIENT)
Dept: OTHER | Facility: HOSPITAL | Age: 70
End: 2025-07-24
Payer: MEDICARE

## 2025-07-24 DIAGNOSIS — H25.11 NUCLEAR SCLEROTIC CATARACT OF RIGHT EYE: Primary | ICD-10-CM

## 2025-07-24 RX ORDER — SODIUM CHLORIDE 0.9 % (FLUSH) 0.9 %
1-10 SYRINGE (ML) INJECTION AS NEEDED
Status: CANCELLED | OUTPATIENT
Start: 2025-07-24

## 2025-07-24 RX ORDER — PREDNISOLONE ACETATE 10 MG/ML
1 SUSPENSION/ DROPS OPHTHALMIC SEE ADMIN INSTRUCTIONS
Status: CANCELLED | OUTPATIENT
Start: 2025-07-24

## 2025-07-24 RX ORDER — CYCLOPENT/TROPIC/PHEN/KETR/WAT 1%-1%-2.5%
1 DROPS (EA) OPHTHALMIC (EYE)
Status: CANCELLED | OUTPATIENT
Start: 2025-07-24 | End: 2025-07-24

## 2025-07-24 RX ORDER — TETRACAINE HYDROCHLORIDE 5 MG/ML
1 SOLUTION OPHTHALMIC SEE ADMIN INSTRUCTIONS
Status: CANCELLED | OUTPATIENT
Start: 2025-07-24

## 2025-07-24 RX ORDER — SODIUM CHLORIDE 0.9 % (FLUSH) 0.9 %
10 SYRINGE (ML) INJECTION EVERY 12 HOURS SCHEDULED
Status: CANCELLED | OUTPATIENT
Start: 2025-07-24

## 2025-07-28 ENCOUNTER — TELEPHONE (OUTPATIENT)
Dept: INTERNAL MEDICINE | Facility: CLINIC | Age: 70
End: 2025-07-28
Payer: MEDICARE

## 2025-07-28 DIAGNOSIS — Z87.19 HISTORY OF ESOPHAGITIS: ICD-10-CM

## 2025-07-28 DIAGNOSIS — K21.9 GASTROESOPHAGEAL REFLUX DISEASE, UNSPECIFIED WHETHER ESOPHAGITIS PRESENT: Primary | ICD-10-CM

## 2025-07-28 DIAGNOSIS — Z87.19 HISTORY OF GASTRITIS: ICD-10-CM

## 2025-07-28 NOTE — TELEPHONE ENCOUNTER
Caller: Carola Resendiz    Relationship: Self    Best call back number: [phone]     What specialty or service is being requested: GASTROENTEROLOGY     What is the provider, practice or medical service name: TO BE DETERMINED BY THE PRIMARY CARE PROVIDER     Any additional details: PATIENT WAS ADVISED THAT IF HER STOMACH WAS NOT ANY BETTER AFTER MEDICATION TO CALL THE PROVIDER BACK SO SHE COULD GET A REFERRAL TO GASTROENTEROLOGY.

## 2025-08-04 ENCOUNTER — HOSPITAL ENCOUNTER (OUTPATIENT)
Facility: HOSPITAL | Age: 70
Setting detail: HOSPITAL OUTPATIENT SURGERY
Discharge: HOME | End: 2025-08-04
Attending: OPHTHALMOLOGY | Admitting: OPHTHALMOLOGY
Payer: MEDICARE

## 2025-08-04 ENCOUNTER — ANESTHESIA (OUTPATIENT)
Dept: PERIOP | Facility: HOSPITAL | Age: 70
End: 2025-08-04
Payer: MEDICARE

## 2025-08-04 ENCOUNTER — ANESTHESIA EVENT (OUTPATIENT)
Dept: PERIOP | Facility: HOSPITAL | Age: 70
End: 2025-08-04
Payer: MEDICARE

## 2025-08-04 VITALS
OXYGEN SATURATION: 93 % | TEMPERATURE: 97.2 F | HEART RATE: 73 BPM | SYSTOLIC BLOOD PRESSURE: 132 MMHG | RESPIRATION RATE: 16 BRPM | DIASTOLIC BLOOD PRESSURE: 62 MMHG

## 2025-08-04 DIAGNOSIS — H25.11 NUCLEAR SCLEROTIC CATARACT OF RIGHT EYE: ICD-10-CM

## 2025-08-04 PROCEDURE — 25010000002 LIDOCAINE HCL (PF) 4 % SOLUTION: Performed by: OPHTHALMOLOGY

## 2025-08-04 PROCEDURE — 25010000002 FENTANYL CITRATE (PF) 50 MCG/ML SOLUTION PREFILLED SYRINGE: Performed by: NURSE ANESTHETIST, CERTIFIED REGISTERED

## 2025-08-04 PROCEDURE — V2632 POST CHMBR INTRAOCULAR LENS: HCPCS | Performed by: OPHTHALMOLOGY

## 2025-08-04 PROCEDURE — 25010000002 MIDAZOLAM PER 1MG: Performed by: NURSE ANESTHETIST, CERTIFIED REGISTERED

## 2025-08-04 DEVICE — LENS MONOFOCAL IQ CC60WF225: Type: IMPLANTABLE DEVICE | Site: POSTERIOR CHAMBER | Status: FUNCTIONAL

## 2025-08-04 RX ORDER — SODIUM CHLORIDE 0.9 % (FLUSH) 0.9 %
1-10 SYRINGE (ML) INJECTION AS NEEDED
Status: DISCONTINUED | OUTPATIENT
Start: 2025-08-04 | End: 2025-08-04 | Stop reason: HOSPADM

## 2025-08-04 RX ORDER — PREDNISOLONE ACETATE 10 MG/ML
SUSPENSION/ DROPS OPHTHALMIC AS NEEDED
Status: DISCONTINUED | OUTPATIENT
Start: 2025-08-04 | End: 2025-08-04 | Stop reason: HOSPADM

## 2025-08-04 RX ORDER — NEOMYCIN SULFATE, POLYMYXIN B SULFATE, AND DEXAMETHASONE 3.5; 10000; 1 MG/G; [USP'U]/G; MG/G
OINTMENT OPHTHALMIC AS NEEDED
Status: DISCONTINUED | OUTPATIENT
Start: 2025-08-04 | End: 2025-08-04 | Stop reason: HOSPADM

## 2025-08-04 RX ORDER — TETRACAINE HYDROCHLORIDE 5 MG/ML
1 SOLUTION OPHTHALMIC SEE ADMIN INSTRUCTIONS
Status: COMPLETED | OUTPATIENT
Start: 2025-08-04 | End: 2025-08-04

## 2025-08-04 RX ORDER — MIDAZOLAM HYDROCHLORIDE 2 MG/2ML
INJECTION, SOLUTION INTRAMUSCULAR; INTRAVENOUS AS NEEDED
Status: DISCONTINUED | OUTPATIENT
Start: 2025-08-04 | End: 2025-08-04 | Stop reason: SURG

## 2025-08-04 RX ORDER — MOXIFLOXACIN IN NACL,ISO-OS/PF 1.6 MG/ML
SYRINGE (ML) INTRAOCULAR AS NEEDED
Status: DISCONTINUED | OUTPATIENT
Start: 2025-08-04 | End: 2025-08-04 | Stop reason: HOSPADM

## 2025-08-04 RX ORDER — BALANCED SALT SOLUTION 6.4; .75; .48; .3; 3.9; 1.7 MG/ML; MG/ML; MG/ML; MG/ML; MG/ML; MG/ML
SOLUTION OPHTHALMIC AS NEEDED
Status: DISCONTINUED | OUTPATIENT
Start: 2025-08-04 | End: 2025-08-04 | Stop reason: HOSPADM

## 2025-08-04 RX ORDER — IPRATROPIUM BROMIDE AND ALBUTEROL SULFATE 2.5; .5 MG/3ML; MG/3ML
3 SOLUTION RESPIRATORY (INHALATION) ONCE AS NEEDED
Status: DISCONTINUED | OUTPATIENT
Start: 2025-08-04 | End: 2025-08-04 | Stop reason: HOSPADM

## 2025-08-04 RX ORDER — POVIDONE-IODINE 5 %
SOLUTION, NON-ORAL OPHTHALMIC (EYE) AS NEEDED
Status: DISCONTINUED | OUTPATIENT
Start: 2025-08-04 | End: 2025-08-04 | Stop reason: HOSPADM

## 2025-08-04 RX ORDER — PREDNISOLONE ACETATE 10 MG/ML
1 SUSPENSION/ DROPS OPHTHALMIC SEE ADMIN INSTRUCTIONS
Status: DISCONTINUED | OUTPATIENT
Start: 2025-08-04 | End: 2025-08-04 | Stop reason: HOSPADM

## 2025-08-04 RX ORDER — FENTANYL CITRATE 50 UG/ML
INJECTION, SOLUTION INTRAMUSCULAR; INTRAVENOUS AS NEEDED
Status: DISCONTINUED | OUTPATIENT
Start: 2025-08-04 | End: 2025-08-04 | Stop reason: SURG

## 2025-08-04 RX ORDER — ACETAZOLAMIDE 500 MG/1
500 CAPSULE, EXTENDED RELEASE ORAL ONCE
Status: COMPLETED | OUTPATIENT
Start: 2025-08-04 | End: 2025-08-04

## 2025-08-04 RX ORDER — LIDOCAINE HYDROCHLORIDE 40 MG/ML
INJECTION, SOLUTION RETROBULBAR AS NEEDED
Status: DISCONTINUED | OUTPATIENT
Start: 2025-08-04 | End: 2025-08-04 | Stop reason: HOSPADM

## 2025-08-04 RX ORDER — SODIUM CHLORIDE 0.9 % (FLUSH) 0.9 %
10 SYRINGE (ML) INJECTION EVERY 12 HOURS SCHEDULED
Status: DISCONTINUED | OUTPATIENT
Start: 2025-08-04 | End: 2025-08-04 | Stop reason: HOSPADM

## 2025-08-04 RX ORDER — TETRACAINE HYDROCHLORIDE 5 MG/ML
SOLUTION OPHTHALMIC AS NEEDED
Status: DISCONTINUED | OUTPATIENT
Start: 2025-08-04 | End: 2025-08-04 | Stop reason: HOSPADM

## 2025-08-04 RX ORDER — CYCLOPENT/TROPIC/PHEN/KETR/WAT 1%-1%-2.5%
1 DROPS (EA) OPHTHALMIC (EYE)
Status: DISPENSED | OUTPATIENT
Start: 2025-08-04 | End: 2025-08-04

## 2025-08-04 RX ADMIN — TETRACAINE HYDROCHLORIDE 1 DROP: 5 SOLUTION OPHTHALMIC at 12:02

## 2025-08-04 RX ADMIN — MIDAZOLAM HYDROCHLORIDE 2 MG: 1 INJECTION, SOLUTION INTRAMUSCULAR; INTRAVENOUS at 13:51

## 2025-08-04 RX ADMIN — Medication 1 DROP: at 12:04

## 2025-08-04 RX ADMIN — TETRACAINE HYDROCHLORIDE 1 DROP: 5 SOLUTION OPHTHALMIC at 12:03

## 2025-08-04 RX ADMIN — FENTANYL CITRATE 25 MCG: 50 INJECTION, SOLUTION INTRAMUSCULAR; INTRAVENOUS at 13:55

## 2025-08-04 RX ADMIN — FENTANYL CITRATE 25 MCG: 50 INJECTION, SOLUTION INTRAMUSCULAR; INTRAVENOUS at 13:51

## 2025-08-04 RX ADMIN — Medication 1 DROP: at 12:14

## 2025-08-04 RX ADMIN — Medication 1 DROP: at 12:09

## 2025-08-04 RX ADMIN — ACETAZOLAMIDE 500 MG: 500 CAPSULE, EXTENDED RELEASE ORAL at 14:22

## (undated) DEVICE — CONMED SCOPE SAVER BITE BLOCK, 20X27 MM: Brand: SCOPE SAVER

## (undated) DEVICE — LUBE JELLY PK/2.75GM STRL BX/144

## (undated) DEVICE — PATIENT RETURN ELECTRODE, SINGLE-USE, CONTACT QUALITY MONITORING, ADULT, WITH 9FT CORD, FOR PATIENTS WEIGING OVER 33LBS. (15KG): Brand: MEGADYNE

## (undated) DEVICE — Device

## (undated) DEVICE — BLD BEAVER MICROSHARP 15D 3MM BLU LF

## (undated) DEVICE — SYR LUERLOK 5CC

## (undated) DEVICE — CLEARCUT® HP2 SLIT KNIFE INTREPID MICRO-COAXIAL SYSTEM 2.4 DB: Brand: CLEARCUT®; INTREPID

## (undated) DEVICE — CANN IRR/INJ AIR ANT CHAMBER 6MM BEND 27G

## (undated) DEVICE — VLV SXN AIR/H2O ORCAPOD3 1P/U STRL

## (undated) DEVICE — HP CONCL INTREPID COAX I/A CRV .3MM

## (undated) DEVICE — SUCTION CANISTER, 1500CC, RIGID: Brand: DEROYAL

## (undated) DEVICE — GLV SURG SENSICARE W/ALOE PF LF 8 STRL

## (undated) DEVICE — THE MONARCH® "D" CARTRIDGE IS A SINGLE-USE POLYPROPYLENE CARTRIDGE FOR POSTERIOR CHAMBER IOL DELIVERY: Brand: MONARCH® III

## (undated) DEVICE — GLV SURG SENSICARE W/ALOE PF LF 8.5 STRL

## (undated) DEVICE — HYBRID TUBING/CAP SET FOR OLYMPUS® SCOPES: Brand: ERBE

## (undated) DEVICE — ENDOSCOPY PORT CONNECTOR FOR OLYMPUS® SCOPES: Brand: ERBE

## (undated) DEVICE — FRCP BIOP RADLJAW4 HOT 2.2X240 BX40

## (undated) DEVICE — SOL IRRIG H2O 1000ML STRL

## (undated) DEVICE — FRCP BX RADJAW4 NDL 2.8 240 STD OG

## (undated) DEVICE — Device: Brand: MALYUGIN RING SYSTEM 7.0MM